# Patient Record
Sex: FEMALE | Race: WHITE | Employment: OTHER | URBAN - METROPOLITAN AREA
[De-identification: names, ages, dates, MRNs, and addresses within clinical notes are randomized per-mention and may not be internally consistent; named-entity substitution may affect disease eponyms.]

---

## 2017-05-23 ENCOUNTER — HOSPITAL ENCOUNTER (OUTPATIENT)
Age: 82
Discharge: HOME | End: 2017-05-23
Payer: MEDICARE

## 2017-05-23 LAB
ALT SERPL-CCNC: 9 U/L (ref 5–33)
AST SERPL-CCNC: 18 U/L
CHOLESTEROL/HDL RATIO: 3.1
CHOLESTEROL: 178 MG/DL
HDLC SERPL-MCNC: 57 MG/DL
LDL CHOLESTEROL: 101 MG/DL (ref 0–130)
TRIGL SERPL-MCNC: 101 MG/DL
VLDLC SERPL CALC-MCNC: NORMAL MG/DL (ref 1–30)

## 2017-05-23 PROCEDURE — 80061 LIPID PANEL: CPT

## 2017-05-23 PROCEDURE — 36415 COLL VENOUS BLD VENIPUNCTURE: CPT

## 2017-05-23 PROCEDURE — 84460 ALANINE AMINO (ALT) (SGPT): CPT

## 2017-05-23 PROCEDURE — 84450 TRANSFERASE (AST) (SGOT): CPT

## 2017-09-21 ENCOUNTER — HOSPITAL ENCOUNTER (OUTPATIENT)
Age: 82
Discharge: HOME | End: 2017-09-21
Payer: MEDICARE

## 2017-09-21 LAB
ABSOLUTE EOS #: 0.2 K/UL (ref 0–0.4)
ABSOLUTE LYMPH #: 1.3 K/UL (ref 1–4.8)
ABSOLUTE MONO #: 0.6 K/UL (ref 0.1–1.3)
ANION GAP SERPL CALCULATED.3IONS-SCNC: 11 MMOL/L (ref 9–17)
BASOPHILS # BLD: 1 %
BASOPHILS ABSOLUTE: 0.1 K/UL (ref 0–0.2)
BUN BLDV-MCNC: 20 MG/DL (ref 8–23)
CHLORIDE BLD-SCNC: 104 MMOL/L (ref 98–107)
CO2: 27 MMOL/L (ref 20–31)
CREAT SERPL-MCNC: 0.92 MG/DL (ref 0.5–0.9)
DIFFERENTIAL TYPE: ABNORMAL
EOSINOPHILS RELATIVE PERCENT: 5 %
GFR AFRICAN AMERICAN: >60 ML/MIN
GFR NON-AFRICAN AMERICAN: 58 ML/MIN
GFR SERPL CREATININE-BSD FRML MDRD: ABNORMAL ML/MIN/{1.73_M2}
GFR SERPL CREATININE-BSD FRML MDRD: ABNORMAL ML/MIN/{1.73_M2}
GLUCOSE BLD-MCNC: 90 MG/DL (ref 70–99)
HCT VFR BLD CALC: 37.4 % (ref 36–46)
HEMOGLOBIN: 12.4 G/DL (ref 12–16)
LYMPHOCYTES # BLD: 25 %
MCH RBC QN AUTO: 31.5 PG (ref 26–34)
MCHC RBC AUTO-ENTMCNC: 33.1 G/DL (ref 31–37)
MCV RBC AUTO: 95.1 FL (ref 80–100)
MONOCYTES # BLD: 11 %
PDW BLD-RTO: 13.5 % (ref 11.5–14.9)
PLATELET # BLD: 223 K/UL (ref 150–450)
PLATELET ESTIMATE: ABNORMAL
PMV BLD AUTO: 6.6 FL (ref 6–12)
POTASSIUM SERPL-SCNC: 4.8 MMOL/L (ref 3.7–5.3)
RBC # BLD: 3.93 M/UL (ref 4–5.2)
RBC # BLD: ABNORMAL 10*6/UL
SEG NEUTROPHILS: 58 %
SEGMENTED NEUTROPHILS ABSOLUTE COUNT: 3 K/UL (ref 1.3–9.1)
SODIUM BLD-SCNC: 142 MMOL/L (ref 135–144)
THYROXINE, FREE: 1.41 NG/DL (ref 0.93–1.7)
TSH SERPL DL<=0.05 MIU/L-ACNC: 1.32 MIU/L (ref 0.3–5)
WBC # BLD: 5.2 K/UL (ref 3.5–11)
WBC # BLD: ABNORMAL 10*3/UL

## 2017-09-21 PROCEDURE — 85025 COMPLETE CBC W/AUTO DIFF WBC: CPT

## 2017-09-21 PROCEDURE — 82947 ASSAY GLUCOSE BLOOD QUANT: CPT

## 2017-09-21 PROCEDURE — 36415 COLL VENOUS BLD VENIPUNCTURE: CPT

## 2017-09-21 PROCEDURE — 84443 ASSAY THYROID STIM HORMONE: CPT

## 2017-09-21 PROCEDURE — 82565 ASSAY OF CREATININE: CPT

## 2017-09-21 PROCEDURE — 84439 ASSAY OF FREE THYROXINE: CPT

## 2017-09-21 PROCEDURE — 80051 ELECTROLYTE PANEL: CPT

## 2017-09-21 PROCEDURE — 84520 ASSAY OF UREA NITROGEN: CPT

## 2019-04-22 ENCOUNTER — HOSPITAL ENCOUNTER (OUTPATIENT)
Dept: WOMENS IMAGING | Age: 84
Discharge: HOME | End: 2019-04-24
Payer: MEDICARE

## 2019-04-22 DIAGNOSIS — N95.1 MENOPAUSAL STATE: ICD-10-CM

## 2019-04-22 PROCEDURE — 77080 DXA BONE DENSITY AXIAL: CPT

## 2019-04-25 ENCOUNTER — HOSPITAL ENCOUNTER (OUTPATIENT)
Age: 84
Discharge: HOME | End: 2019-04-25
Payer: MEDICARE

## 2019-04-25 LAB
ABSOLUTE EOS #: 0.2 K/UL (ref 0–0.4)
ABSOLUTE IMMATURE GRANULOCYTE: ABNORMAL K/UL (ref 0–0.3)
ABSOLUTE LYMPH #: 1.3 K/UL (ref 1–4.8)
ABSOLUTE MONO #: 0.5 K/UL (ref 0.1–1.3)
ALT SERPL-CCNC: 12 U/L (ref 5–33)
ANION GAP SERPL CALCULATED.3IONS-SCNC: 11 MMOL/L (ref 9–17)
BASOPHILS # BLD: 1 % (ref 0–2)
BASOPHILS ABSOLUTE: 0 K/UL (ref 0–0.2)
BUN BLDV-MCNC: 17 MG/DL (ref 8–23)
BUN/CREAT BLD: NORMAL (ref 9–20)
CALCIUM IONIZED: 1.23 MMOL/L (ref 1.13–1.33)
CALCIUM SERPL-MCNC: 8.7 MG/DL (ref 8.6–10.4)
CHLORIDE BLD-SCNC: 103 MMOL/L (ref 98–107)
CHOLESTEROL/HDL RATIO: 3.6
CHOLESTEROL: 164 MG/DL
CO2: 25 MMOL/L (ref 20–31)
CREAT SERPL-MCNC: 0.88 MG/DL (ref 0.5–0.9)
DIFFERENTIAL TYPE: ABNORMAL
EOSINOPHILS RELATIVE PERCENT: 5 % (ref 0–4)
GFR AFRICAN AMERICAN: >60 ML/MIN
GFR NON-AFRICAN AMERICAN: >60 ML/MIN
GFR SERPL CREATININE-BSD FRML MDRD: NORMAL ML/MIN/{1.73_M2}
GFR SERPL CREATININE-BSD FRML MDRD: NORMAL ML/MIN/{1.73_M2}
GLUCOSE BLD-MCNC: 88 MG/DL (ref 70–99)
HCT VFR BLD CALC: 39.4 % (ref 36–46)
HDLC SERPL-MCNC: 46 MG/DL
HEMOGLOBIN: 13 G/DL (ref 12–16)
IMMATURE GRANULOCYTES: ABNORMAL %
IRON: 79 UG/DL (ref 37–145)
LDL CHOLESTEROL: 100 MG/DL (ref 0–130)
LYMPHOCYTES # BLD: 27 % (ref 24–44)
MCH RBC QN AUTO: 31.6 PG (ref 26–34)
MCHC RBC AUTO-ENTMCNC: 33.1 G/DL (ref 31–37)
MCV RBC AUTO: 95.3 FL (ref 80–100)
MONOCYTES # BLD: 10 % (ref 1–7)
NRBC AUTOMATED: ABNORMAL PER 100 WBC
PDW BLD-RTO: 13.7 % (ref 11.5–14.9)
PLATELET # BLD: 241 K/UL (ref 150–450)
PLATELET ESTIMATE: ABNORMAL
PMV BLD AUTO: 6.6 FL (ref 6–12)
POTASSIUM SERPL-SCNC: 4.7 MMOL/L (ref 3.7–5.3)
RBC # BLD: 4.13 M/UL (ref 4–5.2)
RBC # BLD: ABNORMAL 10*6/UL
SEG NEUTROPHILS: 57 % (ref 36–66)
SEGMENTED NEUTROPHILS ABSOLUTE COUNT: 2.6 K/UL (ref 1.3–9.1)
SODIUM BLD-SCNC: 139 MMOL/L (ref 135–144)
TRIGL SERPL-MCNC: 91 MG/DL
TSH SERPL DL<=0.05 MIU/L-ACNC: 2.17 MIU/L (ref 0.3–5)
VITAMIN B-12: 1957 PG/ML (ref 232–1245)
VLDLC SERPL CALC-MCNC: NORMAL MG/DL (ref 1–30)
WBC # BLD: 4.7 K/UL (ref 3.5–11)
WBC # BLD: ABNORMAL 10*3/UL

## 2019-04-25 PROCEDURE — 80048 BASIC METABOLIC PNL TOTAL CA: CPT

## 2019-04-25 PROCEDURE — 36415 COLL VENOUS BLD VENIPUNCTURE: CPT

## 2019-04-25 PROCEDURE — 84460 ALANINE AMINO (ALT) (SGPT): CPT

## 2019-04-25 PROCEDURE — 80061 LIPID PANEL: CPT

## 2019-04-25 PROCEDURE — 83540 ASSAY OF IRON: CPT

## 2019-04-25 PROCEDURE — 82330 ASSAY OF CALCIUM: CPT

## 2019-04-25 PROCEDURE — 82607 VITAMIN B-12: CPT

## 2019-04-25 PROCEDURE — 85025 COMPLETE CBC W/AUTO DIFF WBC: CPT

## 2019-04-25 PROCEDURE — 84443 ASSAY THYROID STIM HORMONE: CPT

## 2020-04-27 ENCOUNTER — HOSPITAL ENCOUNTER (INPATIENT)
Age: 85
LOS: 1 days | Discharge: ANOTHER HOSPITAL | DRG: 690 | End: 2020-04-28
Attending: EMERGENCY MEDICINE | Admitting: FAMILY MEDICINE
Payer: MEDICARE

## 2020-04-27 ENCOUNTER — TELEPHONE (OUTPATIENT)
Dept: UROLOGY | Age: 85
End: 2020-04-27

## 2020-04-27 VITALS
WEIGHT: 176 LBS | OXYGEN SATURATION: 99 % | HEIGHT: 59 IN | SYSTOLIC BLOOD PRESSURE: 112 MMHG | HEART RATE: 88 BPM | DIASTOLIC BLOOD PRESSURE: 63 MMHG | TEMPERATURE: 98.1 F | BODY MASS INDEX: 35.48 KG/M2 | RESPIRATION RATE: 20 BRPM

## 2020-04-27 PROBLEM — R31.9 HEMATURIA: Status: ACTIVE | Noted: 2020-04-27

## 2020-04-27 PROBLEM — E03.8 OTHER SPECIFIED HYPOTHYROIDISM: Status: ACTIVE | Noted: 2020-04-27

## 2020-04-27 PROBLEM — E53.8 VITAMIN B12 DEFICIENCY: Status: ACTIVE | Noted: 2020-04-27

## 2020-04-27 PROBLEM — K21.9 GASTROESOPHAGEAL REFLUX DISEASE WITHOUT ESOPHAGITIS: Status: ACTIVE | Noted: 2020-04-27

## 2020-04-27 LAB
-: ABNORMAL
ABSOLUTE EOS #: 0.2 K/UL (ref 0–0.4)
ABSOLUTE IMMATURE GRANULOCYTE: ABNORMAL K/UL (ref 0–0.3)
ABSOLUTE LYMPH #: 1.1 K/UL (ref 1–4.8)
ABSOLUTE MONO #: 0.7 K/UL (ref 0.1–1.3)
ALBUMIN SERPL-MCNC: 4 G/DL (ref 3.5–5.2)
ALBUMIN/GLOBULIN RATIO: ABNORMAL (ref 1–2.5)
ALP BLD-CCNC: 82 U/L (ref 35–104)
ALT SERPL-CCNC: 14 U/L (ref 5–33)
AMORPHOUS: ABNORMAL
ANION GAP SERPL CALCULATED.3IONS-SCNC: 14 MMOL/L (ref 9–17)
AST SERPL-CCNC: 22 U/L
BACTERIA: ABNORMAL
BASOPHILS # BLD: 1 % (ref 0–2)
BASOPHILS ABSOLUTE: 0.1 K/UL (ref 0–0.2)
BILIRUB SERPL-MCNC: 0.22 MG/DL (ref 0.3–1.2)
BILIRUBIN URINE: NEGATIVE
BUN BLDV-MCNC: 17 MG/DL (ref 8–23)
BUN/CREAT BLD: ABNORMAL (ref 9–20)
CALCIUM SERPL-MCNC: 9.5 MG/DL (ref 8.6–10.4)
CASTS UA: ABNORMAL /LPF
CHLORIDE BLD-SCNC: 102 MMOL/L (ref 98–107)
CO2: 24 MMOL/L (ref 20–31)
COLOR: YELLOW
COMMENT UA: ABNORMAL
CREAT SERPL-MCNC: 1.03 MG/DL (ref 0.5–0.9)
CRYSTALS, UA: ABNORMAL /HPF
DIFFERENTIAL TYPE: ABNORMAL
EOSINOPHILS RELATIVE PERCENT: 2 % (ref 0–4)
EPITHELIAL CELLS UA: ABNORMAL /HPF
GFR AFRICAN AMERICAN: >60 ML/MIN
GFR NON-AFRICAN AMERICAN: 51 ML/MIN
GFR SERPL CREATININE-BSD FRML MDRD: ABNORMAL ML/MIN/{1.73_M2}
GFR SERPL CREATININE-BSD FRML MDRD: ABNORMAL ML/MIN/{1.73_M2}
GLUCOSE BLD-MCNC: 112 MG/DL (ref 70–99)
GLUCOSE URINE: NEGATIVE
HCT VFR BLD CALC: 39.9 % (ref 36–46)
HEMOGLOBIN: 12.9 G/DL (ref 12–16)
IMMATURE GRANULOCYTES: ABNORMAL %
INR BLD: 1
KETONES, URINE: NEGATIVE
LEUKOCYTE ESTERASE, URINE: ABNORMAL
LYMPHOCYTES # BLD: 15 % (ref 24–44)
MCH RBC QN AUTO: 31 PG (ref 26–34)
MCHC RBC AUTO-ENTMCNC: 32.3 G/DL (ref 31–37)
MCV RBC AUTO: 95.8 FL (ref 80–100)
MONOCYTES # BLD: 9 % (ref 1–7)
MUCUS: ABNORMAL
NITRITE, URINE: NEGATIVE
NRBC AUTOMATED: ABNORMAL PER 100 WBC
OTHER OBSERVATIONS UA: ABNORMAL
PDW BLD-RTO: 13.6 % (ref 11.5–14.9)
PH UA: 7.5 (ref 5–8)
PLATELET # BLD: 267 K/UL (ref 150–450)
PLATELET ESTIMATE: ABNORMAL
PMV BLD AUTO: 6.7 FL (ref 6–12)
POTASSIUM SERPL-SCNC: 4.7 MMOL/L (ref 3.7–5.3)
PROTEIN UA: ABNORMAL
PROTHROMBIN TIME: 13 SEC (ref 11.8–14.6)
RBC # BLD: 4.17 M/UL (ref 4–5.2)
RBC # BLD: ABNORMAL 10*6/UL
RBC UA: ABNORMAL /HPF
RENAL EPITHELIAL, UA: ABNORMAL /HPF
SEG NEUTROPHILS: 73 % (ref 36–66)
SEGMENTED NEUTROPHILS ABSOLUTE COUNT: 5.8 K/UL (ref 1.3–9.1)
SODIUM BLD-SCNC: 140 MMOL/L (ref 135–144)
SPECIFIC GRAVITY UA: 1.02 (ref 1–1.03)
TOTAL PROTEIN: 7.3 G/DL (ref 6.4–8.3)
TRICHOMONAS: ABNORMAL
TURBIDITY: CLEAR
URINE HGB: ABNORMAL
UROBILINOGEN, URINE: NORMAL
WBC # BLD: 7.8 K/UL (ref 3.5–11)
WBC # BLD: ABNORMAL 10*3/UL
WBC UA: ABNORMAL /HPF
YEAST: ABNORMAL

## 2020-04-27 PROCEDURE — 2580000003 HC RX 258: Performed by: FAMILY MEDICINE

## 2020-04-27 PROCEDURE — 99284 EMERGENCY DEPT VISIT MOD MDM: CPT

## 2020-04-27 PROCEDURE — 81001 URINALYSIS AUTO W/SCOPE: CPT

## 2020-04-27 PROCEDURE — 3E1K88Z IRRIGATION OF GENITOURINARY TRACT USING IRRIGATING SUBSTANCE, VIA NATURAL OR ARTIFICIAL OPENING ENDOSCOPIC: ICD-10-PCS | Performed by: FAMILY MEDICINE

## 2020-04-27 PROCEDURE — 51700 IRRIGATION OF BLADDER: CPT

## 2020-04-27 PROCEDURE — 1200000000 HC SEMI PRIVATE

## 2020-04-27 PROCEDURE — 85025 COMPLETE CBC W/AUTO DIFF WBC: CPT

## 2020-04-27 PROCEDURE — 2580000003 HC RX 258: Performed by: EMERGENCY MEDICINE

## 2020-04-27 PROCEDURE — 6370000000 HC RX 637 (ALT 250 FOR IP): Performed by: FAMILY MEDICINE

## 2020-04-27 PROCEDURE — 87088 URINE BACTERIA CULTURE: CPT

## 2020-04-27 PROCEDURE — 87086 URINE CULTURE/COLONY COUNT: CPT

## 2020-04-27 PROCEDURE — 51702 INSERT TEMP BLADDER CATH: CPT

## 2020-04-27 PROCEDURE — 85610 PROTHROMBIN TIME: CPT

## 2020-04-27 PROCEDURE — 6360000002 HC RX W HCPCS: Performed by: EMERGENCY MEDICINE

## 2020-04-27 PROCEDURE — 36415 COLL VENOUS BLD VENIPUNCTURE: CPT

## 2020-04-27 PROCEDURE — 80053 COMPREHEN METABOLIC PANEL: CPT

## 2020-04-27 PROCEDURE — 87186 SC STD MICRODIL/AGAR DIL: CPT

## 2020-04-27 RX ORDER — SODIUM CHLORIDE 0.9 % (FLUSH) 0.9 %
10 SYRINGE (ML) INJECTION EVERY 12 HOURS SCHEDULED
Status: DISCONTINUED | OUTPATIENT
Start: 2020-04-27 | End: 2020-04-28 | Stop reason: HOSPADM

## 2020-04-27 RX ORDER — ALBUTEROL SULFATE 90 UG/1
1 AEROSOL, METERED RESPIRATORY (INHALATION) PRN
COMMUNITY
Start: 2019-12-11 | End: 2021-01-19

## 2020-04-27 RX ORDER — LISINOPRIL 10 MG/1
10 TABLET ORAL DAILY
Status: DISCONTINUED | OUTPATIENT
Start: 2020-04-27 | End: 2020-04-28 | Stop reason: HOSPADM

## 2020-04-27 RX ORDER — MAGNESIUM SULFATE 1 G/100ML
1 INJECTION INTRAVENOUS PRN
Status: DISCONTINUED | OUTPATIENT
Start: 2020-04-27 | End: 2020-04-28 | Stop reason: HOSPADM

## 2020-04-27 RX ORDER — ACETAMINOPHEN 325 MG/1
650 TABLET ORAL EVERY 6 HOURS PRN
Status: DISCONTINUED | OUTPATIENT
Start: 2020-04-27 | End: 2020-04-28 | Stop reason: HOSPADM

## 2020-04-27 RX ORDER — PHENAZOPYRIDINE HYDROCHLORIDE 100 MG/1
100 TABLET, FILM COATED ORAL ONCE
Status: COMPLETED | OUTPATIENT
Start: 2020-04-27 | End: 2020-04-27

## 2020-04-27 RX ORDER — ACETAMINOPHEN 325 MG/1
650 TABLET ORAL EVERY 4 HOURS PRN
Status: DISCONTINUED | OUTPATIENT
Start: 2020-04-27 | End: 2020-04-28 | Stop reason: HOSPADM

## 2020-04-27 RX ORDER — LATANOPROST 50 UG/ML
1 SOLUTION/ DROPS OPHTHALMIC NIGHTLY
Status: ON HOLD | COMMUNITY
Start: 2019-05-31 | End: 2020-04-27

## 2020-04-27 RX ORDER — SODIUM CHLORIDE 9 MG/ML
INJECTION, SOLUTION INTRAVENOUS CONTINUOUS
Status: DISCONTINUED | OUTPATIENT
Start: 2020-04-27 | End: 2020-04-28 | Stop reason: HOSPADM

## 2020-04-27 RX ORDER — SODIUM CHLORIDE 0.9 % (FLUSH) 0.9 %
10 SYRINGE (ML) INJECTION PRN
Status: DISCONTINUED | OUTPATIENT
Start: 2020-04-27 | End: 2020-04-28 | Stop reason: HOSPADM

## 2020-04-27 RX ORDER — LEVOTHYROXINE SODIUM 0.07 MG/1
75 TABLET ORAL DAILY
Status: DISCONTINUED | OUTPATIENT
Start: 2020-04-27 | End: 2020-04-28 | Stop reason: HOSPADM

## 2020-04-27 RX ORDER — PRAVASTATIN SODIUM 40 MG
80 TABLET ORAL NIGHTLY
Status: DISCONTINUED | OUTPATIENT
Start: 2020-04-28 | End: 2020-04-27

## 2020-04-27 RX ORDER — POTASSIUM CHLORIDE 20 MEQ/1
40 TABLET, EXTENDED RELEASE ORAL PRN
Status: DISCONTINUED | OUTPATIENT
Start: 2020-04-27 | End: 2020-04-28 | Stop reason: HOSPADM

## 2020-04-27 RX ORDER — PROMETHAZINE HYDROCHLORIDE 25 MG/1
12.5 TABLET ORAL EVERY 6 HOURS PRN
Status: DISCONTINUED | OUTPATIENT
Start: 2020-04-27 | End: 2020-04-28 | Stop reason: HOSPADM

## 2020-04-27 RX ORDER — ONDANSETRON 2 MG/ML
4 INJECTION INTRAMUSCULAR; INTRAVENOUS EVERY 6 HOURS PRN
Status: DISCONTINUED | OUTPATIENT
Start: 2020-04-27 | End: 2020-04-28 | Stop reason: HOSPADM

## 2020-04-27 RX ORDER — PRAVASTATIN SODIUM 40 MG
40 TABLET ORAL NIGHTLY
Status: DISCONTINUED | OUTPATIENT
Start: 2020-04-27 | End: 2020-04-27

## 2020-04-27 RX ORDER — POTASSIUM CHLORIDE 7.45 MG/ML
10 INJECTION INTRAVENOUS PRN
Status: DISCONTINUED | OUTPATIENT
Start: 2020-04-27 | End: 2020-04-28 | Stop reason: HOSPADM

## 2020-04-27 RX ORDER — LATANOPROST 50 UG/ML
1 SOLUTION/ DROPS OPHTHALMIC NIGHTLY
COMMUNITY

## 2020-04-27 RX ORDER — LISINOPRIL 5 MG/1
2.5 TABLET ORAL DAILY
Status: ON HOLD | COMMUNITY
End: 2021-04-02 | Stop reason: HOSPADM

## 2020-04-27 RX ORDER — LATANOPROST 50 UG/ML
1 SOLUTION/ DROPS OPHTHALMIC NIGHTLY
Status: DISCONTINUED | OUTPATIENT
Start: 2020-04-27 | End: 2020-04-28 | Stop reason: HOSPADM

## 2020-04-27 RX ORDER — PRAVASTATIN SODIUM 40 MG
80 TABLET ORAL NIGHTLY
Status: DISCONTINUED | OUTPATIENT
Start: 2020-04-27 | End: 2020-04-28 | Stop reason: HOSPADM

## 2020-04-27 RX ORDER — AMOXICILLIN 250 MG
1 CAPSULE ORAL DAILY PRN
COMMUNITY

## 2020-04-27 RX ORDER — LEVOTHYROXINE SODIUM 0.07 MG/1
75 TABLET ORAL DAILY
COMMUNITY

## 2020-04-27 RX ORDER — AZELASTINE HYDROCHLORIDE AND FLUTICASONE PROPIONATE 137; 50 UG/1; UG/1
1 SPRAY, METERED NASAL
COMMUNITY
End: 2021-02-18

## 2020-04-27 RX ORDER — ACETAMINOPHEN 650 MG/1
650 SUPPOSITORY RECTAL EVERY 6 HOURS PRN
Status: DISCONTINUED | OUTPATIENT
Start: 2020-04-27 | End: 2020-04-28 | Stop reason: HOSPADM

## 2020-04-27 RX ADMIN — LATANOPROST 1 DROP: 50 SOLUTION OPHTHALMIC at 21:32

## 2020-04-27 RX ADMIN — CEFTRIAXONE SODIUM 1 G: 1 INJECTION, POWDER, FOR SOLUTION INTRAMUSCULAR; INTRAVENOUS at 12:29

## 2020-04-27 RX ADMIN — HYDROMORPHONE HYDROCHLORIDE 0.5 MG: 1 INJECTION, SOLUTION INTRAMUSCULAR; INTRAVENOUS; SUBCUTANEOUS at 13:02

## 2020-04-27 RX ADMIN — PHENAZOPYRIDINE HYDROCHLORIDE 100 MG: 100 TABLET ORAL at 15:56

## 2020-04-27 RX ADMIN — PRAVASTATIN SODIUM 80 MG: 40 TABLET ORAL at 22:19

## 2020-04-27 RX ADMIN — MAGNESIUM HYDROXIDE 30 ML: 400 SUSPENSION ORAL at 22:19

## 2020-04-27 RX ADMIN — ACETAMINOPHEN 650 MG: 325 TABLET, FILM COATED ORAL at 15:56

## 2020-04-27 RX ADMIN — SODIUM CHLORIDE: 9 INJECTION, SOLUTION INTRAVENOUS at 14:44

## 2020-04-27 ASSESSMENT — ENCOUNTER SYMPTOMS
VOMITING: 0
BLOOD IN STOOL: 0
TROUBLE SWALLOWING: 0
BACK PAIN: 0
SHORTNESS OF BREATH: 0
CHEST TIGHTNESS: 0
EYE ITCHING: 0
NAUSEA: 0
ABDOMINAL PAIN: 0
COUGH: 0
COLOR CHANGE: 0
EYE REDNESS: 0

## 2020-04-27 ASSESSMENT — PAIN SCALES - GENERAL
PAINLEVEL_OUTOF10: 2
PAINLEVEL_OUTOF10: 8
PAINLEVEL_OUTOF10: 2
PAINLEVEL_OUTOF10: 10

## 2020-04-27 ASSESSMENT — PAIN DESCRIPTION - PAIN TYPE: TYPE: ACUTE PAIN

## 2020-04-27 ASSESSMENT — PAIN DESCRIPTION - DESCRIPTORS
DESCRIPTORS: BURNING
DESCRIPTORS: BURNING

## 2020-04-27 ASSESSMENT — PAIN DESCRIPTION - LOCATION: LOCATION: GROIN;PERINEUM

## 2020-04-27 NOTE — ED NOTES
Report given to Olvin Bailey RN from [employer]. Report method by phone   The following was reviewed with receiving RN:   Current vital signs:  BP (!) 143/73   Pulse 95   Temp 97.5 °F (36.4 °C) (Oral)   Resp 18   Ht 4' 11\" (1.499 m)   Wt 176 lb (79.8 kg)   SpO2 95%   BMI 35.55 kg/m²                MEWS Score: 1     RN told that pt is having blood and clots in urine since yesterday. RN told that pt has a 3 way jung with continuous bladder irrigation going. RN told that pt is having pain and was given meds. RN told that pt was given antibiotics. Any medication or safety alerts were reviewed. Any pending diagnostics and notifications were also reviewed, as well as any safety concerns or issues, abnormal labs, abnormal imaging, and abnormal assessment findings. Questions were answered.             Vin Barrios RN  04/27/20 6660

## 2020-04-27 NOTE — PROGRESS NOTES
Physical Therapy  DATE: 2020    NAME: Krunal Dodson  MRN: [id number]   : 1935    Patient not seen this date for Physical Therapy due to:  [] Blood transfusion in progress  [] Hemodialysis  []  Patient Declined  [] Spine Precautions   [] Strict Bedrest  [] Surgery/ Procedure  [] Testing      [x] Other 2020 at 5522- HOLD PT evaluation today per nurse Britton Felton.  as pt has 3 way catheter for continuous bladder irrigation. Will check status tomorrow. [] PT being discontinued at this time. Patient independent. No further needs. [] PT being discontinued at this time as the patient has been transferred to palliative care. No further needs.     Debra Noble, PT

## 2020-04-27 NOTE — PROGRESS NOTES
Medication History completed:    New medications: latanoprost ophthalmic solution    Medications discontinued: famotidine, ferrous sulfate, metronidazole, vesicare, vitamin B12    Changes to dosing:   Lisinopril changed to 10 mg daily    Stated allergies: As listed    Other pertinent information: Medications confirmed with [facility] DR AMBERLY LAIRD.      Thank you,  Julia Rodriguez, PharmD, BCPS  [phone]

## 2020-04-27 NOTE — ED NOTES
Hope Catheter manually irrigated with nancy syringe and Saline. Pt tolerated well. Large amount of clots removed. Catheter with cherry colored urine.      Joellen Akhtar RN  04/27/20 6590

## 2020-04-27 NOTE — H&P
DO   levothyroxine (SYNTHROID) 75 MCG tablet Take 1 tablet by mouth Daily 7/31/15  Yes Jere DORON Soto, DO        Allergies:       Bactrim [sulfamethoxazole-trimethoprim]    Social History:     Tobacco:    reports that she has never smoked. She does not have any smokeless tobacco history on file. Alcohol:      reports no history of alcohol use. Drug Use:  reports no history of drug use. Family History:     Family History   Problem Relation Age of Onset    Diabetes Brother     Stroke Father     Coronary Art Dis Unknown         siblings X3       Review of Systems:     Positive and Negative as described in HPI   all 10 systems are reviewed and negative except as Noted      Review of Systems   Constitutional: Negative for chills and fatigue. HENT: Negative for drooling, mouth sores, sneezing and trouble swallowing. Eyes: Negative for redness and itching. Respiratory: Negative for cough, chest tightness and shortness of breath. Cardiovascular: Negative for chest pain, palpitations and leg swelling. Gastrointestinal: Negative for abdominal pain, blood in stool, nausea and vomiting. Endocrine: Negative for heat intolerance and polyphagia. Genitourinary: Positive for dysuria and hematuria. Negative for difficulty urinating, flank pain, pelvic pain and urgency. Musculoskeletal: Negative for arthralgias, joint swelling and neck stiffness. Skin: Negative for color change and pallor. Allergic/Immunologic: Negative for food allergies. Neurological: Negative for dizziness, seizures and headaches. Hematological: Does not bruise/bleed easily. Psychiatric/Behavioral: Negative for agitation, behavioral problems and suicidal ideas. The patient is not hyperactive.         Code Status:  Full Code    Physical Exam:     Vitals:  BP (!) 143/73   Pulse 95   Temp 97.5 °F (36.4 °C) (Oral)   Resp 18   Ht 4' 11\" (1.499 m)   Wt 176 lb (79.8 kg)   SpO2 95%   BMI 35.55 kg/m²   Temp (24hrs), Av.5 °F (36.4 Immature Granulocytes NOT REPORTED 0 %    Segs Absolute 5.80 1.3 - 9.1 k/uL    Absolute Lymph # 1.10 1.0 - 4.8 k/uL    Absolute Mono # 0.70 0.1 - 1.3 k/uL    Absolute Eos # 0.20 0.0 - 0.4 k/uL    Basophils Absolute 0.10 0.0 - 0.2 k/uL    Absolute Immature Granulocyte NOT REPORTED 0.00 - 0.30 k/uL    WBC Morphology NOT REPORTED     RBC Morphology NOT REPORTED     Platelet Estimate NOT REPORTED    Protime-INR    Collection Time: 04/27/20  9:46 AM   Result Value Ref Range    Protime 13.0 11.8 - 14.6 sec    INR 1.0    Comprehensive Metabolic Panel    Collection Time: 04/27/20  9:46 AM   Result Value Ref Range    Glucose 112 (H) 70 - 99 mg/dL    BUN 17 8 - 23 mg/dL    CREATININE 1.03 (H) 0.50 - 0.90 mg/dL    Bun/Cre Ratio NOT REPORTED 9 - 20    Calcium 9.5 8.6 - 10.4 mg/dL    Sodium 140 135 - 144 mmol/L    Potassium 4.7 3.7 - 5.3 mmol/L    Chloride 102 98 - 107 mmol/L    CO2 24 20 - 31 mmol/L    Anion Gap 14 9 - 17 mmol/L    Alkaline Phosphatase 82 35 - 104 U/L    ALT 14 5 - 33 U/L    AST 22 <32 U/L    Total Bilirubin 0.22 (L) 0.3 - 1.2 mg/dL    Total Protein 7.3 6.4 - 8.3 g/dL    Alb 4.0 3.5 - 5.2 g/dL    Albumin/Globulin Ratio NOT REPORTED 1.0 - 2.5    GFR Non- 51 (L) >60 mL/min    GFR African American >60 >60 mL/min    GFR Comment          GFR Staging NOT REPORTED    Urinalysis Reflex to Culture    Collection Time: 04/27/20  9:50 AM   Result Value Ref Range    Color, UA YELLOW YELLOW    Turbidity UA CLEAR CLEAR    Glucose, Ur NEGATIVE NEGATIVE    Bilirubin Urine NEGATIVE NEGATIVE    Ketones, Urine NEGATIVE NEGATIVE    Specific Gravity, UA 1.019 1.000 - 1.030    Urine Hgb LARGE (A) NEGATIVE    pH, UA 7.5 5.0 - 8.0    Protein, UA 4+ (A) NEGATIVE    Urobilinogen, Urine Normal Normal    Nitrite, Urine NEGATIVE NEGATIVE    Leukocyte Esterase, Urine SMALL (A) NEGATIVE    Urinalysis Comments NOT REPORTED    Microscopic Urinalysis    Collection Time: 04/27/20  9:50 AM   Result Value Ref Range    -

## 2020-04-27 NOTE — ED PROVIDER NOTES
tablet 650 mg    HYDROmorphone (DILAUDID) injection 0.5 mg    lisinopril (PRINIVIL;ZESTRIL) tablet 10 mg    levothyroxine (SYNTHROID) tablet 75 mcg    latanoprost (XALATAN) 0.005 % ophthalmic solution 1 drop    pravastatin (PRAVACHOL) tablet 40 mg    sodium chloride flush 0.9 % injection 10 mL    sodium chloride flush 0.9 % injection 10 mL    OR Linked Order Group     potassium chloride (KLOR-CON M) extended release tablet 40 mEq     potassium bicarb-citric acid (EFFER-K) effervescent tablet 40 mEq     potassium chloride 10 mEq/100 mL IVPB (Peripheral Line)    magnesium sulfate 1 g in dextrose 5% 100 mL IVPB    OR Linked Order Group     acetaminophen (TYLENOL) tablet 650 mg     acetaminophen (TYLENOL) suppository 650 mg    magnesium hydroxide (MILK OF MAGNESIA) 400 MG/5ML suspension 30 mL    OR Linked Order Group     promethazine (PHENERGAN) tablet 12.5 mg     ondansetron (ZOFRAN) injection 4 mg    0.9 % sodium chloride infusion    cefTRIAXone (ROCEPHIN) 1 g IVPB in 50 mL D5W minibag    phenazopyridine (PYRIDIUM) tablet 100 mg     -------------------------  CRITICAL CARE:   CONSULTS: IP CONSULT TO UROLOGY  IP CONSULT TO PRIMARY CARE PROVIDER  PROCEDURES: Procedures     FINAL IMPRESSION      1. Gross hematuria    2.  Acute cystitis with hematuria          DISPOSITION/PLAN   DISPOSITION Admitted 04/27/2020 12:15:31 PM      PATIENT REFERRED TO:  [address] [address]            DISCHARGE MEDICATIONS:  Current Discharge Medication List            Katharina Urbina MD  Attending Emergency Physician                      Katharina Urbina MD  04/27/20 7463

## 2020-04-27 NOTE — ED NOTES
Mode of arrival (squad #, walk in, police, etc) : walk in        Chief complaint(s): dysuria/hematuria        Arrival Note (brief scenario, treatment PTA, etc). : Pt reports Hx of UTI. Pt states onset of symptoms yesterday. Pt reports burning sensation with urination. Pt states she saw \"blood in bowl\" yesterday. Pt AOx4. Rreasy, unlabored. Pt denies fever,any further symptoms        C= \"Have you ever felt that you should Cut down on your drinking? \"  No  A= \"Have people Annoyed you by criticizing your drinking? \"  No  G= \"Have you ever felt bad or Guilty about your drinking? \"  No  E= \"Have you ever had a drink as an Eye-opener first thing in the morning to steady your nerves or to help a hangover? \"  No      Deferred []      Reason for deferring: N/A    *If yes to two or more: probable alcohol abuse. Rodrigo Rodriguez RN  04/27/20 8442

## 2020-04-28 ENCOUNTER — HOSPITAL ENCOUNTER (INPATIENT)
Age: 85
LOS: 8 days | Discharge: NURSING, REHAB OR LONG-TERM CARE | DRG: 871 | End: 2020-05-06
Attending: UROLOGY | Admitting: INTERNAL MEDICINE
Payer: MEDICARE

## 2020-04-28 PROBLEM — N30.01 ACUTE CYSTITIS WITH HEMATURIA: Status: ACTIVE | Noted: 2020-04-28

## 2020-04-28 LAB
ABSOLUTE EOS #: 0 K/UL (ref 0–0.4)
ABSOLUTE IMMATURE GRANULOCYTE: 0 K/UL (ref 0–0.3)
ABSOLUTE LYMPH #: 0.63 K/UL (ref 1–4.8)
ABSOLUTE MONO #: 0.88 K/UL (ref 0.1–0.8)
ANION GAP SERPL CALCULATED.3IONS-SCNC: 15 MMOL/L (ref 9–17)
BASOPHILS # BLD: 0 % (ref 0–2)
BASOPHILS ABSOLUTE: 0 K/UL (ref 0–0.2)
BUN BLDV-MCNC: 21 MG/DL (ref 8–23)
BUN/CREAT BLD: ABNORMAL (ref 9–20)
CALCIUM SERPL-MCNC: 7.8 MG/DL (ref 8.6–10.4)
CHLORIDE BLD-SCNC: 104 MMOL/L (ref 98–107)
CO2: 17 MMOL/L (ref 20–31)
CREAT SERPL-MCNC: 1.6 MG/DL (ref 0.5–0.9)
DIFFERENTIAL TYPE: ABNORMAL
EOSINOPHILS RELATIVE PERCENT: 0 % (ref 1–4)
GFR AFRICAN AMERICAN: 37 ML/MIN
GFR NON-AFRICAN AMERICAN: 31 ML/MIN
GFR SERPL CREATININE-BSD FRML MDRD: ABNORMAL ML/MIN/{1.73_M2}
GFR SERPL CREATININE-BSD FRML MDRD: ABNORMAL ML/MIN/{1.73_M2}
GLUCOSE BLD-MCNC: 154 MG/DL (ref 70–99)
HCT VFR BLD CALC: 32.1 % (ref 36.3–47.1)
HCT VFR BLD CALC: 34.2 % (ref 36.3–47.1)
HEMOGLOBIN: 10 G/DL (ref 11.9–15.1)
HEMOGLOBIN: 10.1 G/DL (ref 11.9–15.1)
IMMATURE GRANULOCYTES: 0 %
LYMPHOCYTES # BLD: 5 % (ref 24–44)
MCH RBC QN AUTO: 31.1 PG (ref 25.2–33.5)
MCHC RBC AUTO-ENTMCNC: 31.2 G/DL (ref 28.4–34.8)
MCV RBC AUTO: 99.7 FL (ref 82.6–102.9)
MONOCYTES # BLD: 7 % (ref 1–7)
MORPHOLOGY: NORMAL
NRBC AUTOMATED: 0 PER 100 WBC
PDW BLD-RTO: 13.5 % (ref 11.8–14.4)
PLATELET # BLD: 227 K/UL (ref 138–453)
PLATELET ESTIMATE: ABNORMAL
PMV BLD AUTO: 8.8 FL (ref 8.1–13.5)
POTASSIUM SERPL-SCNC: 4.5 MMOL/L (ref 3.7–5.3)
RBC # BLD: 3.22 M/UL (ref 3.95–5.11)
RBC # BLD: ABNORMAL 10*6/UL
SEG NEUTROPHILS: 88 % (ref 36–66)
SEGMENTED NEUTROPHILS ABSOLUTE COUNT: 11.09 K/UL (ref 1.8–7.7)
SODIUM BLD-SCNC: 136 MMOL/L (ref 135–144)
WBC # BLD: 12.6 K/UL (ref 3.5–11.3)
WBC # BLD: ABNORMAL 10*3/UL

## 2020-04-28 PROCEDURE — 85014 HEMATOCRIT: CPT

## 2020-04-28 PROCEDURE — 6370000000 HC RX 637 (ALT 250 FOR IP): Performed by: STUDENT IN AN ORGANIZED HEALTH CARE EDUCATION/TRAINING PROGRAM

## 2020-04-28 PROCEDURE — 6370000000 HC RX 637 (ALT 250 FOR IP): Performed by: FAMILY MEDICINE

## 2020-04-28 PROCEDURE — 85025 COMPLETE CBC W/AUTO DIFF WBC: CPT

## 2020-04-28 PROCEDURE — 51700 IRRIGATION OF BLADDER: CPT

## 2020-04-28 PROCEDURE — 36415 COLL VENOUS BLD VENIPUNCTURE: CPT

## 2020-04-28 PROCEDURE — 80048 BASIC METABOLIC PNL TOTAL CA: CPT

## 2020-04-28 PROCEDURE — 6360000002 HC RX W HCPCS: Performed by: STUDENT IN AN ORGANIZED HEALTH CARE EDUCATION/TRAINING PROGRAM

## 2020-04-28 PROCEDURE — 51702 INSERT TEMP BLADDER CATH: CPT

## 2020-04-28 PROCEDURE — 3E1K78Z IRRIGATION OF GENITOURINARY TRACT USING IRRIGATING SUBSTANCE, VIA NATURAL OR ARTIFICIAL OPENING: ICD-10-PCS | Performed by: UROLOGY

## 2020-04-28 PROCEDURE — 2580000003 HC RX 258: Performed by: PHYSICIAN ASSISTANT

## 2020-04-28 PROCEDURE — 1200000000 HC SEMI PRIVATE

## 2020-04-28 PROCEDURE — 85018 HEMOGLOBIN: CPT

## 2020-04-28 PROCEDURE — 2580000003 HC RX 258: Performed by: STUDENT IN AN ORGANIZED HEALTH CARE EDUCATION/TRAINING PROGRAM

## 2020-04-28 RX ORDER — SODIUM CHLORIDE 9 MG/ML
INJECTION, SOLUTION INTRAVENOUS CONTINUOUS
Status: DISCONTINUED | OUTPATIENT
Start: 2020-04-28 | End: 2020-04-28

## 2020-04-28 RX ORDER — ATROPA BELLADONNA AND OPIUM 16.2; 6 MG/1; MG/1
60 SUPPOSITORY RECTAL ONCE
Status: COMPLETED | OUTPATIENT
Start: 2020-04-28 | End: 2020-04-28

## 2020-04-28 RX ORDER — LEVOTHYROXINE SODIUM 0.07 MG/1
75 TABLET ORAL DAILY
Status: DISCONTINUED | OUTPATIENT
Start: 2020-04-28 | End: 2020-05-06 | Stop reason: HOSPADM

## 2020-04-28 RX ORDER — SODIUM CHLORIDE, SODIUM LACTATE, POTASSIUM CHLORIDE, CALCIUM CHLORIDE 600; 310; 30; 20 MG/100ML; MG/100ML; MG/100ML; MG/100ML
INJECTION, SOLUTION INTRAVENOUS CONTINUOUS
Status: DISCONTINUED | OUTPATIENT
Start: 2020-04-28 | End: 2020-04-29

## 2020-04-28 RX ORDER — ACETAMINOPHEN 325 MG/1
650 TABLET ORAL EVERY 6 HOURS PRN
Status: DISCONTINUED | OUTPATIENT
Start: 2020-04-28 | End: 2020-05-06 | Stop reason: HOSPADM

## 2020-04-28 RX ORDER — MAGNESIUM HYDROXIDE 1200 MG/15ML
3000 LIQUID ORAL CONTINUOUS
Status: DISCONTINUED | OUTPATIENT
Start: 2020-04-28 | End: 2020-05-06 | Stop reason: HOSPADM

## 2020-04-28 RX ORDER — MORPHINE SULFATE 2 MG/ML
2 INJECTION, SOLUTION INTRAMUSCULAR; INTRAVENOUS
Status: DISCONTINUED | OUTPATIENT
Start: 2020-04-28 | End: 2020-05-02

## 2020-04-28 RX ORDER — ONDANSETRON 2 MG/ML
4 INJECTION INTRAMUSCULAR; INTRAVENOUS EVERY 6 HOURS PRN
Status: DISCONTINUED | OUTPATIENT
Start: 2020-04-28 | End: 2020-05-06 | Stop reason: HOSPADM

## 2020-04-28 RX ORDER — POLYETHYLENE GLYCOL 3350 17 G/17G
17 POWDER, FOR SOLUTION ORAL DAILY
Status: DISCONTINUED | OUTPATIENT
Start: 2020-04-28 | End: 2020-05-06 | Stop reason: HOSPADM

## 2020-04-28 RX ORDER — MORPHINE SULFATE 2 MG/ML
2 INJECTION, SOLUTION INTRAMUSCULAR; INTRAVENOUS ONCE
Status: DISCONTINUED | OUTPATIENT
Start: 2020-04-28 | End: 2020-05-02

## 2020-04-28 RX ORDER — HYDROCODONE BITARTRATE AND ACETAMINOPHEN 5; 325 MG/1; MG/1
1 TABLET ORAL EVERY 6 HOURS PRN
Status: DISCONTINUED | OUTPATIENT
Start: 2020-04-28 | End: 2020-05-02

## 2020-04-28 RX ORDER — PRAVASTATIN SODIUM 20 MG
80 TABLET ORAL NIGHTLY
Status: DISCONTINUED | OUTPATIENT
Start: 2020-04-28 | End: 2020-05-06 | Stop reason: HOSPADM

## 2020-04-28 RX ORDER — ACETAMINOPHEN 650 MG/1
650 SUPPOSITORY RECTAL EVERY 6 HOURS PRN
Status: DISCONTINUED | OUTPATIENT
Start: 2020-04-28 | End: 2020-05-06 | Stop reason: HOSPADM

## 2020-04-28 RX ORDER — LATANOPROST 50 UG/ML
1 SOLUTION/ DROPS OPHTHALMIC NIGHTLY
Status: DISCONTINUED | OUTPATIENT
Start: 2020-04-28 | End: 2020-05-06 | Stop reason: HOSPADM

## 2020-04-28 RX ORDER — TRAMADOL HYDROCHLORIDE 50 MG/1
50 TABLET ORAL EVERY 4 HOURS PRN
Status: DISCONTINUED | OUTPATIENT
Start: 2020-04-28 | End: 2020-04-28 | Stop reason: HOSPADM

## 2020-04-28 RX ORDER — HYDROCODONE BITARTRATE AND ACETAMINOPHEN 5; 325 MG/1; MG/1
2 TABLET ORAL EVERY 6 HOURS PRN
Status: DISCONTINUED | OUTPATIENT
Start: 2020-04-28 | End: 2020-05-02

## 2020-04-28 RX ORDER — ATROPA BELLADONNA AND OPIUM 16.2; 6 MG/1; MG/1
60 SUPPOSITORY RECTAL EVERY 8 HOURS PRN
Status: DISCONTINUED | OUTPATIENT
Start: 2020-04-28 | End: 2020-04-28 | Stop reason: HOSPADM

## 2020-04-28 RX ORDER — ATROPA BELLADONNA AND OPIUM 16.2; 3 MG/1; MG/1
30 SUPPOSITORY RECTAL EVERY 8 HOURS PRN
Status: DISCONTINUED | OUTPATIENT
Start: 2020-04-28 | End: 2020-05-06 | Stop reason: HOSPADM

## 2020-04-28 RX ORDER — SODIUM CHLORIDE 0.9 % (FLUSH) 0.9 %
10 SYRINGE (ML) INJECTION EVERY 12 HOURS SCHEDULED
Status: DISCONTINUED | OUTPATIENT
Start: 2020-04-28 | End: 2020-05-06 | Stop reason: HOSPADM

## 2020-04-28 RX ORDER — SODIUM CHLORIDE 0.9 % (FLUSH) 0.9 %
10 SYRINGE (ML) INJECTION PRN
Status: DISCONTINUED | OUTPATIENT
Start: 2020-04-28 | End: 2020-05-06 | Stop reason: HOSPADM

## 2020-04-28 RX ADMIN — ATROPA BELLADONNA AND OPIUM 60 MG: 16.2; 6 SUPPOSITORY RECTAL at 01:03

## 2020-04-28 RX ADMIN — SODIUM CHLORIDE, POTASSIUM CHLORIDE, SODIUM LACTATE AND CALCIUM CHLORIDE: 600; 310; 30; 20 INJECTION, SOLUTION INTRAVENOUS at 12:20

## 2020-04-28 RX ADMIN — LEVOTHYROXINE SODIUM 75 MCG: 75 TABLET ORAL at 11:07

## 2020-04-28 RX ADMIN — SODIUM CHLORIDE: 9 INJECTION, SOLUTION INTRAVENOUS at 08:11

## 2020-04-28 RX ADMIN — ACETAMINOPHEN 650 MG: 325 TABLET, FILM COATED ORAL at 00:31

## 2020-04-28 RX ADMIN — SODIUM CHLORIDE 3000 ML: 900 IRRIGANT IRRIGATION at 11:06

## 2020-04-28 RX ADMIN — ATROPA BELLADONNA AND OPIUM 60 MG: 16.2; 6 SUPPOSITORY RECTAL at 07:23

## 2020-04-28 RX ADMIN — TRAMADOL HYDROCHLORIDE 50 MG: 50 TABLET ORAL at 01:02

## 2020-04-28 RX ADMIN — TRAMADOL HYDROCHLORIDE 50 MG: 50 TABLET ORAL at 05:04

## 2020-04-28 RX ADMIN — MORPHINE SULFATE 2 MG: 2 INJECTION, SOLUTION INTRAMUSCULAR; INTRAVENOUS at 08:11

## 2020-04-28 RX ADMIN — CEFTRIAXONE SODIUM 1 G: 1 INJECTION, POWDER, FOR SOLUTION INTRAMUSCULAR; INTRAVENOUS at 11:07

## 2020-04-28 ASSESSMENT — PAIN SCALES - GENERAL
PAINLEVEL_OUTOF10: 9
PAINLEVEL_OUTOF10: 10
PAINLEVEL_OUTOF10: 3
PAINLEVEL_OUTOF10: 10
PAINLEVEL_OUTOF10: 8
PAINLEVEL_OUTOF10: 8
PAINLEVEL_OUTOF10: 3

## 2020-04-28 NOTE — H&P
Cricket Vann, [address] [facility], [place], [facility] [place]   Urology Consultation      Patient:  Reta Lewis  MRN: [id number]  YOB: 1935    CHIEF COMPLAINT:  Hematuria, UTI    HISTORY OF PRESENT ILLNESS:   The patient is a 80 y.o. female who presents with hematuria and UTI, transfer from [facility] overnight and admitted to Dr Mattie Galvan. She has recurrent UTIs, last saw Dr Camila Avila with [facility] June of 2019. Follows for recurrent UTIs and incontinence, some being ESBL ecoli. Had bladder suspension in the 1970s after having 3 children. She had a bladder scope in 2016 with Dr Sarah Templeton that showed only signs of chronic cystitis, bladder biopsy negative for malignancy and urine cytology also negative. She is on aspirin and Plavix for CAD and one stent placed in 2015. She began having some tingling and burning with urinating over the past week, and says Sunday night into Monday morning she had blood in her urine 2-3 times overnight and by morning was straight blood. She has never seen blood in her urine before. She presented to [facility] where they were unable to get her urine clear and she was transferred to [facility]. She c/o 8-10/10 pain in her belly and like she needs to urinate. She denies fever, chills, cough, nausea or vomiting. She last took her aspirin and plavix yesterday morning. She feels tired and weak. She came from home where she lives with her . She was never a smoker and no chemical exposures she can think of. Her creatinine yesterday morning was 1.03, hemoglobin 12.9 and WBC 7.8, morning labs here pending. Her UA consistent with UTI and urine culture growing ecoli with no sensitivities back yet. She is on Rocephin. No upper tract imaging yet. She had prior CTAP without contrast in 10/2016 that shows bladder wall thickening and irregular but kidneys no masses or stones.      Voiding at baseline: Incontinence, UTIs ESBL ecoli in past, had bladder sling  History of UTIs:

## 2020-04-28 NOTE — PROGRESS NOTES
[address] transported [address] to   [facility] all belongings with patient at discharge.

## 2020-04-28 NOTE — PROGRESS NOTES
Called and spoke with Raul Nelson RN at [facility] gave report. He said he was going to see if there is an open place in the urology unit for the patient, then call back.

## 2020-04-28 NOTE — PLAN OF CARE
Problem: Falls - Risk of:  Goal: Will remain free from falls  Description: Will remain free from falls  4/28/2020 0341 by Ede Marte RN  Outcome: Ongoing     Problem: Falls - Risk of:  Goal: Absence of physical injury  Description: Absence of physical injury  4/28/2020 0341 by Ede Marte RN  Outcome: Ongoing     Problem: SAFETY  Goal: Free from accidental physical injury  4/28/2020 0341 by Ede Marte RN  Outcome: Ongoing     Problem: SAFETY  Goal: Free from intentional harm  4/28/2020 0341 by Ede Marte RN  Outcome: Ongoing     Problem: PAIN  Goal: Patient's pain/discomfort is manageable  4/28/2020 0341 by Ede Marte RN  Outcome: Ongoing     Problem: SKIN INTEGRITY  Goal: Skin integrity is maintained or improved  4/28/2020 0341 by Ede Marte RN  Outcome: Ongoing     Problem: Pain:  Goal: Pain level will decrease  Description: Pain level will decrease  4/28/2020 0341 by Ede Marte RN  Outcome: Ongoing     Problem: Pain:  Goal: Control of acute pain  Description: Control of acute pain  4/28/2020 0341 by Ede Marte RN  Outcome: Ongoing     Problem: Pain:  Goal: Control of chronic pain  Description: Control of chronic pain  4/28/2020 0341 by Ede Marte RN  Outcome: Ongoing

## 2020-04-28 NOTE — PROGRESS NOTES
Spoke with Paul Macdonald , explained to him patient has been having pain 10/10, how we have been having repeated clotted off fullys, how we attempted to fix problem by replacing cathter as well as increasing the [place], we also discussed the amount of hand irrigation both by this RN as well as clin lead and house supervisor that has been attempted, also discussed concerns about leaking at the fully site. , also informed that we had given a B&O suppository earlier in shift and leakage continues, also informed of firmness of abdomen as well as the color and clots in the urine that we have been able to get out, after discussing all of this information Paul Macdonald wants patient transferred to [facility] in order for the Urology residents to handle. Placing order now.

## 2020-04-28 NOTE — DISCHARGE SUMMARY
Discharge Summary      Patient ID: Deepti Porras    MRN: [id number]     Acct:  [de-identified]       Patient's PCP: Ronda Groves Date: 4/27/2020     Discharge Date: 4/28/2020      Admitting Physician: Gilles Soto MD    Discharge Physician: Jennifer Lowry MD     Discharge Diagnoses:    Primary Problem  Hematuria    Principal Problem:    Hematuria  Active Problems:    Hyperlipidemia    HTN (hypertension)    ASCVD (arteriosclerotic cardiovascular disease)    Other specified hypothyroidism  Acute cystitis with hematuria  Resolved Problems:    * No resolved hospital problems. *    Past Medical History:   Diagnosis Date    Coronary artery disease     ESBL (extended spectrum beta-lactamase) producing bacteria infection 6/30/2015    E. Coli urine    HH (hiatus hernia)     Hyperlipidemia     Hypertension     Thyroid disease          Code Status:  Full Code    Hospital Course:   H&P Reviewed. patient  With a past medical history of hypertension ASCVDpresented with gross hematuria and UTI. IV Rocephin was started. Urology services were consulted. Bladder irrigation with 3 way Hope catheter was initiated. Patient's symptoms improved with treatment. Urology Services transfer the patient to [facility] for further medical  Management.  Patient being transferred in stable condition      Consults:  Catarino Martinez TO PRIMARY CARE PROVIDER    Significant Diagnostic Studies: as above, and as follows:    Treatments: as above    Disposition:  [facility]    Discharged Condition: Stable        Discharge Medications:    Sheryle Naas   Home Medication Instructions VKI:[id number]    Printed on:04/28/20 1050   Medication Information                      albuterol sulfate HFA (VENTOLIN HFA) 108 (90 Base) MCG/ACT inhaler  1 spray by Nasal route as needed             Azelastine-Fluticasone (DYMISTA) 137-50 MCG/ACT SUSP  1 Squirt             latanoprost (XALATAN) 0.005 % ophthalmic solution  Place 1 drop into both eyes nightly             levothyroxine (SYNTHROID) 75 MCG tablet  Take 75 mcg by mouth daily             lisinopril (PRINIVIL;ZESTRIL) 10 MG tablet  Take 10 mg by mouth daily             pravastatin (PRAVACHOL) 40 MG tablet  Take 1 tablet by mouth nightly             senna-docusate (PERICOLACE) 8.6-50 MG per tablet  Take 1 tablet by mouth daily as needed constipation                          Time Spent on discharge is more than  35 min in the examination, evaluation, counseling and review of medications and discharge plan.       Electronically signed by Cristopher Cespedes MD     Copy sent to Dr. Durham Render

## 2020-04-29 ENCOUNTER — APPOINTMENT (OUTPATIENT)
Dept: NUCLEAR MEDICINE | Age: 85
DRG: 871 | End: 2020-04-29
Attending: UROLOGY
Payer: MEDICARE

## 2020-04-29 ENCOUNTER — APPOINTMENT (OUTPATIENT)
Dept: CT IMAGING | Age: 85
DRG: 871 | End: 2020-04-29
Attending: UROLOGY
Payer: MEDICARE

## 2020-04-29 ENCOUNTER — APPOINTMENT (OUTPATIENT)
Dept: GENERAL RADIOLOGY | Age: 85
DRG: 871 | End: 2020-04-29
Attending: UROLOGY
Payer: MEDICARE

## 2020-04-29 PROBLEM — E87.5 HYPERKALEMIA: Status: ACTIVE | Noted: 2020-04-29

## 2020-04-29 PROBLEM — E53.8 COBALAMIN DEFICIENCY: Status: ACTIVE | Noted: 2020-04-27

## 2020-04-29 PROBLEM — D72.829 LEUCOCYTOSIS: Status: ACTIVE | Noted: 2020-04-29

## 2020-04-29 PROBLEM — N17.9 AKI (ACUTE KIDNEY INJURY) (HCC): Status: ACTIVE | Noted: 2020-04-29

## 2020-04-29 PROBLEM — R65.20 SEVERE SEPSIS (HCC): Status: ACTIVE | Noted: 2020-04-29

## 2020-04-29 PROBLEM — D64.9 ANEMIA: Status: ACTIVE | Noted: 2020-04-29

## 2020-04-29 PROBLEM — Z16.12 UTI DUE TO EXTENDED-SPECTRUM BETA LACTAMASE (ESBL) PRODUCING ESCHERICHIA COLI: Status: ACTIVE | Noted: 2020-04-29

## 2020-04-29 PROBLEM — N39.0 UTI DUE TO EXTENDED-SPECTRUM BETA LACTAMASE (ESBL) PRODUCING ESCHERICHIA COLI: Status: ACTIVE | Noted: 2020-04-29

## 2020-04-29 PROBLEM — B96.29 UTI DUE TO EXTENDED-SPECTRUM BETA LACTAMASE (ESBL) PRODUCING ESCHERICHIA COLI: Status: ACTIVE | Noted: 2020-04-29

## 2020-04-29 PROBLEM — E03.8 OTHER SPECIFIED HYPOTHYROIDISM: Status: RESOLVED | Noted: 2020-04-27 | Resolved: 2020-04-29

## 2020-04-29 PROBLEM — I95.9 HYPOTENSION: Status: ACTIVE | Noted: 2020-04-29

## 2020-04-29 PROBLEM — A41.9 SEVERE SEPSIS (HCC): Status: ACTIVE | Noted: 2020-04-29

## 2020-04-29 LAB
-: ABNORMAL
AMORPHOUS: ABNORMAL
ANION GAP SERPL CALCULATED.3IONS-SCNC: 14 MMOL/L (ref 9–17)
BACTERIA: ABNORMAL
BILIRUBIN URINE: ABNORMAL
BNP INTERPRETATION: ABNORMAL
BNP INTERPRETATION: ABNORMAL
BUN BLDV-MCNC: 33 MG/DL (ref 8–23)
BUN/CREAT BLD: ABNORMAL (ref 9–20)
C-REACTIVE PROTEIN: 331.5 MG/L (ref 0–5)
C-REACTIVE PROTEIN: 361 MG/L (ref 0–5)
CALCIUM SERPL-MCNC: 8.4 MG/DL (ref 8.6–10.4)
CASTS UA: ABNORMAL /LPF (ref 0–8)
CHLORIDE BLD-SCNC: 103 MMOL/L (ref 98–107)
CHLORIDE, UR: 88 MMOL/L
CO2: 20 MMOL/L (ref 20–31)
COLOR: ABNORMAL
COMPLEMENT C3: 106 MG/DL (ref 90–180)
COMPLEMENT C3: 109 MG/DL (ref 90–180)
COMPLEMENT C4: 39 MG/DL (ref 10–40)
COMPLEMENT C4: 40 MG/DL (ref 10–40)
CORTISOL COLLECTION INFO: ABNORMAL
CORTISOL: 22.3 UG/DL (ref 2.7–18.4)
CREAT SERPL-MCNC: 1.66 MG/DL (ref 0.5–0.9)
CREATININE URINE: 126 MG/DL (ref 28–217)
CREATININE URINE: 61.2 MG/DL (ref 28–217)
CRYSTALS, UA: ABNORMAL /HPF
CULTURE: ABNORMAL
EKG ATRIAL RATE: 94 BPM
EKG P AXIS: 28 DEGREES
EKG P-R INTERVAL: 144 MS
EKG Q-T INTERVAL: 334 MS
EKG QRS DURATION: 66 MS
EKG QTC CALCULATION (BAZETT): 417 MS
EKG R AXIS: 62 DEGREES
EKG T AXIS: 27 DEGREES
EKG VENTRICULAR RATE: 94 BPM
EOSINOPHIL,URINE: NORMAL
EPITHELIAL CELLS UA: ABNORMAL /HPF (ref 0–5)
FERRITIN: 141 UG/L (ref 13–150)
FREE KAPPA/LAMBDA RATIO: 2.1 (ref 0.26–1.65)
GFR AFRICAN AMERICAN: 36 ML/MIN
GFR NON-AFRICAN AMERICAN: 29 ML/MIN
GFR SERPL CREATININE-BSD FRML MDRD: ABNORMAL ML/MIN/{1.73_M2}
GFR SERPL CREATININE-BSD FRML MDRD: ABNORMAL ML/MIN/{1.73_M2}
GLUCOSE BLD-MCNC: 112 MG/DL (ref 70–99)
GLUCOSE BLD-MCNC: 87 MG/DL (ref 65–105)
GLUCOSE URINE: ABNORMAL
HAV IGM SER IA-ACNC: NONREACTIVE
HCT VFR BLD CALC: 27.8 % (ref 36.3–47.1)
HEMOGLOBIN: 8.8 G/DL (ref 11.9–15.1)
HEPATITIS B CORE IGM ANTIBODY: NONREACTIVE
HEPATITIS B SURFACE ANTIGEN: NONREACTIVE
HEPATITIS C ANTIBODY: NONREACTIVE
KAPPA FREE LIGHT CHAINS QNT: 2.44 MG/DL (ref 0.37–1.94)
KETONES, URINE: NEGATIVE
LACTIC ACID, SEPSIS WHOLE BLOOD: 1.4 MMOL/L (ref 0.5–1.9)
LACTIC ACID, SEPSIS WHOLE BLOOD: 5 MMOL/L (ref 0.5–1.9)
LACTIC ACID, SEPSIS: ABNORMAL MMOL/L (ref 0.5–1.9)
LACTIC ACID, SEPSIS: NORMAL MMOL/L (ref 0.5–1.9)
LAMBDA FREE LIGHT CHAINS QNT: 1.16 MG/DL (ref 0.57–2.63)
LEUKOCYTE ESTERASE, URINE: ABNORMAL
Lab: ABNORMAL
MCH RBC QN AUTO: 32.1 PG (ref 25.2–33.5)
MCHC RBC AUTO-ENTMCNC: 31.7 G/DL (ref 28.4–34.8)
MCV RBC AUTO: 101.5 FL (ref 82.6–102.9)
MUCUS: ABNORMAL
NITRITE, URINE: POSITIVE
NRBC AUTOMATED: 0 PER 100 WBC
OTHER OBSERVATIONS UA: ABNORMAL
PDW BLD-RTO: 14.1 % (ref 11.8–14.4)
PH UA: 5 (ref 5–8)
PLATELET # BLD: 245 K/UL (ref 138–453)
PMV BLD AUTO: 9.6 FL (ref 8.1–13.5)
POTASSIUM SERPL-SCNC: 5.3 MMOL/L (ref 3.7–5.3)
POTASSIUM SERPL-SCNC: 5.7 MMOL/L (ref 3.7–5.3)
POTASSIUM, UR: 34.9 MMOL/L
PRO-BNP: 1678 PG/ML
PRO-BNP: 2057 PG/ML
PROTEIN UA: ABNORMAL
RBC # BLD: 2.74 M/UL (ref 3.95–5.11)
RBC UA: ABNORMAL /HPF (ref 0–4)
RENAL EPITHELIAL, UA: ABNORMAL /HPF
SARS-COV-2, PCR: NORMAL
SARS-COV-2, RAPID: NORMAL
SARS-COV-2: NOT DETECTED
SEDIMENTATION RATE, ERYTHROCYTE: 91 MM (ref 0–20)
SODIUM BLD-SCNC: 137 MMOL/L (ref 135–144)
SODIUM,UR: 100 MMOL/L
SOURCE: NORMAL
SPECIFIC GRAVITY UA: 1.01 (ref 1–1.03)
SPECIMEN DESCRIPTION: ABNORMAL
TOTAL PROTEIN, URINE: 72 MG/DL
TRICHOMONAS: ABNORMAL
TSH SERPL DL<=0.05 MIU/L-ACNC: 1.93 MIU/L (ref 0.3–5)
TURBIDITY: ABNORMAL
URINE HGB: ABNORMAL
UROBILINOGEN, URINE: NORMAL
WBC # BLD: 17.8 K/UL (ref 3.5–11.3)
WBC UA: ABNORMAL /HPF (ref 0–5)
YEAST: ABNORMAL

## 2020-04-29 PROCEDURE — 78580 LUNG PERFUSION IMAGING: CPT

## 2020-04-29 PROCEDURE — 84155 ASSAY OF PROTEIN SERUM: CPT

## 2020-04-29 PROCEDURE — 6360000002 HC RX W HCPCS: Performed by: STUDENT IN AN ORGANIZED HEALTH CARE EDUCATION/TRAINING PROGRAM

## 2020-04-29 PROCEDURE — 93010 ELECTROCARDIOGRAM REPORT: CPT | Performed by: INTERNAL MEDICINE

## 2020-04-29 PROCEDURE — 2700000000 HC OXYGEN THERAPY PER DAY

## 2020-04-29 PROCEDURE — 82570 ASSAY OF URINE CREATININE: CPT

## 2020-04-29 PROCEDURE — 2060000000 HC ICU INTERMEDIATE R&B

## 2020-04-29 PROCEDURE — 83605 ASSAY OF LACTIC ACID: CPT

## 2020-04-29 PROCEDURE — 2500000003 HC RX 250 WO HCPCS: Performed by: INTERNAL MEDICINE

## 2020-04-29 PROCEDURE — 83880 ASSAY OF NATRIURETIC PEPTIDE: CPT

## 2020-04-29 PROCEDURE — 81001 URINALYSIS AUTO W/SCOPE: CPT

## 2020-04-29 PROCEDURE — 86038 ANTINUCLEAR ANTIBODIES: CPT

## 2020-04-29 PROCEDURE — 83883 ASSAY NEPHELOMETRY NOT SPEC: CPT

## 2020-04-29 PROCEDURE — 86160 COMPLEMENT ANTIGEN: CPT

## 2020-04-29 PROCEDURE — 87040 BLOOD CULTURE FOR BACTERIA: CPT

## 2020-04-29 PROCEDURE — 86334 IMMUNOFIX E-PHORESIS SERUM: CPT

## 2020-04-29 PROCEDURE — 85027 COMPLETE CBC AUTOMATED: CPT

## 2020-04-29 PROCEDURE — 2580000003 HC RX 258: Performed by: STUDENT IN AN ORGANIZED HEALTH CARE EDUCATION/TRAINING PROGRAM

## 2020-04-29 PROCEDURE — 99222 1ST HOSP IP/OBS MODERATE 55: CPT | Performed by: INTERNAL MEDICINE

## 2020-04-29 PROCEDURE — 84300 ASSAY OF URINE SODIUM: CPT

## 2020-04-29 PROCEDURE — 84165 PROTEIN E-PHORESIS SERUM: CPT

## 2020-04-29 PROCEDURE — 36415 COLL VENOUS BLD VENIPUNCTURE: CPT

## 2020-04-29 PROCEDURE — 86140 C-REACTIVE PROTEIN: CPT

## 2020-04-29 PROCEDURE — 84443 ASSAY THYROID STIM HORMONE: CPT

## 2020-04-29 PROCEDURE — U0002 COVID-19 LAB TEST NON-CDC: HCPCS

## 2020-04-29 PROCEDURE — 80048 BASIC METABOLIC PNL TOTAL CA: CPT

## 2020-04-29 PROCEDURE — 82947 ASSAY GLUCOSE BLOOD QUANT: CPT

## 2020-04-29 PROCEDURE — 85651 RBC SED RATE NONAUTOMATED: CPT

## 2020-04-29 PROCEDURE — 94640 AIRWAY INHALATION TREATMENT: CPT

## 2020-04-29 PROCEDURE — 83036 HEMOGLOBIN GLYCOSYLATED A1C: CPT

## 2020-04-29 PROCEDURE — 74176 CT ABD & PELVIS W/O CONTRAST: CPT

## 2020-04-29 PROCEDURE — 87205 SMEAR GRAM STAIN: CPT

## 2020-04-29 PROCEDURE — 82436 ASSAY OF URINE CHLORIDE: CPT

## 2020-04-29 PROCEDURE — 3430000000 HC RX DIAGNOSTIC RADIOPHARMACEUTICAL: Performed by: STUDENT IN AN ORGANIZED HEALTH CARE EDUCATION/TRAINING PROGRAM

## 2020-04-29 PROCEDURE — 99223 1ST HOSP IP/OBS HIGH 75: CPT | Performed by: INTERNAL MEDICINE

## 2020-04-29 PROCEDURE — 84156 ASSAY OF PROTEIN URINE: CPT

## 2020-04-29 PROCEDURE — 80074 ACUTE HEPATITIS PANEL: CPT

## 2020-04-29 PROCEDURE — 71045 X-RAY EXAM CHEST 1 VIEW: CPT

## 2020-04-29 PROCEDURE — 93970 EXTREMITY STUDY: CPT

## 2020-04-29 PROCEDURE — 93005 ELECTROCARDIOGRAM TRACING: CPT | Performed by: STUDENT IN AN ORGANIZED HEALTH CARE EDUCATION/TRAINING PROGRAM

## 2020-04-29 PROCEDURE — 2580000003 HC RX 258: Performed by: INTERNAL MEDICINE

## 2020-04-29 PROCEDURE — 82728 ASSAY OF FERRITIN: CPT

## 2020-04-29 PROCEDURE — 82533 TOTAL CORTISOL: CPT

## 2020-04-29 PROCEDURE — 84132 ASSAY OF SERUM POTASSIUM: CPT

## 2020-04-29 PROCEDURE — 84133 ASSAY OF URINE POTASSIUM: CPT

## 2020-04-29 PROCEDURE — 6370000000 HC RX 637 (ALT 250 FOR IP): Performed by: STUDENT IN AN ORGANIZED HEALTH CARE EDUCATION/TRAINING PROGRAM

## 2020-04-29 PROCEDURE — A9540 TC99M MAA: HCPCS | Performed by: STUDENT IN AN ORGANIZED HEALTH CARE EDUCATION/TRAINING PROGRAM

## 2020-04-29 RX ORDER — NICOTINE POLACRILEX 4 MG
15 LOZENGE BUCCAL PRN
Status: DISCONTINUED | OUTPATIENT
Start: 2020-04-29 | End: 2020-05-06 | Stop reason: HOSPADM

## 2020-04-29 RX ORDER — DEXTROSE MONOHYDRATE 25 G/50ML
12.5 INJECTION, SOLUTION INTRAVENOUS PRN
Status: DISCONTINUED | OUTPATIENT
Start: 2020-04-29 | End: 2020-05-06 | Stop reason: HOSPADM

## 2020-04-29 RX ORDER — SODIUM CHLORIDE 9 MG/ML
INJECTION, SOLUTION INTRAVENOUS CONTINUOUS
Status: DISCONTINUED | OUTPATIENT
Start: 2020-04-29 | End: 2020-04-29

## 2020-04-29 RX ORDER — DEXTROSE MONOHYDRATE 25 G/50ML
25 INJECTION, SOLUTION INTRAVENOUS ONCE
Status: COMPLETED | OUTPATIENT
Start: 2020-04-29 | End: 2020-04-29

## 2020-04-29 RX ORDER — 0.9 % SODIUM CHLORIDE 0.9 %
500 INTRAVENOUS SOLUTION INTRAVENOUS ONCE
Status: COMPLETED | OUTPATIENT
Start: 2020-04-29 | End: 2020-04-29

## 2020-04-29 RX ORDER — LEVALBUTEROL INHALATION SOLUTION 0.63 MG/3ML
0.63 SOLUTION RESPIRATORY (INHALATION) EVERY 8 HOURS PRN
Status: DISCONTINUED | OUTPATIENT
Start: 2020-04-29 | End: 2020-04-29

## 2020-04-29 RX ORDER — ALBUTEROL SULFATE 90 UG/1
1 AEROSOL, METERED RESPIRATORY (INHALATION) EVERY 4 HOURS PRN
Status: DISCONTINUED | OUTPATIENT
Start: 2020-04-29 | End: 2020-05-06

## 2020-04-29 RX ORDER — BUDESONIDE AND FORMOTEROL FUMARATE DIHYDRATE 80; 4.5 UG/1; UG/1
2 AEROSOL RESPIRATORY (INHALATION) 2 TIMES DAILY
Status: DISCONTINUED | OUTPATIENT
Start: 2020-04-29 | End: 2020-05-06 | Stop reason: HOSPADM

## 2020-04-29 RX ORDER — ALBUTEROL SULFATE 2.5 MG/3ML
2.5 SOLUTION RESPIRATORY (INHALATION) EVERY 6 HOURS PRN
Status: DISCONTINUED | OUTPATIENT
Start: 2020-04-29 | End: 2020-04-29

## 2020-04-29 RX ORDER — DEXTROSE MONOHYDRATE 50 MG/ML
100 INJECTION, SOLUTION INTRAVENOUS PRN
Status: DISCONTINUED | OUTPATIENT
Start: 2020-04-29 | End: 2020-05-06 | Stop reason: HOSPADM

## 2020-04-29 RX ADMIN — ACETAMINOPHEN 650 MG: 325 TABLET ORAL at 21:10

## 2020-04-29 RX ADMIN — PRAVASTATIN SODIUM 80 MG: 20 TABLET ORAL at 21:09

## 2020-04-29 RX ADMIN — SODIUM CHLORIDE 500 ML: 9 INJECTION, SOLUTION INTRAVENOUS at 09:50

## 2020-04-29 RX ADMIN — ALBUTEROL SULFATE 2.5 MG: 2.5 SOLUTION RESPIRATORY (INHALATION) at 09:09

## 2020-04-29 RX ADMIN — ALBUTEROL SULFATE 2.5 MG: 2.5 SOLUTION RESPIRATORY (INHALATION) at 06:12

## 2020-04-29 RX ADMIN — SODIUM CHLORIDE, PRESERVATIVE FREE 10 ML: 5 INJECTION INTRAVENOUS at 21:08

## 2020-04-29 RX ADMIN — INSULIN HUMAN 10 UNITS: 100 INJECTION, SOLUTION PARENTERAL at 09:09

## 2020-04-29 RX ADMIN — SODIUM CHLORIDE: 9 INJECTION, SOLUTION INTRAVENOUS at 11:57

## 2020-04-29 RX ADMIN — SODIUM CHLORIDE 500 ML: 9 INJECTION, SOLUTION INTRAVENOUS at 11:53

## 2020-04-29 RX ADMIN — MEROPENEM 1 G: 1 INJECTION, POWDER, FOR SOLUTION INTRAVENOUS at 11:52

## 2020-04-29 RX ADMIN — ONDANSETRON 4 MG: 2 INJECTION INTRAMUSCULAR; INTRAVENOUS at 05:36

## 2020-04-29 RX ADMIN — SODIUM BICARBONATE: 84 INJECTION, SOLUTION INTRAVENOUS at 20:04

## 2020-04-29 RX ADMIN — LEVOTHYROXINE SODIUM 75 MCG: 75 TABLET ORAL at 11:52

## 2020-04-29 RX ADMIN — Medication 2.9 MILLICURIE: at 14:40

## 2020-04-29 RX ADMIN — DEXTROSE MONOHYDRATE 25 G: 25 INJECTION, SOLUTION INTRAVENOUS at 09:09

## 2020-04-29 RX ADMIN — ONDANSETRON 4 MG: 2 INJECTION INTRAMUSCULAR; INTRAVENOUS at 20:11

## 2020-04-29 ASSESSMENT — PAIN SCALES - GENERAL
PAINLEVEL_OUTOF10: 8
PAINLEVEL_OUTOF10: 0

## 2020-04-29 NOTE — FLOWSHEET NOTE
Writer claudia [facility] Internal Med Resident regarding patients potassium of 5.3.   Mejia Courser, RN

## 2020-04-29 NOTE — PLAN OF CARE
BRONCHOSPASM/BRONCHOCONSTRICTION     [x]         IMPROVE AERATION/BREATH SOUNDS  [x]   ADMINISTER BRONCHODILATOR THERAPY AS APPROPRIATE  [x]   ASSESS BREATH SOUNDS  []   IMPLEMENT AEROSOL/MDI PROTOCOL  [x]   PATIENT EDUCATION AS NEEDED     PROVIDE ADEQUATE OXYGENATION WITH ACCEPTABLE SP02/ABG'S    [x]  IDENTIFY APPROPRIATE OXYGEN THERAPY  [x]   MONITOR SP02/ABG'S AS NEEDED   [x]   PATIENT EDUCATION AS NEEDED

## 2020-04-29 NOTE — PROGRESS NOTES
Dwayne Pearl PRISCILLAatient Assessment complete. Hematuria [R31.9] . Vitals:    04/29/20 0515   BP: 100/62   Pulse: 98   Resp:    Temp:    SpO2: 100%   . Patients home meds are   Prior to Admission medications    Medication Sig Start Date End Date Taking?  Authorizing Provider   lisinopril (PRINIVIL;ZESTRIL) 10 MG tablet Take 10 mg by mouth daily    Historical Provider, MD   latanoprost (XALATAN) 0.005 % ophthalmic solution Place 1 drop into both eyes nightly    Historical Provider, MD   senna-docusate (Andrés Door) 8.6-50 MG per tablet Take 1 tablet by mouth daily as needed constipation    Historical Provider, MD   albuterol sulfate HFA (VENTOLIN HFA) 108 (90 Base) MCG/ACT inhaler 1 spray by Nasal route as needed 12/11/19 12/10/20  Historical Provider, MD   Azelastine-Fluticasone (DYMISTA) 137-50 MCG/ACT SUSP 1 Squirt    Historical Provider, MD   levothyroxine (SYNTHROID) 75 MCG tablet Take 75 mcg by mouth daily    Historical Provider, MD   pravastatin (PRAVACHOL) 40 MG tablet Take 1 tablet by mouth nightly  Patient taking differently: Take 80 mg by mouth nightly Changed in Nov 2019 7/31/15   Cherrie Crabtree DO   .      RR 18  Breath Sounds: exp wheeze       · Bronchodilator assessment at level  3  ·   ·   ·   · []    Bronchodilator Assessment  BRONCHODILATOR ASSESSMENT SCORE  Score 0 1 2 3 4 5   Breath Sounds   []  Patient Baseline []  No Wheeze good aeration []  Faint, scattered wheezing, good aeration [x]  Expiratory Wheezing and or moderately diminished []  Insp/Exp wheeze and/or very diminished []  Insp/Exp and/ or marked distress   Respiratory Rate   []  Patient Baseline []  Less than 20 [x]  Less than 20 []  20-25 []  Greater than 25 []  Greater than 25   Peak flow % of Pred or PB []  NA   []  Greater than 90%  []  81-90% []  71-80% []  Less than or equal to 70%  or unable to perform []  Unable due to Respiratory Distress   Dyspnea re []  Patient Baseline []  No SOB []  No SOB [x]  SOB on exertion []  SOB

## 2020-04-29 NOTE — CONSULTS
swelling or deformities  Hematologic: No bleeding or bruising. Neurologic: No headache, weakness, numbness, or tingling. Integument: No rash, no ulcers. Psychiatric: No depression. Endocrine: No polyuria, no polydipsia, no polyphagia. Physical Examination :     Patient Vitals for the past 8 hrs:   BP Temp Temp src Pulse Resp SpO2   04/29/20 1200 (!) 89/44 -- -- 96 -- --   04/29/20 1045 (!) 92/48 -- -- 97 -- --   04/29/20 0930 (!) 81/46 -- -- 105 -- --   04/29/20 0745 (!) 89/50 98.7 °F (37.1 °C) Temporal 95 18 100 %   04/29/20 0515 100/62 -- -- 98 -- 100 %   04/29/20 0415 (!) 97/56 -- -- 100 20 --     General Appearance: Awake, alert, and in no apparent distress. Heavy set  Head:  Normocephalic, no trauma. On nasal 02  Eyes: Pupils equal, round, reactive to light and accommodation; extraocular movements intact; sclera anicteric; conjunctivae pink. No embolic phenomena. ENT: Oropharynx clear, without erythema, exudate, or thrush. No tenderness of sinuses. Mouth/throat: mucosa pink and moist. No lesions. Neck:Supple, without lymphadenopathy. Thyroid normal, No bruits. Pulmonary/Chest: Distant sounds. Clear to auscultation, without wheezes, rales, or rhonchi. No dullness to percussion. Cardiovascular: Regular rate and rhythm without murmurs, rubs, or gallops. Abdomen: Soft, non tender. Bowel sounds normal. No organomegaly  All four Extremities: No cyanosis, clubbing, edema, or effusions. Neurologic: No gross sensory or motor deficits. Skin: Warm and dry with good turgor. Signs of peripheral arterial insufficiency. No ulcerations. No open wounds.     Medical Decision Making -Laboratory:   I have independently reviewed/ordered the following labs:    CBC with Differential:   Recent Labs     04/27/20  0946 04/28/20  0937 04/28/20  1810 04/29/20  0624   WBC 7.8 12.6*  --  17.8*   HGB 12.9 10.0* 10.1* 8.8*   HCT 39.9 32.1* 34.2* 27.8*    227  --  245   LYMPHOPCT 15* 5*  --   --    MONOPCT 9* 7  --   -- to   superimposed acute bronchitis. Medical Decision [place]:     4/29/2020  1:11 AM - Yaima Johnson Incoming Lab Results From [facility]     Specimen Information: Urine, clean catch        Component Collected Lab   Specimen Description 04/27/2020  9:50 AM - [address] [facility]   . CLEAN CATCH URINE    Special Requests 04/27/2020  9:50 AM [facility]- [address]  Lab   NOT REPORTED    Culture Abnormal  04/27/2020  9:50 AM [address] >[id number] CFU/ML THIS ORGANISM IS AN EXTENDED-SPECTRUM BETA-LACTAMASE  AND RESISTANCE TO THERAPY WITH PENICILLINS, CEPHALOSPORINS AND AZTREONAM IS EXPECTED.  THESE ORGANISMS GENERALLY REMAIN SUSCEPTIBLE TO CARBAPENEMS.  CONSIDER ID CONSULTATION. Testing Performed By     [facility] Louie Martinez Name Director Address Valid Date Range   [address]  [address]  [address] 08/30/17 0801-Present   [facility] [address] [address] Connor Ayala MD [address] [facility]. [facility] [address] 11/17/17 1521-Present   Susceptibility     Escherichia coli (1)     Antibiotic Interpretation CORRINA Status    amikacin   Final     NOT REPORTED   ampicillin Resistant  Final     >=32  RESISTANT   ampicillin-sulbactam   Final     NOT REPORTED   aztreonam Resistant  Final     <=1  RESISTANT   ceFAZolin Resistant  Final     >=64  RESISTANT   ceFAZolin Resistant Cefazolin sensitivity results can be used to predict the effectiveness of oral cephalosporins (eg.  Cephalexin) in uncomplicated Urinary Tract Infections due to E. coli, K. pneumoniae, and P. mirabilis Final    cefepime Resistant  Final     <=1  RESISTANT   cefTRIAXone Resistant  Final     >=64  RESISTANT   ciprofloxacin Resistant  Final     >=4  RESISTANT   ertapenem   Final     NOT REPORTED   Confirmatory Extended Spectrum Beta-Lactamase Positive POSITIVE Final    gentamicin Sensitive  Final

## 2020-04-29 NOTE — H&P
[address]     Department of Internal Medicine - Staff Internal Medicine Teaching Service          CONSULT  NOTE / HISTORY AND PHYSICAL EXAMINATION   Date: 4/29/2020  Patient Name: Ruperto Ibarra  Date of admission: 4/28/2020  5:27 AM  YOB: 1935  PCP: Jac Mcarthur  History Obtained From:  patient, electronic medical record    Consult Reason:     Consult Reason: JOSIAH and hyperkalemia    HISTORY OF PRESENTING ILLNESS     The patient is a pleasant 80 y.o. female presents with a chief complaint of hematuria and dysuria and was found to have hematuria and UTI. Patient was admitted under Urology service and underwent bladder irrigation. Due to JOSIAH and hyperkalemia, patient's care was transferred to medicine service. Last Echo  2015 Left ventricle is normal in size. Global left ventricular systolic function  is normal. Estimated ejection fraction is 55-60% . Mild left ventricular hypertrophy. Mild aortic insufficiency. Mild mitral regurgitation. Mild-moderate tricuspid regurgitation. Mild pulmonary hypertension. Last Cath  In 2013    Last lipid profile  4/25/19- lipid panel within normal limits    Last TSH 4/25/19- 2.7    Last HbA1C pending    On my evaluation,  Afebrile  Hypotensive  Heart rate in 90s  On 3 lpm of oxygen via nasal cannula and saturating well. Potassium 5. BUN 33  Creat 1.66  Lactic acid 5.0    ESR 91  Pro BNP 1678  Cortisol 22.3  WBC 17.8  Hemoglobin 8.8  Urine culture- ESBL E.Coli  C3 109  C4 39  UA- red color, turbid, positive for nitrites and leucocyte esterase  Urine protein 72  Urine creatinine 61.2  Urine sodium 100    CT Abdomen   1. No ureteral calculi. 2. High attenuation material in the bladder which may be related to blood   products.  Cystoscopy may be of benefit for further evaluation. 3. 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    4. Colonic diverticulosis. 5. Atherosclerotic disease. 6. Small amount of free pelvic fluid of uncertain etiology. 7. Bilateral inguinal hernias containing fat. CXR-   The patient's stomach overlies the left lower 2/3 of the chest, which has   increased from the previous known hiatal hernia seen in 2013.  This could be   related to worsening of the patient's hiatal hernia, though a component of an   elevated left hemidiaphragm is in the differential.  This would be best   assessed with a CT scan of the chest if clinically concerned.       Cardiomegaly.       COPD, with some bronchial thickening noted which may be related to   superimposed acute bronchitis. NM lung- pending    Review of Systems:  General ROS: Completed and except as mentioned above were negative   HEENT ROS: Completed and except as mentioned above were negative   Allergy and Immunology ROS:  Completed and except as mentioned above were negative  Hematological and Lymphatic ROS:  Completed and except as mentioned above were negative  Respiratory ROS:  Completed and except as mentioned above were negative  Cardiovascular ROS:  Completed and except as mentioned above were negative  Gastrointestinal ROS: Completed and except as mentioned above were negative  Genito-Urinary ROS:  Completed and except as mentioned above were negative  Musculoskeletal ROS:  Completed and except as mentioned above were negative  Neurological ROS:  Completed and except as mentioned above were negative  Skin & Dermatological ROS:  Completed and except as mentioned above were negative  Psychological ROS:  Completed and except as mentioned above were negative    PAST MEDICAL HISTORY     Past Medical History:   Diagnosis Date    Coronary artery disease     ESBL (extended spectrum beta-lactamase) producing bacteria infection 6/30/2015    E.  Coli urine    HH (hiatus hernia)     Hyperlipidemia     Hypertension     Thyroid disease        PAST SURGICAL HISTORY     Past

## 2020-04-29 NOTE — PROGRESS NOTES
[facility] [place]  Urology Progress Note    Subjective: No acute events overnight. Denies f/c/n/v/cp/sob. Mild pain from catheter. CBI very fast drip running clear, no clots. Patient Vitals for the past 24 hrs:   BP Temp Temp src Pulse Resp SpO2   04/29/20 0515 100/62 -- -- 98 -- 100 %   04/29/20 0415 (!) 97/56 -- -- 100 20 --   04/29/20 0400 (!) 85/48 98.3 °F (36.8 °C) Oral 93 18 98 %   04/29/20 0030 (!) 92/56 97.5 °F (36.4 °C) Oral 97 20 94 %   04/28/20 1928 (!) 91/44 97.6 °F (36.4 °C) Oral 98 18 96 %   04/28/20 1815 (!) 99/43 -- -- -- -- --   04/28/20 1536 93/60 98.3 °F (36.8 °C) Oral 90 20 99 %   04/28/20 1122 101/65 98.9 °F (37.2 °C) Oral 88 20 96 %       Intake/Output Summary (Last 24 hours) at 4/29/2020 0718  Last data filed at 4/29/2020 0650  Gross per 24 hour   Intake 1044 ml   Output 2400 ml   Net -1356 ml       Recent Labs     04/27/20  0946 04/28/20  0937 04/28/20  1810 04/29/20  0624   WBC 7.8 12.6*  --  17.8*   HGB 12.9 10.0* 10.1* 8.8*   HCT 39.9 32.1* 34.2* 27.8*   MCV 95.8 99.7  --  101.5    227  --  245     Recent Labs     04/27/20  0946 04/28/20  0937 04/29/20  0624    136 137   K 4.7 4.5 5.7*    104 103   CO2 24 17* 20   BUN 17 21 33*   CREATININE 1.03* 1.60* 1.66*       Recent Labs     04/27/20  0950   COLORU YELLOW   PHUR 7.5   WBCUA 10 TO 20   RBCUA TOO NUMEROUS TO COUNT   MUCUS NOT REPORTED   TRICHOMONAS NOT REPORTED   YEAST NOT REPORTED   BACTERIA MANY*   SPECGRAV 1.019   LEUKOCYTESUR SMALL*   UROBILINOGEN Normal   BILIRUBINUR NEGATIVE       Additional Lab/culture results:    Physical Exam:   NAD, A/Ox3  RRR, palpable radial pulses  Equal chest rise, normal inspiratory effort  Soft, NT/ND. No peritoneal signs. No flank pain.   No bladder distension/tenderness noted  3 way jung catheter in, CBI on wide open, crystal clear output no clots  Warm extremities, no calf tenderness      Interval Imaging Findings:    Impression:

## 2020-04-29 NOTE — CONSULTS
cannula to maintain saturation 97 to 98%. Because of hypoxia and concern for pulmonary embolism because of recent travel pulmonary service was consulted. Patient was also placed on airborne precaution and COVID testing was ordered by medicine service because of recent travel. She is not a smoker no history of COPD asthma no history of oxygen use, she denies shortness of breath cough wheezing chest pain chest tightness. Past Medical History:        Diagnosis Date    Coronary artery disease     ESBL (extended spectrum beta-lactamase) producing bacteria infection 6/30/2015    E. Coli urine    HH (hiatus hernia)     Hyperlipidemia     Hypertension     Thyroid disease        Past Surgical History:        Procedure Laterality Date    ANGIOPLASTY      BLADDER SUSPENSION      CATARACT REMOVAL WITH IMPLANT Bilateral     TOTAL KNEE ARTHROPLASTY Right        Allergies: Allergies   Allergen Reactions    Bactrim [Sulfamethoxazole-Trimethoprim] Hives    Sulfamethoxazole-Trimethoprim          Home Meds:   Prior to Admission medications    Medication Sig Start Date End Date Taking?  Authorizing Provider   lisinopril (PRINIVIL;ZESTRIL) 10 MG tablet Take 10 mg by mouth daily    Historical Provider, MD   latanoprost (XALATAN) 0.005 % ophthalmic solution Place 1 drop into both eyes nightly    Historical Provider, MD   senna-docusate (Kalpesh Coral) 8.6-50 MG per tablet Take 1 tablet by mouth daily as needed constipation    Historical Provider, MD   albuterol sulfate HFA (VENTOLIN HFA) 108 (90 Base) MCG/ACT inhaler 1 spray by Nasal route as needed 12/11/19 12/10/20  Historical Provider, MD   Azelastine-Fluticasone (DYMISTA) 137-50 MCG/ACT SUSP 1 Squirt    Historical Provider, MD   levothyroxine (SYNTHROID) 75 MCG tablet Take 75 mcg by mouth daily    Historical Provider, MD   pravastatin (PRAVACHOL) 40 MG tablet Take 1 tablet by mouth nightly  Patient taking differently: Take 80 mg by mouth nightly Changed in Nov 2019 7/31/15   Jossie Austin DO       Social History:   TOBACCO:   reports that she has never smoked. She has never used smokeless tobacco.  ETOH:   reports no history of alcohol use. OCCUPATION:      Family History:       Problem Relation Age of Onset    Diabetes Brother     Stroke Father     Coronary Art Dis Other         siblings X3       Immunizations: There is no immunization history on file for this patient.     REVIEW OF SYSTEMS:  CONSTITUTIONAL:  positive for  fatigue and decreased appetite and negative for fever and shaking chills  EYES:  negative for  double vision, blurred vision, eye discharge, visual disturbance, irritation and redness  HEENT:  negative for  nasal congestion, sore mouth, sore throat, hoarseness and voice change  RESPIRATORY:  negative for  dry cough, cough with sputum, dyspnea, wheezing, hemoptysis, chest pain and pleuritic pain  CARDIOVASCULAR:  negative for  chest pain, dyspnea, palpitations, orthopnea, PND, early saiety, edema, syncope  GASTROINTESTINAL:  positive for nausea and abdominal pain  negative for vomiting, diarrhea, constipation, pruritus, abdominal distention, jaundice, dysphagia, hematemesis and hemtochezia  GENITOURINARY:  positive for frequency, dysuria and hematuria  HEMATOLOGIC/LYMPHATIC:  negative for easy bruising, bleeding, lymphadenopathy and petechiae  ALLERGIC/IMMUNOLOGIC:  negative for recurrent infections, urticaria, hay fever and angioedema  ENDOCRINE:  negative for heat intolerance, cold intolerance, tremor and weight changes  MUSCULOSKELETAL:  negative for  myalgias, arthralgias and joint swelling  NEUROLOGICAL:  negative for headaches, dizziness, seizures, memory problems, speech problems, visual disturbance, dysphagia, weakness, numbness and tingling  BEHAVIOR/PSYCH:  negative        Physical Exam:    Vitals: BP (!) 92/48   Pulse 97   Temp 98.7 °F (37.1 °C) (Temporal)   Resp 18   SpO2 100%     Physical Examination:   General appearance - oriented 04/28/20  0937 04/28/20  1810 04/29/20  0624   WBC 7.8 12.6*  --  17.8*   HGB 12.9 10.0* 10.1* 8.8*    227  --  245     BMP:    Recent Labs     04/27/20  0946 04/28/20  0937 04/29/20  0624    136 137   K 4.7 4.5 5.7*    104 103   CO2 24 17* 20   BUN 17 21 33*   CREATININE 1.03* 1.60* 1.66*   GLUCOSE 112* 154* 112*     Hepatic:   Recent Labs     04/27/20  0946   AST 22   ALT 14   BILITOT 0.22*   ALKPHOS 82     Amylase: No results found for: AMYLASE  Lipase: No results found for: LIPASE  CARDIAC ENZYMES:No results for input(s): CKTOTAL, CKMB, CKMBINDEX, TROPONINI in the last 72 hours. BNP: No results for input(s): BNP in the last 72 hours. Lipids: No results for input(s): CHOL, HDL in the last 72 hours. Invalid input(s): LDLCALCU  ABGs: No results found for: PHART, PO2ART, OAO0AWP  INR:   Recent Labs     04/27/20  0946   INR 1.0     Thyroid:   Lab Results   Component Value Date    TSH 2.17 04/25/2019     Urinalysis:   Recent Labs     04/27/20  0950   BACTERIA MANY*   COLORU YELLOW   PHUR 7.5   PROTEINU 4+*   RBCUA TOO NUMEROUS TO COUNT   SPECGRAV 1.019   BILIRUBINUR NEGATIVE   NITRU NEGATIVE   WBCUA 10 TO 20   LEUKOCYTESUR SMALL*   GLUCOSEU NEGATIVE     Cultures:-  -----------------------------------------------------------------  CXR  Large left diaphragmatic hernia occupying two thirds of the left hemithorax/left elevated hemidiaphragm. CT Scans  CT scan abdomen  1. No ureteral calculi. 2. High attenuation material in the bladder which may be related to blood   products.  Cystoscopy may be of benefit for further evaluation. 3. 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 4. Colonic diverticulosis. 5. Atherosclerotic disease. 6. Small amount of free pelvic fluid of uncertain etiology. 7. Bilateral inguinal hernias containing fat.                Assessment and Plan     Although she has recent travel but her symptoms are related to sepsis/UTI and pyelonephritis with hematuria, she was reported to be hypoxic currently she is comfortable not in any distress saturating 97 to 98% on 3 L nasal cannula, suspicion for pulmonary volume is low no chest pain and no leg pain or swelling, as she has JOSIAH would avoid contrast and if she is stable hemodynamically after fluid resuscitation we will get a perfusion scan and bedside venous Dopplers of leg  Sepsis/SIRS secondary to UTI/acute pyelonephritis  Hypotension secondary to hypovolemia and sepsis. Lactic acidosis secondary to sepsis and hypovolemia. JOSIAH. Hyperkalemia. Leukocytosis. Hematuria secondary to cystitis  History of hypertension and hyperlipidemia. Coronary artery disease with history of a stent. Plan and recommendation:    As mentioned above VQ scan was she is more stable. Recommend fluid bolus now and start maintenance IV fluid as currently when I saw the patient patient was not in any IV fluids. His lactic acid is elevated recommend to follow-up lactic acid after fluid bolus  Venous Dopplers of legs. Blood culture, urinalysis and urine culture. Follow-up urine output, creatinine, renal function. Follow-up potassium. Nephrology has been consulted. Start DVT prophylaxis pending work-up if okay with urology. Continue with supplemental O2. Start meropenem and dose given as soon as possible. Follow-up with urology for hematuria and bladder irrigation. Recommend EKG  Discussed all above with medicine resident/team        It was my pleasure to evaluate Reta Lewis today. Please call with questions. Tenisha Gustafson MD             4/29/2020, 11:50 AM    Please note that this chart was generated using voice recognition Dragon dictation software. Although every effort was made to ensure the accuracy of this automated transcription, some errors in transcription may have occurred.

## 2020-04-29 NOTE — PLAN OF CARE
Problem: DAILY CARE  Goal: Daily care needs are met  Outcome: Ongoing     Problem: PAIN  Goal: Patient's pain/discomfort is manageable  Outcome: Ongoing     Problem: Pain:  Goal: Pain level will decrease  Description: Pain level will decrease  Outcome: Ongoing     Problem: Pain:  Goal: Control of acute pain  Description: Control of acute pain  Outcome: Ongoing     Problem: Falls - Risk of:  Goal: Will remain free from falls  Description: Will remain free from falls  Outcome: Ongoing     Problem: Falls - Risk of:  Goal: Absence of physical injury  Description: Absence of physical injury  Outcome: Ongoing

## 2020-04-29 NOTE — CONSULTS
Renal Consult Note    Patient :  Sandor Davison; 80 y.o. MRN# [id number]  Location:  2020/2020-01  Attending:  Elida Diaz MD  Admit Date:  4/28/2020   Hospital Day: 1    Reason for Consult:     Asked by Dr Elida Diaz MD to see for JOSIAH/Elevated Creatinine. History Obtained From:     patient, electronic medical record, patient's nurse    History of Present Illness:     Sandor Davison; 80 y.o. female with past medical history of hypertension, hyperlipidemia, hypothyroidism, coronary artery disease, UTI with E. coli ESBL in the past, history of urinary incontinence with prior bladder suspension surgery in 1970s presented due to hematuria. He initially went to [facility] due to her acute symptom of gross painless hematuria which initiated on 4/26/2020 night initiating with few drops and progressed to hailey hematuria by the morning hence she went to the hospital.  She did experience positive burning with urination. Patient also reported poor oral intake lately. Patient was transferred to [place] under urology service for further management. She has recurrent UTIs, last saw Dr Maxine Lucas with [facility] June of 2019. Follows for recurrent UTIs and incontinence, some being ESBL E. Coli. Urology has placed patient on CBI's and will likely require cystoscopy. Internal medicine was consulted for medical management. Patient also complained of positive shortness of breath, no fever noted but has been hypotensive with systolic in 03K to 34Z. She was initiated on some IV fluids. Patient  later also developed acute kidney injury with creatinine of 1.66 mg/DL today. She also had hyperkalemia with potassium of 5.7 initially treated with insulin dextrose repeat labs are pending. Her baseline creatinine seems to be normal. Infectious diseases was consulted and they initiated patient on meropenem and also patient had been placed in COVID-19 precautions and rule out testing been sent.   Patient does not report any previous renal issues, does not mention any NSAID usage either. Patient  does take lisinopril 10 mg daily at home and states that she was taking her medications before coming to the hospital.  She did receive Ringer's lactate yesterday. There is also concern for PE, VQ scan was ordered which showed low probability as CT scan with contrast could not be done due to acute kidney injury. Internal medicine consulted nephrology due to acute kidney injury and hyperkalemia. No history of recent contrast exposure, No h/o prolonged NSAIDs use in the past, No h/o nephrolithiasis, No recent skin rashes or arthralgias, No hematuria or pyuria noticed in the recent past. Doesn't report any reduction in the urine output recently. Non report of any obstructive urinary symptoms (urgency, frequency, weak stream, straining while urination). Positive h/o recurrent UTIs in the past.    Past History/Allergies? Social History:     Past Medical History:   Diagnosis Date    Coronary artery disease     ESBL (extended spectrum beta-lactamase) producing bacteria infection 6/30/2015    E.  Coli urine    HH (hiatus hernia)     Hyperlipidemia     Hypertension     Thyroid disease        Allergies   Allergen Reactions    Bactrim [Sulfamethoxazole-Trimethoprim] Hives    Sulfamethoxazole-Trimethoprim        Social History     Socioeconomic History    Marital status:      Spouse name: Not on file    Number of children: Not on file    Years of education: Not on file    Highest education level: Not on file   Occupational History    Not on file   Social Needs    Financial resource strain: Not on file    Food insecurity     Worry: Not on file     Inability: Not on file    Transportation needs     Medical: Not on file     Non-medical: Not on file   Tobacco Use    Smoking status: Never Smoker    Smokeless tobacco: Never Used   Substance and Sexual Activity    Alcohol use: No    Drug use: No    Sexual activity: 17 g Oral Daily    morphine  2 mg Intravenous Once     Continuous Infusions:    dextrose      sodium chloride 100 mL/hr at 20 1157    sodium chloride       PRN Meds:  albuterol, glucose, dextrose, glucagon (rDNA), dextrose, levalbuterol, sodium chloride flush, acetaminophen **OR** acetaminophen, ondansetron, opium-belladonna, morphine, HYDROcodone 5 mg - acetaminophen **OR** HYDROcodone 5 mg - acetaminophen    Review of Systems:     Constitutional: No fever, no chills, no lethargy, no weakness, positive poor oral intake. HEENT:  No headache, otalgia, itchy eyes, nasal discharge or sore throat. Cardiac:  No chest pain, positive shortness of breath, no orthopnea or PND. Chest:   No cough, phlegm or wheezing. Abdomen:  No abdominal pain, nausea or vomiting. Neuro:  No focal weakness, abnormal movements or seizure like activity. Skin:   No rashes, no itching. :   Positive hematuria, positive burning, no pyuria, no flank pain. Extremities:  No swelling or joint pains. ROS was otherwise negative except as mentioned in the [address] Input/Output:       I/O last 3 completed shifts: In: 7611 [P.O.:200; I.V.:844]  Out: 3000 [Urine:3000]    Vital Signs:   Temperature:  Temp: 98.4 °F (36.9 °C)  TMax:   Temp (24hrs), Av.1 °F (36.7 °C), Min:97.5 °F (36.4 °C), Max:98.7 °F (37.1 °C)    Respirations:  Resp: 27  Pulse:   Pulse: 97  BP:    BP: (!) 107/43  BP Range: Systolic (93TOQ), IVW:04 , Min:81 , XXZ:956       Diastolic (12CUH), BVP:49, Min:43, Max:62      Physical Examination:       I could not  physical examined the patient due to Covid 19 pandemic and to conserve the PPE's. But I did speak with the patient directly in detail about her condition and took the H&P from her via her room telephone.     Labs:       Recent Labs     20  0946 20  0937 20  1810 20  0624   WBC 7.8 12.6*  --  17.8*   RBC 4.17 3.22*  --  2.74*   HGB 12.9 10.0* 10.1* 8.8*   HCT 39.9 32.1* 34.2* 27.8*   MCV

## 2020-04-30 ENCOUNTER — APPOINTMENT (OUTPATIENT)
Dept: GENERAL RADIOLOGY | Age: 85
DRG: 871 | End: 2020-04-30
Attending: UROLOGY
Payer: MEDICARE

## 2020-04-30 ENCOUNTER — APPOINTMENT (OUTPATIENT)
Dept: ULTRASOUND IMAGING | Age: 85
DRG: 871 | End: 2020-04-30
Attending: UROLOGY
Payer: MEDICARE

## 2020-04-30 PROBLEM — D72.9 NEUTROPHILIC LEUKOCYTOSIS: Status: ACTIVE | Noted: 2020-04-29

## 2020-04-30 LAB
ANION GAP SERPL CALCULATED.3IONS-SCNC: 13 MMOL/L (ref 9–17)
ANTI-NUCLEAR ANTIBODY (ANA): NEGATIVE
BUN BLDV-MCNC: 34 MG/DL (ref 8–23)
BUN/CREAT BLD: ABNORMAL (ref 9–20)
CALCIUM SERPL-MCNC: 8.5 MG/DL (ref 8.6–10.4)
CHLORIDE BLD-SCNC: 103 MMOL/L (ref 98–107)
CO2: 20 MMOL/L (ref 20–31)
CREAT SERPL-MCNC: 1.23 MG/DL (ref 0.5–0.9)
ESTIMATED AVERAGE GLUCOSE: 117 MG/DL
GFR AFRICAN AMERICAN: 50 ML/MIN
GFR NON-AFRICAN AMERICAN: 42 ML/MIN
GFR SERPL CREATININE-BSD FRML MDRD: ABNORMAL ML/MIN/{1.73_M2}
GFR SERPL CREATININE-BSD FRML MDRD: ABNORMAL ML/MIN/{1.73_M2}
GLUCOSE BLD-MCNC: 124 MG/DL (ref 70–99)
GLUCOSE BLD-MCNC: 126 MG/DL (ref 65–105)
GLUCOSE BLD-MCNC: 130 MG/DL (ref 65–105)
HBA1C MFR BLD: 5.7 % (ref 4–6)
HCT VFR BLD CALC: 26.6 % (ref 36.3–47.1)
HEMOGLOBIN: 8.6 G/DL (ref 11.9–15.1)
MCH RBC QN AUTO: 31.9 PG (ref 25.2–33.5)
MCHC RBC AUTO-ENTMCNC: 32.3 G/DL (ref 28.4–34.8)
MCV RBC AUTO: 98.5 FL (ref 82.6–102.9)
NRBC AUTOMATED: 0 PER 100 WBC
PDW BLD-RTO: 14.2 % (ref 11.8–14.4)
PLATELET # BLD: 226 K/UL (ref 138–453)
PMV BLD AUTO: 9.2 FL (ref 8.1–13.5)
POTASSIUM SERPL-SCNC: 5.2 MMOL/L (ref 3.7–5.3)
RBC # BLD: 2.7 M/UL (ref 3.95–5.11)
SODIUM BLD-SCNC: 136 MMOL/L (ref 135–144)
WBC # BLD: 15.6 K/UL (ref 3.5–11.3)

## 2020-04-30 PROCEDURE — 6370000000 HC RX 637 (ALT 250 FOR IP): Performed by: STUDENT IN AN ORGANIZED HEALTH CARE EDUCATION/TRAINING PROGRAM

## 2020-04-30 PROCEDURE — 2580000003 HC RX 258: Performed by: STUDENT IN AN ORGANIZED HEALTH CARE EDUCATION/TRAINING PROGRAM

## 2020-04-30 PROCEDURE — 2580000003 HC RX 258: Performed by: INTERNAL MEDICINE

## 2020-04-30 PROCEDURE — 76775 US EXAM ABDO BACK WALL LIM: CPT

## 2020-04-30 PROCEDURE — 2580000003 HC RX 258: Performed by: PHYSICIAN ASSISTANT

## 2020-04-30 PROCEDURE — 85027 COMPLETE CBC AUTOMATED: CPT

## 2020-04-30 PROCEDURE — 82947 ASSAY GLUCOSE BLOOD QUANT: CPT

## 2020-04-30 PROCEDURE — 6360000002 HC RX W HCPCS: Performed by: STUDENT IN AN ORGANIZED HEALTH CARE EDUCATION/TRAINING PROGRAM

## 2020-04-30 PROCEDURE — 80048 BASIC METABOLIC PNL TOTAL CA: CPT

## 2020-04-30 PROCEDURE — 99222 1ST HOSP IP/OBS MODERATE 55: CPT | Performed by: INTERNAL MEDICINE

## 2020-04-30 PROCEDURE — 99233 SBSQ HOSP IP/OBS HIGH 50: CPT | Performed by: INTERNAL MEDICINE

## 2020-04-30 PROCEDURE — APPSS45 APP SPLIT SHARED TIME 31-45 MINUTES: Performed by: NURSE PRACTITIONER

## 2020-04-30 PROCEDURE — 2060000000 HC ICU INTERMEDIATE R&B

## 2020-04-30 PROCEDURE — 74022 RADEX COMPL AQT ABD SERIES: CPT

## 2020-04-30 RX ORDER — BISACODYL 10 MG
10 SUPPOSITORY, RECTAL RECTAL ONCE
Status: COMPLETED | OUTPATIENT
Start: 2020-04-30 | End: 2020-05-01

## 2020-04-30 RX ORDER — SODIUM CHLORIDE 9 MG/ML
INJECTION, SOLUTION INTRAVENOUS CONTINUOUS
Status: DISCONTINUED | OUTPATIENT
Start: 2020-04-30 | End: 2020-05-06 | Stop reason: HOSPADM

## 2020-04-30 RX ADMIN — ALBUTEROL SULFATE 1 PUFF: 90 AEROSOL, METERED RESPIRATORY (INHALATION) at 15:32

## 2020-04-30 RX ADMIN — POLYETHYLENE GLYCOL 3350 17 G: 17 POWDER, FOR SOLUTION ORAL at 09:56

## 2020-04-30 RX ADMIN — SODIUM CHLORIDE 3000 ML: 900 IRRIGANT IRRIGATION at 01:59

## 2020-04-30 RX ADMIN — HYDROCODONE BITARTRATE AND ACETAMINOPHEN 1 TABLET: 5; 325 TABLET ORAL at 18:18

## 2020-04-30 RX ADMIN — MEROPENEM 1 G: 1 INJECTION, POWDER, FOR SOLUTION INTRAVENOUS at 03:00

## 2020-04-30 RX ADMIN — ACETAMINOPHEN 650 MG: 325 TABLET ORAL at 15:08

## 2020-04-30 RX ADMIN — SODIUM CHLORIDE 3000 ML: 900 IRRIGANT IRRIGATION at 04:54

## 2020-04-30 RX ADMIN — LEVOTHYROXINE SODIUM 75 MCG: 75 TABLET ORAL at 09:56

## 2020-04-30 RX ADMIN — BUDESONIDE AND FORMOTEROL FUMARATE DIHYDRATE 2 PUFF: 80; 4.5 AEROSOL RESPIRATORY (INHALATION) at 09:56

## 2020-04-30 RX ADMIN — MEROPENEM 1 G: 1 INJECTION, POWDER, FOR SOLUTION INTRAVENOUS at 18:13

## 2020-04-30 RX ADMIN — LATANOPROST 1 DROP: 50 SOLUTION OPHTHALMIC at 02:09

## 2020-04-30 RX ADMIN — SODIUM CHLORIDE: 9 INJECTION, SOLUTION INTRAVENOUS at 18:13

## 2020-04-30 ASSESSMENT — PAIN DESCRIPTION - FREQUENCY
FREQUENCY: CONTINUOUS
FREQUENCY: CONTINUOUS

## 2020-04-30 ASSESSMENT — PAIN DESCRIPTION - DESCRIPTORS
DESCRIPTORS: ACHING
DESCRIPTORS: DISCOMFORT;ACHING

## 2020-04-30 ASSESSMENT — PAIN DESCRIPTION - ORIENTATION
ORIENTATION: RIGHT;LEFT
ORIENTATION: RIGHT;LEFT

## 2020-04-30 ASSESSMENT — PAIN DESCRIPTION - PAIN TYPE
TYPE: ACUTE PAIN
TYPE: ACUTE PAIN

## 2020-04-30 ASSESSMENT — PAIN SCALES - GENERAL
PAINLEVEL_OUTOF10: 8
PAINLEVEL_OUTOF10: 10
PAINLEVEL_OUTOF10: 7

## 2020-04-30 ASSESSMENT — PAIN DESCRIPTION - PROGRESSION
CLINICAL_PROGRESSION: NOT CHANGED
CLINICAL_PROGRESSION: NOT CHANGED

## 2020-04-30 ASSESSMENT — PAIN DESCRIPTION - ONSET
ONSET: ON-GOING
ONSET: ON-GOING

## 2020-04-30 ASSESSMENT — PAIN DESCRIPTION - LOCATION
LOCATION: BACK
LOCATION: BACK

## 2020-04-30 NOTE — PROGRESS NOTES
[facility] - Progress Note  Today's Date and Time: 4/30/2020, 8:51 AM    Impression :   · Hematuria  · UTI with E coli ESBL +  · Hx recurrent UTIs with prior E coli ESBL + infection  · Hx urinary incontinence with prior bladder suspension surgery in 1970's  · Hx CAD on anticoagulants    Recommendations:   · Meropenem 1 gm Iv q 8 hr  · ID wise OK to proceed with cystoscopy at the discretion of Urology  · Will test for COVID in anticipation of possible cystoscopy but degree of suspicion is low - COVID negative 4-29  · OK to D/C droplet isolation and transfer out of COVID unit    Medical Decision Making/Summary/Discussion:4/30/2020     · 79 yo woman with history of ESBL ecoli UTI  · Developed hailey hematuria with associated dysuria. · Admitted for further care, CBI in place, started on meropenem  · COVID testing is negative, Urine culture showing ESBL Ecoli   Infection Control Recommendations   · [place] Precautions  · Contact Isolation ESBL +    Antimicrobial Stewardship Recommendations     · Simplification of therapy  · Targeted therapy    Coordination of Outpatient Care:   · Estimated Length of IV antimicrobials:TBD Start 4-29  · Patient will need Midline Catheter Insertion: No  · Patient will need PICC line Insertion:No  · Patient will need: Home IV , [place],  SNF,  LTAC:TBD  · Patient will need outpatient wound care:No    Chief complaint/reason for consultation:   E coli ESBL + UTI    History of Present Illness:   Orlando Skinner is a 80y.o.-year-old  female who was initially admitted on 4/28/2020. Patient seen at the request of Russ Sam. INITIAL HISTORY:    Patient transferred from [facility] where she had presented with gross hematuria on 4-27-20. She has a Hx of prior urinary incontinence after pregnancies. Had undergone bladder suspension surgery in the 1970's. She reports Hx of recurrent UTI's including infections with E coli ESBL +.   On the current admission she developed hematuria on 12-26-20 which progressed from a few drops into hailey hematuria by the next morning. She had experienced symptoms of burning with urination prior to the hematuria suggestive of UTI. She is on Plavix and ASA because of CAD. Urine culture has grown E coli ESBL +. The patient is receiving meropenem. She is currently receiving bladder irrigations to control the bleeding. A cystoscopy may be necessary. She gives no Hx to suggest COVID. Her CXR shows a large Lt hiatal hernia and evidence of COPD. No infiltrates to suggest pneumonia. Abd CT 4-29-20: Hiatal hernia and vascular congestion. CURRENT EXAMINATION: 4/30/2020     Pt AAO  Denies chills, fevers, SOB  Seen on 3L O2 NC  States that she has never required oxygen,  Is feeling slightly weak today    Pt Hgb has dropped 4gm since admit 4 days ago  COVID testing is negative, O2 requirement and weakness is likely related to blood loss anemia    Hope in place, CBI has been turned off. Hematuria continues, no clots noted    Per urology notes, plan for outpatient cystoscopy. Afebrile  Mild tachycardia, VS otherwise stable    Labs, X rays reviewed: 4/30/2020    BUN: 33->34  Cr:1.66->1.23    WBC:17.8->15.6  Hb:12.9->10.0->8.8->8.6  Plat: 245->226    Ferritin 141  ESR 91    4-29 COVID negative     Cultures:  Urine:  · 4-27-20 E coli ESBL +  Blood:  · 4-29 x 2 no growth to date  Sputum :  ·   Wound:  ·     Discussed with patient, RN. I have personally reviewed the past medical history, past surgical history, medications, social history, and family history, and I have updated the database accordingly. Past Medical History:     Past Medical History:   Diagnosis Date    Coronary artery disease     ESBL (extended spectrum beta-lactamase) producing bacteria infection 6/30/2015    E.  Coli urine    HH (hiatus hernia)     Hyperlipidemia     Hypertension     Thyroid disease        Past Surgical  History:     Past Age of Onset    Diabetes Brother     Stroke Father     Coronary Art Dis Other         siblings X3        Allergies:   Bactrim [sulfamethoxazole-trimethoprim] and Sulfamethoxazole-trimethoprim     Review of Systems:   Constitutional: No fevers or chills. No systemic complaints  Head: No headaches  Eyes: No double vision or blurry vision. No conjunctival inflammation. ENT: No sore throat or runny nose. . No hearing loss, tinnitus or vertigo. Cardiovascular: No chest pain or palpitations. No shortness of breath. No CHOE  Lung: No shortness of breath or cough. No sputum production  Abdomen: No nausea, vomiting, diarrhea, or abdominal pain. Mando Sanchez No cramps. Genitourinary: No increased urinary frequency, or dysuria. Has hematuria. No suprapubic or CVA pain  Musculoskeletal: No muscle aches or pains. No joint effusions, swelling or deformities  Hematologic: No bleeding or bruising. Neurologic: No headache, weakness, numbness, or tingling. Integument: No rash, no ulcers. Psychiatric: No depression. Endocrine: No polyuria, no polydipsia, no polyphagia. Physical Examination :     Patient Vitals for the past 8 hrs:   BP Temp Temp src Pulse Resp SpO2 Height Weight   04/30/20 0711 -- -- -- 101 22 98 % -- --   04/30/20 0600 134/73 -- -- 99 18 97 % -- --   04/30/20 0412 -- -- -- 98 -- -- -- --   04/30/20 0400 119/71 -- -- 99 21 97 % -- --   04/30/20 0200 (!) 128/59 98.1 °F (36.7 °C) Oral 102 26 98 % 4' 11\" (1.499 m) 193 lb 8 oz (87.8 kg)     General Appearance: Awake, alert, and in no apparent distress. Heavy set  Head:  Normocephalic, no trauma. On nasal 02  Eyes: Pupils equal, round, reactive to light and accommodation; extraocular movements intact; sclera anicteric; conjunctivae pink. No embolic phenomena. ENT: Oropharynx clear, without erythema, exudate, or thrush. No tenderness of sinuses. Mouth/throat: mucosa pink and moist. No lesions. Neck:Supple, without lymphadenopathy.  [place] [id number] [facility] [id number] 08/30/17 0801-Present   [facility] [address] Estefany Mckeon MD [address] [facility] [address] 11/17/17 1521-Present   Susceptibility     Escherichia coli (1)     Antibiotic Interpretation CORRINA Status    amikacin   Final     NOT REPORTED   ampicillin Resistant  Final     >=32  RESISTANT   ampicillin-sulbactam   Final     NOT REPORTED   aztreonam Resistant  Final     <=1  RESISTANT   ceFAZolin Resistant  Final     >=64  RESISTANT   ceFAZolin Resistant Cefazolin sensitivity results can be used to predict the effectiveness of oral cephalosporins (eg. Cephalexin) in uncomplicated Urinary Tract Infections due to E. coli, K. pneumoniae, and P. mirabilis Final    cefepime Resistant  Final     <=1  RESISTANT   cefTRIAXone Resistant  Final     >=64  RESISTANT   ciprofloxacin Resistant  Final     >=4  RESISTANT   ertapenem   Final     NOT REPORTED   Confirmatory Extended Spectrum Beta-Lactamase Positive POSITIVE Final    gentamicin Sensitive  Final     <=1  SUSCEPTIBLE   meropenem Sensitive  Final     <=0.25  SUSCEPTIBLE   nitrofurantoin Sensitive  Final     <=16  SUSCEPTIBLE   tigecycline   Final     NOT REPORTED   tobramycin Sensitive  Final     <=1  SUSCEPTIBLE   trimethoprim-sulfamethoxazole Resistant  Final     >=320  RESISTANT   piperacillin-tazobactam Resistant  Final     <=4  RESISTANT       Medical Decision Making-Other:     Note:  · Labs, medications, radiologic studies were reviewed with personal review of films  · Moderate Large amounts of data were reviewed  · Discussed with nursing Staff, Discharge planner  · Infection Control and Prevention measures reviewed  · All prior entries were reviewed  · Administer medications as ordered  · Prognosis: Good  · Discharge planning reviewed  · Follow up as outpatient. Thank you for allowing us to participate in the care of this patient. Please call with questions.     Prabhjot Mchugh MD  Pager: [phone] - Office: [phone]

## 2020-04-30 NOTE — PROGRESS NOTES
Urology at bedside. Stopped CBI. Instructed writer that if urine becomes bloody to restart CBI and contact urology. Writer passed on to day shift nurse, Kathia Hernandez.

## 2020-04-30 NOTE — CONSULTS
Nephrology progress note         Brief summary:   this is a 80 y.o. female who presented to [facility] with urinary frequency urgency and hematuria. Was also having a low-grade fever. Patient states that 3 or 4 days prior to this hospitalization she was not feeling well. Her oral intake was marginal.  And because of hematuria and worsening of symptoms she presented to ER. Patient needs cystoscopy. Patient was transferred to [place].  Due to fever and on the recommendation of infectious diseases she was initially admitted to [facility] hernandez. We have just received her call with results and that is negative. Nephrology was consulted because of elevation in creatinine. Patient was also hypotensive at presentation. At the time of initial hospitalization her creatinine was 1.6 mg/dL. Her baseline creatinine is somewhere around 0.7-0.8 mg/dL. He was started on IV fluid and today her creatinine is down to 1.23. Patient has a history of recurrent urinary tract infection. Patient states that she usually gets at least 1 serious urinary tract infection. Patient also has a history of bladder surgery. And she is under the care of urology. He was treated with meropenem for urinary tract infection. Denies any consumption of prolonged NSAID use. There is no history of jaundice or hepatitis. There is no history of collagen vascular disease.   .    Current Medications:    glucose (GLUTOSE) 40 % oral gel 15 g, PRN  dextrose 50 % IV solution, PRN  glucagon (rDNA) injection 1 mg, PRN  dextrose 5 % solution, PRN  budesonide-formoterol (SYMBICORT) 80-4.5 MCG/ACT inhaler 2 puff, BID  meropenem (MERREM) 1 g in sodium chloride 0.9 % 100 mL IVPB (mini-bag), Q12H  sodium bicarbonate 75 mEq in dextrose 5 % and 0.45 % NaCl 1,000 mL infusion, Continuous  albuterol sulfate  (90 Base) MCG/ACT inhaler 1 puff, Q4H PRN  latanoprost (XALATAN) 0.005 % ophthalmic solution 1 drop, file    Intimate partner violence     Fear of current or ex partner: Not on file     Emotionally abused: Not on file     Physically abused: Not on file     Forced sexual activity: Not on file   Other Topics Concern    Not on file   Social History Narrative    Not on file         Review of Systems:    Constitutional: Is feeling tired. HEENT:  Headache or blurring of vision. Cardiac:  NO Chest pain or PND  Chest:   No cough, phlegm or wheezing. Abdomen:  No abdominal pain, nausea or vomiting. Neuro:  No focal weakness, abnormal movements orseizure like activity. Skin:   No rashes, no itching. :   No hematuria, no pyuria, no dysuria, no flank pain. Extremities:  No swelling. Objective:  CURRENT TEMPERATURE:  Temp: 98.1 °F (36.7 °C)  MAXIMUM TEMPERATURE OVER 24HRS:  Temp (24hrs), Av.5 °F (36.9 °C), Min:98.1 °F (36.7 °C), Max:99 °F (37.2 °C)    CURRENT RESPIRATORY RATE:  Resp: 20  CURRENT PULSE:  Pulse: 103  CURRENT BLOOD PRESSURE:  BP: 112/68  24HR BLOOD PRESSURE RANGE:  Systolic (30JUS), ELLA:404 , Min:107 , TOM:816   ; Diastolic (29XOB), TCF:18, Min:43, Max:73    24HR INTAKE/OUTPUT:      Intake/Output Summary (Last 24 hours) at 2020 1454  Last data filed at 2020 0718  Gross per 24 hour   Intake 2744 ml   Output -2330 ml   Net 5074 ml     Patient Vitals for the past 96 hrs (Last 3 readings):   Weight   20 0200 193 lb 8 oz (87.8 kg)     Physical Exam:  General appearance: Was alert and awake. Skin: Warm to touch  Neck: No carotid bruit or thyromegaly  Pulmonary: No wheezing.   Cardiovascular: S1 and S2 audible no S3  Abdomen: Soft and nontender bowel sounds are positive  Extremities: Trace edema    Labs:   CBC:  Recent Labs     20  0937 20  1810 20  0624 20  0748   WBC 12.6*  --  17.8* 15.6*   RBC 3.22*  --  2.74* 2.70*   HGB 10.0* 10.1* 8.8* 8.6*   HCT 32.1* 34.2* 27.8* 26.6*   MCV 99.7  --  101.5 98.5   MCH 31.1  --  32.1 31.9   MCHC 31.2  --  31.7 32.3   RDW 13. 5  --  14.1 14.2     --  245 226   MPV 8.8  --  9.6 9.2      BMP:   Recent Labs     04/28/20  0937 04/29/20  0624 04/29/20  1738 04/30/20  0748    137  --  136   K 4.5 5.7* 5.3 5.2    103  --  103   CO2 17* 20  --  20   BUN 21 33*  --  34*   CREATININE 1.60* 1.66*  --  1.23*   GLUCOSE 154* 112*  --  124*   CALCIUM 7.8* 8.4*  --  8.5*   IRON:    Lab Results   Component Value Date    IRON 79 04/25/2019     Iron Saturation:  No components found for: PERCENTFE  TIBC:  No results found for: TIBC  FERRITIN:    Lab Results   Component Value Date    FERRITIN 141 04/29/2020     SPEP:   Lab Results   Component Value Date    PROT 5.4 04/29/2020    ALBCAL PENDING 04/29/2020    ALBPCT PENDING 04/29/2020    LABALPH PENDING 04/29/2020    LABALPH PENDING 04/29/2020    A1PCT PENDING 04/29/2020    A2PCT PENDING 04/29/2020    LABBETA PENDING 04/29/2020    BETAPCT PENDING 04/29/2020    GAMGLOB PENDING 04/29/2020    GGPCT PENDING 04/29/2020    PATH PENDING 04/29/2020     UPEP: No results found for: TPU     C3:   Lab Results   Component Value Date    C3 106 04/29/2020     C4:   Lab Results   Component Value Date    C4 40 04/29/2020   Urine Sodium:    Lab Results   Component Value Date    RITO 100 04/29/2020      Urine Potassium:    Lab Results   Component Value Date    KUR 34.9 04/29/2020   Urine Creatinine:    Lab Results   Component Value Date    LABCREA 126.0 04/29/2020   Urinalysis:  U/A:   Lab Results   Component Value Date    NITRU POSITIVE 04/29/2020    COLORU RED 04/29/2020    PHUR 5.0 04/29/2020    WBCUA TOO NUMEROUS TO COUNT 04/29/2020    RBCUA TOO NUMEROUS TO COUNT 04/29/2020    MUCUS NOT REPORTED 04/29/2020    TRICHOMONAS NOT REPORTED 04/29/2020    YEAST NOT REPORTED 04/29/2020    BACTERIA NOT REPORTED 04/29/2020    CLARITYU cloudy 09/09/2015    SPECGRAV 1.012 04/29/2020    LEUKOCYTESUR LARGE 04/29/2020    UROBILINOGEN Normal 04/29/2020    BILIRUBINUR NEGATIVE  Verified by ictotest. 04/29/2020

## 2020-04-30 NOTE — PLAN OF CARE
Problem: DAILY CARE  Goal: Daily care needs are met  4/30/2020 0408 by Arielle Sun RN  Outcome: Ongoing     Problem: PAIN  Goal: Patient's pain/discomfort is manageable  4/30/2020 0408 by Arielle Sun RN  Outcome: Ongoing    Problem: Pain:  Goal: Pain level will decrease  Description: Pain level will decrease  4/30/2020 0408 by Arielle Sun RN  Outcome: Ongoing     Problem: Pain:  Goal: Control of acute pain  Description: Control of acute pain  4/30/2020 0408 by Arielle Sun RN  Outcome: Ongoing     Problem: Pain:  Goal: Control of chronic pain  Description: Control of chronic pain  4/30/2020 0408 by Arielle Sun RN  Outcome: Ongoing     Problem: Falls - Risk of:  Goal: Will remain free from falls  Description: Will remain free from falls  4/30/2020 0408 by Arielle Sun RN  Outcome: Ongoing    Problem: Falls - Risk of:  Goal: Absence of physical injury  Description: Absence of physical injury  4/30/2020 0408 by Arielle Sun RN  Outcome: Ongoing

## 2020-04-30 NOTE — H&P
Collection Time: 04/29/20 10:43 AM   Result Value Ref Range    Cortisol 22.3 (H) 2.7 - 18.4 ug/dL    Cortisol Collection Info NOT REPORTED    Ferritin    Collection Time: 04/29/20 10:43 AM   Result Value Ref Range    Ferritin 141 13 - 150 ug/L   C3 Complement    Collection Time: 04/29/20 10:43 AM   Result Value Ref Range    Complement C3 109 90 - 180 mg/dL   C4 Complement    Collection Time: 04/29/20 10:43 AM   Result Value Ref Range    Complement C4 39 10 - 40 mg/dL   TSH with Reflex    Collection Time: 04/29/20 10:43 AM   Result Value Ref Range    TSH 1.93 0.30 - 5.00 mIU/L   Lactate, Sepsis    Collection Time: 04/29/20 10:44 AM   Result Value Ref Range    Lactic Acid, Sepsis NOT REPORTED 0.5 - 1.9 mmol/L    Lactic Acid, Sepsis, Whole Blood 5.0 (H) 0.5 - 1.9 mmol/L   PROTEIN, URINE, RANDOM    Collection Time: 04/29/20 10:45 AM   Result Value Ref Range    Total Protein, Urine 72 mg/dL   POC Glucose Fingerstick    Collection Time: 04/29/20 10:51 AM   Result Value Ref Range    POC Glucose 87 65 - 105 mg/dL   URINALYSIS WITH MICROSCOPIC    Collection Time: 04/29/20 12:35 PM   Result Value Ref Range    Color, UA RED (A) YELLOW    Turbidity UA TURBID (A) CLEAR    Glucose, Ur TRACE (A) NEGATIVE    Bilirubin Urine NEGATIVE  Verified by ictotest. (A) NEGATIVE    Ketones, Urine NEGATIVE NEGATIVE    Specific Gravity, UA 1.012 1.005 - 1.030    Urine Hgb MODERATE (A) NEGATIVE    pH, UA 5.0 5.0 - 8.0    Protein, UA 2+ (A) NEGATIVE    Urobilinogen, Urine Normal Normal    Nitrite, Urine POSITIVE (A) NEGATIVE    Leukocyte Esterase, Urine LARGE (A) NEGATIVE    -          WBC, UA TOO NUMEROUS TO COUNT 0 - 5 /HPF    RBC, UA TOO NUMEROUS TO COUNT 0 - 4 /HPF    Casts UA  0 - 8 /LPF     5 TO 10 HYALINE Reference range defined for non-centrifuged specimen.     Crystals, UA NOT REPORTED None /HPF    Epithelial Cells UA None 0 - 5 /HPF    Renal Epithelial, UA NOT REPORTED 0 /HPF    Bacteria, UA NOT REPORTED None    Mucus, UA NOT REPORTED

## 2020-04-30 NOTE — PROGRESS NOTES
5.3 5.2    103  --  103   CO2 17* 20  --  20   BUN 21 33*  --  34*   CREATININE 1.60* 1.66*  --  1.23*   GLUCOSE 154* 112*  --  124*     Calcium:  Recent Labs     04/30/20  0748   CALCIUM 8.5*     Ionized Calcium:No results for input(s): IONCA in the last 72 hours. Magnesium:No results for input(s): MG in the last 72 hours. Phosphorus:No results for input(s): PHOS in the last 72 hours. BNP:No results for input(s): BNP in the last 72 hours. Glucose:  Recent Labs     04/29/20  1051 04/30/20  0217 04/30/20  1005   POCGLU 87 130* 126*     HgbA1C:   Recent Labs     04/29/20  1043   LABA1C 5.7     INR: No results for input(s): INR in the last 72 hours. Hepatic:   Recent Labs     04/29/20  2101   PROT 5.4*     Amylase and Lipase:No results for input(s): LACTA, AMYLASE in the last 72 hours. Lactic Acid: No results for input(s): LACTA in the last 72 hours. CARDIAC ENZYMES:No results for input(s): CKTOTAL, CKMB, CKMBINDEX, TROPONINI in the last 72 hours. BNP: No results for input(s): BNP in the last 72 hours. Lipids: No results for input(s): CHOL, TRIG, HDL, LDLCALC in the last 72 hours. Invalid input(s): LDL  ABGs: No results found for: PH, PCO2, PO2, HCO3, O2SAT  Thyroid:   Lab Results   Component Value Date    TSH 1.93 04/29/2020      Urinalysis:   Recent Labs     04/29/20  1235   BACTERIA NOT REPORTED   COLORU RED*   PHUR 5.0   PROTEINU 2+*   RBCUA TOO NUMEROUS TO COUNT   SPECGRAV 1.012   BILIRUBINUR NEGATIVE  Verified by ictotest.*   112 [facility] TRACE*       CULTURES:    CXR 04/29/2020  Large left diaphragmatic hernia occupying two thirds of the left hemithorax/left elevated hemidiaphragm.     CT Scans  CT scan abdomen  1. No ureteral calculi. 2. High attenuation material in the bladder which may be related to blood   products.  Cystoscopy may be of benefit for further evaluation.    3. 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 4. Colonic diverticulosis. 5. Atherosclerotic disease. 6. Small amount of free pelvic fluid of uncertain etiology. 7. Bilateral inguinal hernias containing fat.      .  ECHO: Pending    V/Q scan. 04/29/2020. Low Probability for Pulmonary Embolus. Venous Doppler lower extremities 04/29/2020   No evidence of superficial or deep venous thrombosis in both lower    extremities. Assessment and Plan:    Low suspicion for pulmonary embolism/DVT, VQ scan and venous Dopplers were negative  UTI/acute pyonephritis with ESBL E. coli  Sepsis/SIRS secondary to UTI/acute pyelonephritis  Hypotension secondary to hypovolemia and sepsis improved with fluid resuscitation. Lactic acidosis secondary to sepsis and hypovolemia improved with fluid. Acute kidney injury improving  Hyperkalemia. Leukocytosis. Hematuria secondary to cystitis  History of hypertension and hyperlipidemia. Coronary artery disease with history of a stent.     Plan and recommendation:     Recommend to continue IV fluids. Suggest to discontinue bicarbonate drip. Follow blood culture results  Follow-up urine output, creatinine, renal function. Follow-up potassium. Recommend to start DVT prophylaxis. Continue with supplemental O2. Continue meropenem  Follow-up with urology for hematuria and bladder irrigation. Okay to transfer to stepdown unit. Discussed with nursing staff       Ann Reed MD       4/30/2020, 2:45 PM    Pulmonary & Critical Care    Please note that this chart was generated using voice recognition Dragon dictation software. Although every effort was made to ensure the accuracy of this automated transcription, some errors in transcription may have occurred.

## 2020-04-30 NOTE — PROGRESS NOTES
diarrhea, nausea, vomiting  Neurological:  negative for dizziness, headache    BRIEF HISTORY     The patient is a pleasant 80 y.o. female presents with a chief complaint of hematuria and dysuria and was found to have hematuria and UTI.     Patient was admitted under Urology service and underwent bladder irrigation. Due to JOSIAH and hyperkalemia, patient's care was transferred to medicine service.        Last Echo  2015 Left ventricle is normal in size. Global left ventricular systolic function  is normal. Estimated ejection fraction is 55-60% . Mild left ventricular hypertrophy. Mild aortic insufficiency. Mild mitral regurgitation. Mild-moderate tricuspid regurgitation. Mild pulmonary hypertension.     Last Cath  In 2013     Last lipid profile  4/25/19- lipid panel within normal limits     Last TSH 4/25/19- 2.7     Last HbA1C pending     On my evaluation,  Afebrile  Hypotensive  Heart rate in 90s  On 3 lpm of oxygen via nasal cannula and saturating well. Potassium 5. BUN 33  Creat 1.66  Lactic acid 5.0    ESR 91  Pro BNP 1678  Cortisol 22.3  WBC 17.8  Hemoglobin 8.8  Urine culture- ESBL E.Coli  C3 109  C4 39  UA- red color, turbid, positive for nitrites and leucocyte esterase  Urine protein 72  Urine creatinine 61.2  Urine sodium 100     CT Abdomen   1. No ureteral calculi. 2. High attenuation material in the bladder which may be related to blood   products.  Cystoscopy may be of benefit for further evaluation. 3. 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 4. Colonic diverticulosis. 5. Atherosclerotic disease. 6. Small amount of free pelvic fluid of uncertain etiology.    7. Bilateral inguinal hernias containing fat.      CXR-   The patient's stomach overlies the left lower 2/3 of the chest, which has   increased from the previous known hiatal hernia seen in 2013.  This could be   related to worsening of the patient's hiatal hernia,

## 2020-05-01 LAB
ABSOLUTE EOS #: 0.04 K/UL (ref 0–0.44)
ABSOLUTE IMMATURE GRANULOCYTE: 0.18 K/UL (ref 0–0.3)
ABSOLUTE LYMPH #: 1.2 K/UL (ref 1.1–3.7)
ABSOLUTE MONO #: 1.1 K/UL (ref 0.1–1.2)
ALBUMIN (CALCULATED): 2.9 G/DL (ref 3.2–5.2)
ALBUMIN PERCENT: 53 % (ref 45–65)
ALPHA 1 PERCENT: 7 % (ref 3–6)
ALPHA 2 PERCENT: 16 % (ref 6–13)
ALPHA-1-GLOBULIN: 0.4 G/DL (ref 0.1–0.4)
ALPHA-2-GLOBULIN: 0.9 G/DL (ref 0.5–0.9)
ANION GAP SERPL CALCULATED.3IONS-SCNC: 15 MMOL/L (ref 9–17)
BASOPHILS # BLD: 0 % (ref 0–2)
BASOPHILS ABSOLUTE: <0.03 K/UL (ref 0–0.2)
BETA GLOBULIN: 0.7 G/DL (ref 0.5–1.1)
BETA PERCENT: 13 % (ref 11–19)
BUN BLDV-MCNC: 31 MG/DL (ref 8–23)
BUN/CREAT BLD: ABNORMAL (ref 9–20)
CALCIUM SERPL-MCNC: 8.3 MG/DL (ref 8.6–10.4)
CHLORIDE BLD-SCNC: 101 MMOL/L (ref 98–107)
CO2: 22 MMOL/L (ref 20–31)
CREAT SERPL-MCNC: 1.01 MG/DL (ref 0.5–0.9)
DIFFERENTIAL TYPE: ABNORMAL
EOSINOPHILS RELATIVE PERCENT: 0 % (ref 1–4)
GAMMA GLOBULIN %: 11 % (ref 9–20)
GAMMA GLOBULIN: 0.6 G/DL (ref 0.5–1.5)
GFR AFRICAN AMERICAN: >60 ML/MIN
GFR NON-AFRICAN AMERICAN: 52 ML/MIN
GFR SERPL CREATININE-BSD FRML MDRD: ABNORMAL ML/MIN/{1.73_M2}
GFR SERPL CREATININE-BSD FRML MDRD: ABNORMAL ML/MIN/{1.73_M2}
GLUCOSE BLD-MCNC: 105 MG/DL (ref 70–99)
GLUCOSE BLD-MCNC: 263 MG/DL (ref 65–105)
GLUCOSE BLD-MCNC: 95 MG/DL (ref 65–105)
HCT VFR BLD CALC: 30.1 % (ref 36.3–47.1)
HEMOGLOBIN: 9.3 G/DL (ref 11.9–15.1)
IMMATURE GRANULOCYTES: 1 %
LV EF: 55 %
LVEF MODALITY: NORMAL
LYMPHOCYTES # BLD: 8 % (ref 24–43)
MCH RBC QN AUTO: 31.6 PG (ref 25.2–33.5)
MCHC RBC AUTO-ENTMCNC: 30.9 G/DL (ref 28.4–34.8)
MCV RBC AUTO: 102.4 FL (ref 82.6–102.9)
MONOCYTES # BLD: 7 % (ref 3–12)
NRBC AUTOMATED: 0 PER 100 WBC
PATHOLOGIST: ABNORMAL
PATHOLOGIST: NORMAL
PDW BLD-RTO: 13.9 % (ref 11.8–14.4)
PLATELET # BLD: 231 K/UL (ref 138–453)
PLATELET ESTIMATE: ABNORMAL
PMV BLD AUTO: 8.9 FL (ref 8.1–13.5)
POTASSIUM SERPL-SCNC: 4.9 MMOL/L (ref 3.7–5.3)
PROTEIN ELECTROPHORESIS, SERUM: ABNORMAL
RBC # BLD: 2.94 M/UL (ref 3.95–5.11)
RBC # BLD: ABNORMAL 10*6/UL
SEG NEUTROPHILS: 84 % (ref 36–65)
SEGMENTED NEUTROPHILS ABSOLUTE COUNT: 12.69 K/UL (ref 1.5–8.1)
SERUM IFX INTERP: NORMAL
SODIUM BLD-SCNC: 138 MMOL/L (ref 135–144)
TOTAL PROT. SUM,%: 100 % (ref 98–102)
TOTAL PROT. SUM: 5.5 G/DL (ref 6.3–8.2)
TOTAL PROTEIN: 5.4 G/DL (ref 6.4–8.3)
WBC # BLD: 15.2 K/UL (ref 3.5–11.3)
WBC # BLD: ABNORMAL 10*3/UL

## 2020-05-01 PROCEDURE — 99232 SBSQ HOSP IP/OBS MODERATE 35: CPT | Performed by: INTERNAL MEDICINE

## 2020-05-01 PROCEDURE — 6370000000 HC RX 637 (ALT 250 FOR IP): Performed by: STUDENT IN AN ORGANIZED HEALTH CARE EDUCATION/TRAINING PROGRAM

## 2020-05-01 PROCEDURE — 93306 TTE W/DOPPLER COMPLETE: CPT

## 2020-05-01 PROCEDURE — 36415 COLL VENOUS BLD VENIPUNCTURE: CPT

## 2020-05-01 PROCEDURE — 94640 AIRWAY INHALATION TREATMENT: CPT

## 2020-05-01 PROCEDURE — 85025 COMPLETE CBC W/AUTO DIFF WBC: CPT

## 2020-05-01 PROCEDURE — 2580000003 HC RX 258: Performed by: STUDENT IN AN ORGANIZED HEALTH CARE EDUCATION/TRAINING PROGRAM

## 2020-05-01 PROCEDURE — 2060000000 HC ICU INTERMEDIATE R&B

## 2020-05-01 PROCEDURE — 99222 1ST HOSP IP/OBS MODERATE 55: CPT | Performed by: SURGERY

## 2020-05-01 PROCEDURE — 80048 BASIC METABOLIC PNL TOTAL CA: CPT

## 2020-05-01 PROCEDURE — 6360000002 HC RX W HCPCS: Performed by: STUDENT IN AN ORGANIZED HEALTH CARE EDUCATION/TRAINING PROGRAM

## 2020-05-01 PROCEDURE — 82947 ASSAY GLUCOSE BLOOD QUANT: CPT

## 2020-05-01 RX ORDER — DOCUSATE SODIUM 100 MG/1
100 CAPSULE, LIQUID FILLED ORAL DAILY
Status: DISCONTINUED | OUTPATIENT
Start: 2020-05-01 | End: 2020-05-04

## 2020-05-01 RX ORDER — BISACODYL 10 MG
10 SUPPOSITORY, RECTAL RECTAL ONCE
Status: COMPLETED | OUTPATIENT
Start: 2020-05-01 | End: 2020-05-01

## 2020-05-01 RX ORDER — SENNA PLUS 8.6 MG/1
1 TABLET ORAL NIGHTLY
Status: DISCONTINUED | OUTPATIENT
Start: 2020-05-01 | End: 2020-05-06 | Stop reason: HOSPADM

## 2020-05-01 RX ORDER — ALBUTEROL SULFATE 2.5 MG/3ML
2.5 SOLUTION RESPIRATORY (INHALATION)
Status: DISCONTINUED | OUTPATIENT
Start: 2020-05-01 | End: 2020-05-06

## 2020-05-01 RX ADMIN — DOCUSATE SODIUM 100 MG: 100 CAPSULE, LIQUID FILLED ORAL at 12:28

## 2020-05-01 RX ADMIN — BUDESONIDE AND FORMOTEROL FUMARATE DIHYDRATE 2 PUFF: 80; 4.5 AEROSOL RESPIRATORY (INHALATION) at 21:24

## 2020-05-01 RX ADMIN — MEROPENEM 1 G: 1 INJECTION, POWDER, FOR SOLUTION INTRAVENOUS at 12:28

## 2020-05-01 RX ADMIN — ATROPA BELLADONNA AND OPIUM 30 MG: 16.2; 3 SUPPOSITORY RECTAL at 13:20

## 2020-05-01 RX ADMIN — MORPHINE SULFATE 2 MG: 2 INJECTION, SOLUTION INTRAMUSCULAR; INTRAVENOUS at 07:44

## 2020-05-01 RX ADMIN — ACETAMINOPHEN 650 MG: 325 TABLET ORAL at 04:48

## 2020-05-01 RX ADMIN — PRAVASTATIN SODIUM 80 MG: 20 TABLET ORAL at 20:02

## 2020-05-01 RX ADMIN — MEROPENEM 1 G: 1 INJECTION, POWDER, FOR SOLUTION INTRAVENOUS at 05:02

## 2020-05-01 RX ADMIN — BISACODYL 10 MG: 10 SUPPOSITORY RECTAL at 22:28

## 2020-05-01 RX ADMIN — LEVOTHYROXINE SODIUM 75 MCG: 75 TABLET ORAL at 08:06

## 2020-05-01 RX ADMIN — LATANOPROST 1 DROP: 50 SOLUTION OPHTHALMIC at 20:55

## 2020-05-01 RX ADMIN — BISACODYL 10 MG: 10 SUPPOSITORY RECTAL at 01:14

## 2020-05-01 RX ADMIN — POLYETHYLENE GLYCOL 3350 17 G: 17 POWDER, FOR SOLUTION ORAL at 08:06

## 2020-05-01 RX ADMIN — ENOXAPARIN SODIUM 40 MG: 40 INJECTION, SOLUTION INTRAVENOUS; SUBCUTANEOUS at 12:28

## 2020-05-01 RX ADMIN — MEROPENEM 1 G: 1 INJECTION, POWDER, FOR SOLUTION INTRAVENOUS at 22:28

## 2020-05-01 RX ADMIN — SENNOSIDES 8.6 MG: 8.6 TABLET, FILM COATED ORAL at 20:02

## 2020-05-01 RX ADMIN — BUDESONIDE AND FORMOTEROL FUMARATE DIHYDRATE 2 PUFF: 80; 4.5 AEROSOL RESPIRATORY (INHALATION) at 08:22

## 2020-05-01 ASSESSMENT — PAIN DESCRIPTION - PAIN TYPE: TYPE: ACUTE PAIN

## 2020-05-01 ASSESSMENT — PAIN SCALES - GENERAL
PAINLEVEL_OUTOF10: 0
PAINLEVEL_OUTOF10: 10
PAINLEVEL_OUTOF10: 0
PAINLEVEL_OUTOF10: 6
PAINLEVEL_OUTOF10: 8

## 2020-05-01 ASSESSMENT — PAIN DESCRIPTION - LOCATION: LOCATION: BACK

## 2020-05-01 NOTE — PROGRESS NOTES
PULMONARY PROGRESS NOTE      Patient:  Erick Batres  YOB: 1935    MRN: [id number]     Acct: [de-identified]     Admit date: 4/28/2020    REASON FOR INITIAL CONSULT:-   Suspicion for pulmonary embolism. Acute hypoxia/sepsis/lactic acidosis    Dry  thristydid not sleep last  No feverno ciugh OSB   Wheezing  No astghma  No symb    Pt seen and Chart reviewed. She was continued on maintenance IV fluid and was seen by nephrology yesterday and IV fluid was changed to normal saline which she was getting at 75 mL an hour. Urine output is not accurate  She is hemodynamically stable not tachycardic and on 3 L nasal cannula maintaining saturation 98%  Urine is still dark in color but slightly lighter than before  Her last labs on 04/30/2020 shows creatinine 1.23, BUN 34 bicarbonate 20 potassium is 5.2 and sodium 136, WBC count is 15.6 better than 17.8 yesterday hemoglobin is 8.6 platelet 781. Subjective:   She is lethargic but easily arousable and follows command complains of lack of sleep last night. She denies shortness of breath at rest.  She denies sputum production and has occasional cough but she claimed that she had wheezing this morning  Denies fever chills hemoptysis night sweats and chest pain  She denies orthopnea PND pedal edema  Her back pain is better still complain of abdominal pain.     According to patient she never had asthma or COPD but she had bronchitis when she was in [place] and at that time she was given inhaler otherwise she never took any inhaler      Review of Systems -     CONSTITUTIONAL:  negative for  fevers, chills, sweats, fatigue, anorexia, and weight loss  EYES:  negative for  double vision, blurred vision, dry eyes, eye discharge, visual disturbance, irritation, redness, and icterus  HEENT:  negative for  tinnitus, earaches, nasal congestion, epistaxis, sore mouth, sore throat, hoarseness, and voice change  RESPIRATORY: Positive for mild wheezing negative for cough, time, acyanotic, in no respiratory distress and dehydrated, oral mucosa skin is dry  Mental status - alert, oriented to person, place, and time  Eyes - pupils equal and reactive, extraocular eye movements intact  Ears - right ear normal, left ear normal  Nose - normal and patent, no erythema, discharge or polyps  Mouth - mucous membranes dry, pharynx normal without lesions  Neck - supple, no significant adenopathy  Chest - no tachypnea, retractions or cyanosis, she had diminished breath sounds at left base, there is normal intermittent crackles present at right base cleared with cough, no expiratory wheezing no rhonchi  Heart - normal rate, regular rhythm, normal S1, S2, no murmurs, rubs, clicks or gallops  Abdomen - soft, nontender, nondistended, no masses or organomegaly, no rebound tenderness noted, bowel sounds normal  Neurological - alert, oriented, normal speech, no focal findings or movement disorder noted  Extremities - peripheral pulses normal, no pedal edema, no clubbing or cyanosis  Skin - normal coloration and turgor, no rashes, no suspicious skin lesions noted.       Diet:  Diet NPO Effective Now Exceptions are: Sips with Meds    Medications:Current Inpatient    Scheduled Meds:   docusate sodium  100 mg Oral Daily    senna  1 tablet Oral Nightly    enoxaparin  40 mg Subcutaneous Daily    budesonide-formoterol  2 puff Inhalation BID    meropenem  1 g Intravenous Q12H    latanoprost  1 drop Both Eyes Nightly    levothyroxine  75 mcg Oral Daily    pravastatin  80 mg Oral Nightly    sodium chloride flush  10 mL Intravenous 2 times per day    polyethylene glycol  17 g Oral Daily    morphine  2 mg Intravenous Once     Continuous Infusions:   sodium chloride 75 mL/hr at 04/30/20 1813    dextrose      sodium chloride       PRN Meds:glucose, dextrose, glucagon (rDNA), dextrose, albuterol sulfate HFA, sodium chloride flush, acetaminophen **OR** acetaminophen, ondansetron, opium-belladonna, morphine, HYDROcodone 5 mg - acetaminophen **OR** HYDROcodone 5 mg - acetaminophen    Objective:    CBC:   Recent Labs     04/28/20  1810 04/29/20  0624 04/30/20  0748   WBC  --  17.8* 15.6*   HGB 10.1* 8.8* 8.6*   PLT  --  245 226     BMP:    Recent Labs     04/29/20  0624 04/29/20  1738 04/30/20  0748     --  136   K 5.7* 5.3 5.2     --  103   CO2 20 -- 20   BUN 33*  --  34*   CREATININE 1.66*  --  1.23*   GLUCOSE 112*  --  124*     Calcium:  Recent Labs     04/30/20  0748   CALCIUM 8.5*     Ionized Calcium:No results for input(s): IONCA in the last 72 hours. Magnesium:No results for input(s): MG in the last 72 hours. Phosphorus:No results for input(s): PHOS in the last 72 hours. BNP:No results for input(s): BNP in the last 72 hours. Glucose:  Recent Labs     04/30/20  0217 04/30/20  1005 05/01/20  0715   POCGLU 130* 126* 263*     HgbA1C:   Recent Labs     04/29/20  1043   LABA1C 5.7     INR: No results for input(s): INR in the last 72 hours. Hepatic:   Recent Labs     04/29/20  2101   PROT 5.4*     Amylase and Lipase:No results for input(s): LACTA, AMYLASE in the last 72 hours. Lactic Acid: No results for input(s): LACTA in the last 72 hours. CARDIAC ENZYMES:No results for input(s): CKTOTAL, CKMB, CKMBINDEX, TROPONINI in the last 72 hours. BNP: No results for input(s): BNP in the last 72 hours. Lipids: No results for input(s): CHOL, TRIG, HDL, LDLCALC in the last 72 hours.     Invalid input(s): LDL  ABGs: No results found for: PH, PCO2, PO2, HCO3, O2SAT  Thyroid:   Lab Results   Component Value Date    TSH 1.93 04/29/2020      Urinalysis:   Recent Labs     04/29/20  1235   BACTERIA NOT REPORTED   COLORU RED*   PHUR 5.0   PROTEINU 2+*   RBCUA TOO NUMEROUS TO COUNT   SPECGRAV 1.012   BILIRUBINUR NEGATIVE  Verified by ictotest.*   [id number] [address] [facility]       CULTURES:    CXR 04/29/2020  Large left diaphragmatic hernia occupying two thirds of

## 2020-05-01 NOTE — PROGRESS NOTES
Got perfect serve about patient having abdominal pain and distension. Went to see patient. Patient report having last bowel movement on Sunday, not passing any gas for 2 days and has been nauseous all day today. On Exam:  Distended abdomen, tender to palpation in all quadrants, rounded bulge above the umbilicus, highly tympanitic on percussion in all quadrants. Ordered Acute abdominal xray series. Which showed Nonspecific bowel gas pattern could reflect adynamic ileus or partial small bowel obstruction. No pneumoperitoneum evident. Also showed stool burden in large bowel. These findings were not present on abdominal CT done yesterday. Talked to general surgery resident. Placed general surgery consult and will leave further management to them.          Christina Pretty MD.  Internal Medicine Resident   [facility]   5/1/2020, 12:51 AM

## 2020-05-01 NOTE — PROGRESS NOTES
Tabatha Barber, Lisbeth High  Urology Progress Note     Subjective:   No acute events overnight. Denies fever, chills. No documented fevers. Feels overall better. Having abdominal and back pain. COVID negative, transferred back to floor. Requiring intermittent irrigation for old clot. Patient Vitals for the past 24 hrs:   BP Temp Temp src Pulse Resp SpO2   05/01/20 0822 -- -- -- -- 22 98 %   05/01/20 0700 135/68 98.6 °F (37 °C) -- -- -- --   05/01/20 0402 138/79 98.6 °F (37 °C) Temporal 82 25 99 %   04/30/20 2133 (!) 148/78 98.3 °F (36.8 °C) Oral 100 27 98 %   04/30/20 0954 112/68 97.3 °F (36.3 °C) Oral 103 20 97 %       Intake/Output Summary (Last 24 hours) at 5/1/2020 0855  Last data filed at 5/1/2020 0813  Gross per 24 hour   Intake 1095.04 ml   Output 1235 ml   Net -139.96 ml       Recent Labs     04/28/20  0937 04/28/20  1810 04/29/20  0624 04/30/20  0748   WBC 12.6*  --  17.8* 15.6*   HGB 10.0* 10.1* 8.8* 8.6*   HCT 32.1* 34.2* 27.8* 26.6*   MCV 99.7  --  101.5 98.5     --  245 226     Recent Labs     04/28/20  0937 04/29/20  0624 04/29/20  1738 04/30/20  0748    137  --  136   K 4.5 5.7* 5.3 5.2    103  --  103   CO2 17* 20  --  20   BUN 21 33*  --  34*   CREATININE 1.60* 1.66*  --  1.23*       Recent Labs     04/29/20  1235   COLORU RED*   PHUR 5.0   WBCUA TOO NUMEROUS TO COUNT   RBCUA TOO NUMEROUS TO COUNT   MUCUS NOT REPORTED   TRICHOMONAS NOT REPORTED   YEAST NOT REPORTED   BACTERIA NOT REPORTED   SPECGRAV 1.012   LEUKOCYTESUR LARGE*   UROBILINOGEN Normal   BILIRUBINUR NEGATIVE  Verified by ictotest.*       Additional Lab/culture results: UCX +ESBL E coli     Physical Exam:  NAD, A/Ox3  Palpable radial pulses  Normal inspiratory effort on NC supplemental O2  Soft, TTP diffusely, non-distended. No peritoneal signs. No flank pain.   No bladder distension/tenderness noted  3 way jung catheter in, CBI off, hand irrigated for 5-10 cc old clot no active

## 2020-05-01 NOTE — PROGRESS NOTES
Patient c/o of back pain. Tylenol given. Patient bladder scanned and retaining 34mL of fluid. Urine output 600 mL. Continue to monitor.

## 2020-05-01 NOTE — PROGRESS NOTES
which progressed from a few drops into hailey hematuria by the next morning. She had experienced symptoms of burning with urination prior to the hematuria suggestive of UTI. She is on Plavix and ASA because of CAD. Urine culture has grown E coli ESBL +. The patient is receiving meropenem. She is currently receiving bladder irrigations to control the bleeding. A cystoscopy may be necessary. She gives no Hx to suggest COVID. Her CXR shows a large Lt hiatal hernia and evidence of COPD. No infiltrates to suggest pneumonia. Abd CT 4-29-20: Hiatal hernia and vascular congestion. CURRENT EXAMINATION: 5/1/2020     Pt AAO  Denies chills, fevers, SOB  Seen on 3L O2 NC  States that she has never required oxygen before    Pt Hgb has dropped 4gm since admission. 12.9-->8.6  COVID testing is negative, O2 requirement and weakness is likely related to blood loss anemia    Hope in place, CBI has been turned off. Hematuria has stopped, no clots noted  Per urology notes, plan for outpatient cystoscopy. Afebrile  Mild tachycardia, VS otherwise stable  Patient evaluated by surgery because of constipation  NPO at present. Laxatives and enemas    Labs, X rays reviewed: 5/1/2020    BUN: 33->34  Cr:1.66->1.23    WBC:17.8->15.6  Hb:12.9->10.0->8.8->8.6  Plat: 245->226    Ferritin 141  ESR 91    4-29 COVID negative     Cultures:  Urine:  · 4-27-20 E coli ESBL +  Blood:  · 4-29 x 2 no growth to date  Sputum :  ·   Wound:  ·     Discussed with patient, RN. I have personally reviewed the past medical history, past surgical history, medications, social history, and family history, and I have updated the database accordingly. Past Medical History:     Past Medical History:   Diagnosis Date    Coronary artery disease     ESBL (extended spectrum beta-lactamase) producing bacteria infection 6/30/2015    E.  Coli urine    HH (hiatus hernia)     Hyperlipidemia     Hypertension     Thyroid disease        Past Surgical sounds. Clear to auscultation, without wheezes, rales, or rhonchi. No dullness to percussion. Cardiovascular: Regular rate and rhythm without murmurs, rubs, or gallops. Abdomen: Soft, non tender. Bowel sounds normal. No organomegaly  All four Extremities: No cyanosis, clubbing, edema, or effusions. Neurologic: No gross sensory or motor deficits. Skin: Warm and dry with good turgor. Signs of peripheral arterial insufficiency. No ulcerations. No open wounds. Medical Decision Making -Laboratory:   I have independently reviewed/ordered the following labs:    CBC with Differential:   Recent Labs     04/29/20  0624 04/30/20  0748   WBC 17.8* 15.6*   HGB 8.8* 8.6*   HCT 27.8* 26.6*    226     BMP:   Recent Labs     04/29/20  0624 04/29/20  1738 04/30/20  0748     --  136   K 5.7* 5.3 5.2     --  103   CO2 20  --  20   BUN 33*  --  34*   CREATININE 1.66*  --  1.23*     Hepatic Function Panel:   Recent Labs     04/29/20  2101   PROT 5.4*     No results for input(s): RPR in the last 72 hours. No results for input(s): HIV in the last 72 hours. No results for input(s): BC in the last 72 hours. Lab Results   Component Value Date    MUCUS NOT REPORTED 04/29/2020    RBC 2.70 04/30/2020    TRICHOMONAS NOT REPORTED 04/29/2020    WBC 15.6 04/30/2020    YEAST NOT REPORTED 04/29/2020    TURBIDITY TURBID 04/29/2020     Lab Results   Component Value Date    CREATININE 1.23 04/30/2020    GLUCOSE 124 04/30/2020       Medical Decision Making-Imaging:     EXAMINATION:   ONE XRAY VIEW OF THE CHEST       4/29/2020 6:54 am       COMPARISON:   08/03/2013       HISTORY:   ORDERING SYSTEM PROVIDED HISTORY: wheezing   TECHNOLOGIST PROVIDED HISTORY:   wheezing   Reason for Exam: wheezing.  port upright at 625am       FINDINGS:   Cardiomegaly. March Bame is an increased degree of lucency seen within the   patient's left chest related to intragastric air, which could be related to a   combination of a known hiatal hernia as >=32  RESISTANT   ampicillin-sulbactam   Final     NOT REPORTED   aztreonam Resistant  Final     <=1  RESISTANT   ceFAZolin Resistant  Final     >=64  RESISTANT   ceFAZolin Resistant Cefazolin sensitivity results can be used to predict the effectiveness of oral cephalosporins (eg. Cephalexin) in uncomplicated Urinary Tract Infections due to E. coli, K. pneumoniae, and P. mirabilis Final    cefepime Resistant  Final     <=1  RESISTANT   cefTRIAXone Resistant  Final     >=64  RESISTANT   ciprofloxacin Resistant  Final     >=4  RESISTANT   ertapenem   Final     NOT REPORTED   Confirmatory Extended Spectrum Beta-Lactamase Positive POSITIVE Final    gentamicin Sensitive  Final     <=1  SUSCEPTIBLE   meropenem Sensitive  Final     <=0.25  SUSCEPTIBLE   nitrofurantoin Sensitive  Final     <=16  SUSCEPTIBLE   tigecycline   Final     NOT REPORTED   tobramycin Sensitive  Final     <=1  SUSCEPTIBLE   trimethoprim-sulfamethoxazole Resistant  Final     >=320  RESISTANT   piperacillin-tazobactam Resistant  Final     <=4  RESISTANT       Medical Decision Making-Other:     Note:  · Labs, medications, radiologic studies were reviewed with personal review of films  · Moderate Large amounts of data were reviewed  · Discussed with nursing Staff, Discharge planner  · Infection Control and Prevention measures reviewed  · All prior entries were reviewed  · Administer medications as ordered  · Prognosis: Good  · Discharge planning reviewed  · Follow up as outpatient. Thank you for allowing us to participate in the care of this patient. Please call with questions.     Nazario Garrett MD  Pager: [phone] - Office: [phone]

## 2020-05-01 NOTE — CONSULTS
Not on file   Social History Narrative    Not on file       Family History:       Problem Relation Age of Onset    Diabetes Brother     Stroke Father     Coronary Art Dis Other         siblings X3       REVIEW OF SYSTEMS:    CONSTITUTIONAL:  No recent weight gain/loss. Energy level normal for pt. HEENT:  No nasal congestion or drainage. CARDIOVASCULAR:  No chest pain, palpitations  GASTROINTESTINAL: Diffuse abdominal pain, nausea, constipation. No emesis. No rectal bleeding  GENITOURINARY: Positive for hematuria, +UTI   HEMATOLOGIC/LYMPHATIC:  No easy bruising. No history of cancer  ENDOCRINE: h/o hypothyroidism. No h/o DM. PHYSICAL EXAM:    VITALS:  BP (!) 148/78   Pulse 100   Temp 98.3 °F (36.8 °C) (Oral)   Resp 27   Ht 4' 11\" (1.499 m)   Wt 193 lb 8 oz (87.8 kg)   SpO2 98%   BMI 39.08 kg/m²   INTAKE/OUTPUT:     Intake/Output Summary (Last 24 hours) at 5/1/2020 0321  Last data filed at 4/30/2020 2232  Gross per 24 hour   Intake 1919.04 ml   Output -1850 ml   Net 3769.04 ml       CONSTITUTIONAL:  awake, alert, not distressed   ENT: normocephalic, atraumatic   NECK:  supple, symmetrical, trachea midline   LUNGS: Effort normal, no respiratory distress, no accessory muscle use   CARDIOVASCULAR: Regular rate and rhythm   ABDOMEN: softly distended, minimal TTP, no guarding or peritoneal signs. SKIN: CDI. No drainage, induration, or erythema noted.    MUSCULOSKELETAL:  Range of motion normal in hips, knees, shoulders, and spine  NEUROLOGIC:  Mental Status Exam:  Level of Alertness:   alert  Orientation:   oriented to person, place, and time    CBC:   Lab Results   Component Value Date    WBC 15.6 04/30/2020    RBC 2.70 04/30/2020    HGB 8.6 04/30/2020    HCT 26.6 04/30/2020    MCV 98.5 04/30/2020    MCH 31.9 04/30/2020    MCHC 32.3 04/30/2020    RDW 14.2 04/30/2020     04/30/2020    MPV 9.2 04/30/2020     BMP:    Lab Results   Component Value Date     04/30/2020    K 5.2 04/30/2020 There is a small amount of free pelvic fluid, of uncertain etiology. No adnexal masses. No inguinal or pelvic sidewall adenopathy. Peritoneum/Retroperitoneum: Atherosclerotic plaque is noted in the aorta and its branch vessels. No retroperitoneal adenopathy. Bones/Soft Tissues: There is anasarca. Degenerative changes are noted along the spine and sacroiliac joints. No fracture. 1. No ureteral calculi. 2. High attenuation material in the bladder which may be related to blood products. Cystoscopy may be of benefit for further evaluation. 3. Large defect in the left hemidiaphragm with herniation of stomach and colon in the left hemithorax. 4. Colonic diverticulosis. 5. Atherosclerotic disease. 6. Small amount of free pelvic fluid of uncertain etiology. 7. Bilateral inguinal hernias containing fat. Nm Lung Scan Perfusion Only    Result Date: 4/29/2020  EXAMINATION: NUCLEAR MEDICINE PERFUSION SCAN. 4/29/2020 TECHNIQUE: Ventilation scan not performed due to facility constraints. 2.9 millicuries of Tc 33B MAA was administered intravenously prior to planar imaging of the lungs in multiple projections. COMPARISON: Chest radiograph 04/29/2020. HISTORY: ORDERING SYSTEM PROVIDED HISTORY: rule out PE TECHNOLOGIST PROVIDED HISTORY: rule out PE Reason for Exam: rule out PE, short of breath Relevant Medical/Surgical History: UTI, hypertension FINDINGS: PERFUSION: Distribution of radiotracer is homogeneous. No segmental defects identified. CHEST RADIOGRAPH: There is elevation of left hemidiaphragm. Overall lung volumes are low. No focal lung infiltrate. Low Probability for Pulmonary Embolus. Xr Chest Portable    Result Date: 4/29/2020  EXAMINATION: ONE XRAY VIEW OF THE CHEST 4/29/2020 6:54 am COMPARISON: 08/03/2013 HISTORY: ORDERING SYSTEM PROVIDED HISTORY: wheezing TECHNOLOGIST PROVIDED HISTORY: wheezing Reason for Exam: wheezing. port upright at 625am FINDINGS: Cardiomegaly.   There is an increased degree !None      ! +------------------------------------+----------+---------------+----------+ ! Mid Femoral                         !Yes       ! Yes            ! None      ! +------------------------------------+----------+---------------+----------+ ! Dist Femoral                        !Yes       ! Yes            ! None      ! +------------------------------------+----------+---------------+----------+ ! Popliteal                           !Yes       ! Yes            ! None      ! +------------------------------------+----------+---------------+----------+ ! Sapheno Femoral Junction            ! Yes       ! Yes            ! None      ! +------------------------------------+----------+---------------+----------+ ! PTV                                 ! Yes       ! Yes            ! None      ! +------------------------------------+----------+---------------+----------+ ! Peroneal                            !Yes       ! Yes            ! None      ! +------------------------------------+----------+---------------+----------+ ! Gastroc                             ! Yes       ! Yes            ! None      ! +------------------------------------+----------+---------------+----------+ ! GSV Thigh                           ! Yes       ! Yes            ! None      ! +------------------------------------+----------+---------------+----------+ ! GSV Knee                            ! Yes       ! Yes            ! None      ! +------------------------------------+----------+---------------+----------+ ! GSV Ankle                           ! Yes       ! Yes            ! None      ! +------------------------------------+----------+---------------+----------+ ! SSV                                 ! Yes       ! Yes            ! None      ! +------------------------------------+----------+---------------+----------+ Right Doppler Measurements +---------------------------+------+------+--------------------------------+ ! Location                   ! Signal!Reflux! Reflux (msec)

## 2020-05-01 NOTE — PROGRESS NOTES
[facility]  Internal Medicine Teaching Residency Program  Inpatient Daily Progress Note  ______________________________________________________________________________    Patient: Erick Batres  YOB: 1935   EZA:[id number]    Acct: [de-identified]     Room: [facility]4969-43  Admit date: 4/28/2020  Today's date: 05/01/20  Number of days in the hospital: 3    SUBJECTIVE   Admitting Diagnosis: UTI due to extended-spectrum beta lactamase (ESBL) producing Escherichia coli  CC: Hematuria and dysuria  Pt examined at bedside. Chart & results reviewed. Afebrile  Vitals stable, slightly tachycardic, heart rate in high 90s and low 100s. On 3 L/min of oxygen via nasal cannula and saturating well. Labs reviewed  TSH 1.93  Cortisol 22.3  Kappa lambda ratio 2.10  Hepatitis panel nonreactive  COVID testing negative  C3-C4 levels within normal limits  Immunofixation pending  DVT scans negative  Perfusion studies-low probability for PE    Patient is eating ok, sleeping ok, normal bowel/ bladder movements. Patient is able to ambulate. Patient currently on meropenem  Urology on board- recommendations-continue CBI, hold aspirin Plavix, upper tract imaging CT urogram if creatinine improves. Outpatient cystoscopy with . ok to start Lovenox. Pulmonology following-recommendations start anticoagulation once okay with urology, continue meropenem, recommend EKG. ID following-  Continue meropenem    Nephro following- JOSIAH likely due to nonoliguric secondary to ischemic ATN from hypotension. bicarb disocntinue. Patient had abdominal distension and abdominal pain, likely due to constipation, gen surg consulted. CT scan performed 4/29 without contrast revealed a large hiatal hernia containing stomach and colon. KUB on 4/30 showed some gas distended small bowel. Medical management with enemas recommended.     ROS:  Constitutional:  negative for chills, fevers, sweats  Respiratory:  negative for cough, dyspnea on exertion, hemoptysis, shortness of breath, wheezing  Cardiovascular:  negative for chest pain, chest pressure/discomfort, lower extremity edema, palpitations  Gastrointestinal:  negative for abdominal pain, constipation, diarrhea, nausea, vomiting  Neurological:  negative for dizziness, headache    BRIEF HISTORY     The patient is a pleasant 80 y.o. female presents with a chief complaint of hematuria and dysuria and was found to have hematuria and UTI.     Patient was admitted under Urology service and underwent bladder irrigation. Due to JOSIAH and hyperkalemia, patient's care was transferred to medicine service.        Last Echo  2015 Left ventricle is normal in size. Global left ventricular systolic function  is normal. Estimated ejection fraction is 55-60% . Mild left ventricular hypertrophy. Mild aortic insufficiency. Mild mitral regurgitation. Mild-moderate tricuspid regurgitation. Mild pulmonary hypertension.     Last Cath  In 2013     Last lipid profile  4/25/19- lipid panel within normal limits     Last TSH 4/25/19- 2.7     Last HbA1C pending     On my evaluation,  Afebrile  Hypotensive  Heart rate in 90s  On 3 lpm of oxygen via nasal cannula and saturating well. Potassium 5. BUN 33  Creat 1.66  Lactic acid 5.0    ESR 91  Pro BNP 1678  Cortisol 22.3  WBC 17.8  Hemoglobin 8.8  Urine culture- ESBL E.Coli  C3 109  C4 39  UA- red color, turbid, positive for nitrites and leucocyte esterase  Urine protein 72  Urine creatinine 61.2  Urine sodium 100     CT Abdomen   1. No ureteral calculi. 2. High attenuation material in the bladder which may be related to blood   products.  Cystoscopy may be of benefit for further evaluation. 3. 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 4. Colonic diverticulosis. 5. Atherosclerotic disease.    6. Small amount of free pelvic fluid of uncertain budesonide-formoterol  2 puff Inhalation BID    meropenem  1 g Intravenous Q12H    latanoprost  1 drop Both Eyes Nightly    levothyroxine  75 mcg Oral Daily    pravastatin  80 mg Oral Nightly    sodium chloride flush  10 mL Intravenous 2 times per day    polyethylene glycol  17 g Oral Daily    morphine  2 mg Intravenous Once     Continuous Infusions:    sodium chloride 75 mL/hr at 04/30/20 1813    dextrose      sodium chloride       PRN Medicationsglucose, 15 g, PRN  dextrose, 12.5 g, PRN  glucagon (rDNA), 1 mg, PRN  dextrose, 100 mL/hr, PRN  albuterol sulfate HFA, 1 puff, Q4H PRN  sodium chloride flush, 10 mL, PRN  acetaminophen, 650 mg, Q6H PRN    Or  acetaminophen, 650 mg, Q6H PRN  ondansetron, 4 mg, Q6H PRN  opium-belladonna, 30 mg, Q8H PRN  morphine, 2 mg, Q3H PRN  HYDROcodone 5 mg - acetaminophen, 1 tablet, Q6H PRN    Or  HYDROcodone 5 mg - acetaminophen, 2 tablet, Q6H PRN        Diagnostic Labs:  CBC:   Recent Labs     04/28/20  0937 04/28/20  1810 04/29/20  0624 04/30/20  0748   WBC 12.6*  --  17.8* 15.6*   RBC 3.22*  --  2.74* 2.70*   HGB 10.0* 10.1* 8.8* 8.6*   HCT 32.1* 34.2* 27.8* 26.6*   MCV 99.7  --  101.5 98.5   RDW 13.5  --  14.1 14.2     --  245 226     BMP:   Recent Labs     04/28/20  0937 04/29/20  0624 04/29/20  1738 04/30/20  0748    137  --  136   K 4.5 5.7* 5.3 5.2    103  --  103   CO2 17* 20  --  20   BUN 21 33*  --  34*   CREATININE 1.60* 1.66*  --  1.23*     BNP: No results for input(s): BNP in the last 72 hours. PT/INR:   No results for input(s): PROTIME, INR in the last 72 hours. APTT: No results for input(s): APTT in the last 72 hours. CARDIAC ENZYMES: No results for input(s): CKMB, CKMBINDEX, TROPONINI in the last 72 hours.     Invalid input(s): CKTOTAL;3  FASTING LIPID PANEL:  Lab Results   Component Value Date    CHOL 164 04/25/2019    HDL 46 04/25/2019    TRIG 91 04/25/2019     LIVER PROFILE:   No results for input(s): AST, ALT, ALB, BILIDIR, BILITOT, hematuria- ESBL E. Coli. ID consulted. Patient on Meropenem. 2. Hematuria- Urology on board. Bladder irrigation. 3. Urosepsis. Patient's blood pressure responded to fluids. Continue meropenem and IV fluids. 4. JOSIAH likely due to ATN in presence of hypotension. Nephro consulted. Follow-up on urine studies. Strict intake output. 5. Hyperkalemia. Insulin dextrose for acute hyperkalemia. Will monitor. 6. Acute respiratory failure on 3lpm of oxygen via nasal cannula- Covid negative. pulm on board. DVT scans negative. Perfusion study low probability of PE.  7. Abdominal pain- SBO vs Ileus- gen surg on boar. NPO. On laxatives and suppositories. Diet: NPO  DVT ppx : Lovenox  GI ppx: Not indicated    PT/OT: Consulted  Discharge Planning / SW: James Pretty MD  PGY- 1   Internal Medicine Resident  [facility]  5/1/2020 7:12 AM    Attending Physician Statement  I have discussed the care of [facility] and I have examined the patient myselft and taken ros and hpi , including pertinent history and exam findings,  with the resident. I have reviewed the key elements of all parts of the encounter with the resident. I agree with the assessment, plan and orders as documented by the resident.       Electronically signed by Niurka Ivey MD

## 2020-05-02 ENCOUNTER — APPOINTMENT (OUTPATIENT)
Dept: GENERAL RADIOLOGY | Age: 85
DRG: 871 | End: 2020-05-02
Attending: UROLOGY
Payer: MEDICARE

## 2020-05-02 PROBLEM — I95.9 HYPOTENSION: Status: RESOLVED | Noted: 2020-04-29 | Resolved: 2020-05-02

## 2020-05-02 PROBLEM — E87.5 HYPERKALEMIA: Status: RESOLVED | Noted: 2020-04-29 | Resolved: 2020-05-02

## 2020-05-02 PROBLEM — N30.01 ACUTE HEMORRHAGIC CYSTITIS: Status: ACTIVE | Noted: 2020-05-02

## 2020-05-02 LAB
ANION GAP SERPL CALCULATED.3IONS-SCNC: 14 MMOL/L (ref 9–17)
BUN BLDV-MCNC: 36 MG/DL (ref 8–23)
BUN/CREAT BLD: ABNORMAL (ref 9–20)
CALCIUM SERPL-MCNC: 8.2 MG/DL (ref 8.6–10.4)
CHLORIDE BLD-SCNC: 103 MMOL/L (ref 98–107)
CO2: 21 MMOL/L (ref 20–31)
CREAT SERPL-MCNC: 0.89 MG/DL (ref 0.5–0.9)
GFR AFRICAN AMERICAN: >60 ML/MIN
GFR NON-AFRICAN AMERICAN: >60 ML/MIN
GFR SERPL CREATININE-BSD FRML MDRD: ABNORMAL ML/MIN/{1.73_M2}
GFR SERPL CREATININE-BSD FRML MDRD: ABNORMAL ML/MIN/{1.73_M2}
GLUCOSE BLD-MCNC: 102 MG/DL (ref 70–99)
GLUCOSE BLD-MCNC: 135 MG/DL (ref 65–105)
GLUCOSE BLD-MCNC: 87 MG/DL (ref 65–105)
HCT VFR BLD CALC: 26.4 % (ref 36.3–47.1)
HEMOGLOBIN: 8.1 G/DL (ref 11.9–15.1)
LACTIC ACID, WHOLE BLOOD: 0.9 MMOL/L (ref 0.7–2.1)
MCH RBC QN AUTO: 30.8 PG (ref 25.2–33.5)
MCHC RBC AUTO-ENTMCNC: 30.7 G/DL (ref 28.4–34.8)
MCV RBC AUTO: 100.4 FL (ref 82.6–102.9)
NRBC AUTOMATED: 0 PER 100 WBC
PDW BLD-RTO: 13.9 % (ref 11.8–14.4)
PLATELET # BLD: 273 K/UL (ref 138–453)
PMV BLD AUTO: 9.1 FL (ref 8.1–13.5)
POTASSIUM SERPL-SCNC: 4.6 MMOL/L (ref 3.7–5.3)
RBC # BLD: 2.63 M/UL (ref 3.95–5.11)
SODIUM BLD-SCNC: 138 MMOL/L (ref 135–144)
WBC # BLD: 12.4 K/UL (ref 3.5–11.3)

## 2020-05-02 PROCEDURE — 2700000000 HC OXYGEN THERAPY PER DAY

## 2020-05-02 PROCEDURE — 6360000002 HC RX W HCPCS: Performed by: STUDENT IN AN ORGANIZED HEALTH CARE EDUCATION/TRAINING PROGRAM

## 2020-05-02 PROCEDURE — 74018 RADEX ABDOMEN 1 VIEW: CPT

## 2020-05-02 PROCEDURE — 2060000000 HC ICU INTERMEDIATE R&B

## 2020-05-02 PROCEDURE — 99232 SBSQ HOSP IP/OBS MODERATE 35: CPT | Performed by: SURGERY

## 2020-05-02 PROCEDURE — 80048 BASIC METABOLIC PNL TOTAL CA: CPT

## 2020-05-02 PROCEDURE — 83605 ASSAY OF LACTIC ACID: CPT

## 2020-05-02 PROCEDURE — 99232 SBSQ HOSP IP/OBS MODERATE 35: CPT | Performed by: INTERNAL MEDICINE

## 2020-05-02 PROCEDURE — 6370000000 HC RX 637 (ALT 250 FOR IP): Performed by: STUDENT IN AN ORGANIZED HEALTH CARE EDUCATION/TRAINING PROGRAM

## 2020-05-02 PROCEDURE — 94640 AIRWAY INHALATION TREATMENT: CPT

## 2020-05-02 PROCEDURE — 74240 X-RAY XM UPR GI TRC 1CNTRST: CPT

## 2020-05-02 PROCEDURE — 6360000002 HC RX W HCPCS: Performed by: INTERNAL MEDICINE

## 2020-05-02 PROCEDURE — 2580000003 HC RX 258: Performed by: STUDENT IN AN ORGANIZED HEALTH CARE EDUCATION/TRAINING PROGRAM

## 2020-05-02 PROCEDURE — 6360000004 HC RX CONTRAST MEDICATION: Performed by: STUDENT IN AN ORGANIZED HEALTH CARE EDUCATION/TRAINING PROGRAM

## 2020-05-02 PROCEDURE — 82947 ASSAY GLUCOSE BLOOD QUANT: CPT

## 2020-05-02 PROCEDURE — 94761 N-INVAS EAR/PLS OXIMETRY MLT: CPT

## 2020-05-02 PROCEDURE — 85027 COMPLETE CBC AUTOMATED: CPT

## 2020-05-02 RX ORDER — ALBUTEROL SULFATE 2.5 MG/3ML
2.5 SOLUTION RESPIRATORY (INHALATION) 4 TIMES DAILY
Status: DISCONTINUED | OUTPATIENT
Start: 2020-05-02 | End: 2020-05-06

## 2020-05-02 RX ADMIN — IOHEXOL 180 ML: 240 INJECTION, SOLUTION INTRATHECAL; INTRAVASCULAR; INTRAVENOUS; ORAL at 13:16

## 2020-05-02 RX ADMIN — ALBUTEROL SULFATE 1 PUFF: 90 AEROSOL, METERED RESPIRATORY (INHALATION) at 08:24

## 2020-05-02 RX ADMIN — MORPHINE SULFATE 2 MG: 2 INJECTION, SOLUTION INTRAMUSCULAR; INTRAVENOUS at 08:59

## 2020-05-02 RX ADMIN — MEROPENEM 1 G: 1 INJECTION, POWDER, FOR SOLUTION INTRAVENOUS at 16:26

## 2020-05-02 RX ADMIN — METHYLNALTREXONE BROMIDE 12 MG: 12 INJECTION, SOLUTION SUBCUTANEOUS at 15:06

## 2020-05-02 RX ADMIN — ALBUTEROL SULFATE 2.5 MG: 2.5 SOLUTION RESPIRATORY (INHALATION) at 21:11

## 2020-05-02 RX ADMIN — ALBUTEROL SULFATE 1 PUFF: 90 AEROSOL, METERED RESPIRATORY (INHALATION) at 15:54

## 2020-05-02 RX ADMIN — BUDESONIDE AND FORMOTEROL FUMARATE DIHYDRATE 2 PUFF: 80; 4.5 AEROSOL RESPIRATORY (INHALATION) at 21:11

## 2020-05-02 RX ADMIN — LATANOPROST 1 DROP: 50 SOLUTION OPHTHALMIC at 20:51

## 2020-05-02 RX ADMIN — ALBUTEROL SULFATE 1 PUFF: 90 AEROSOL, METERED RESPIRATORY (INHALATION) at 12:06

## 2020-05-02 RX ADMIN — ENOXAPARIN SODIUM 40 MG: 40 INJECTION, SOLUTION INTRAVENOUS; SUBCUTANEOUS at 15:09

## 2020-05-02 RX ADMIN — BUDESONIDE AND FORMOTEROL FUMARATE DIHYDRATE 2 PUFF: 80; 4.5 AEROSOL RESPIRATORY (INHALATION) at 08:24

## 2020-05-02 RX ADMIN — MEROPENEM 1 G: 1 INJECTION, POWDER, FOR SOLUTION INTRAVENOUS at 22:21

## 2020-05-02 ASSESSMENT — PAIN SCALES - GENERAL
PAINLEVEL_OUTOF10: 9
PAINLEVEL_OUTOF10: 0

## 2020-05-02 NOTE — PROGRESS NOTES
negative for dizziness, headache    BRIEF HISTORY     The patient is a pleasant 85 y. o. female presents with a chief complaint of hematuria and dysuria and was found to have hematuria and UTI.     Patient was admitted under Urology service and underwent bladder irrigation.   Due to JOSIAH and hyperkalemia, patient's care was transferred to medicine service.        Last Echo   Global left ventricular systolic function is difficult to assess but appears  hyperdynamic. Visually estimated LV EF >55% %. No significant valvular regurgitation or stenosis seen     Last Cath  In 2013     Last lipid profile  4/25/19- lipid panel within normal limits     Last TSH 4/25/19- 2.7     Last HbA1C 5.7     In ER,   Afebrile  Hypotensive  Heart rate in 90s  On 3 lpm of oxygen via nasal cannula and saturating well. Potassium 5. BUN 33  Creat 1.66  Lactic acid 5.0    ESR 91  Pro BNP 1678  Cortisol 22.3  WBC 17.8  Hemoglobin 8.8  Urine culture- ESBL E.Coli  C3 109  C4 39  UA- red color, turbid, positive for nitrites and leucocyte esterase  Urine protein 72  Urine creatinine 61.2  Urine sodium 100     CT Abdomen   1. No ureteral calculi. 2. High attenuation material in the bladder which may be related to blood   products.  Cystoscopy may be of benefit for further evaluation. 3. 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 4. Colonic diverticulosis. 5. Atherosclerotic disease. 6. Small amount of free pelvic fluid of uncertain etiology.    7. Bilateral inguinal hernias containing fat.      CXR-   The patient's stomach overlies the left lower 2/3 of the chest, which has   increased from the previous known hiatal hernia seen in 2013.  This could be   related to worsening of the patient's hiatal hernia, though a component of an   elevated left hemidiaphragm is in the differential.  This would be best   assessed with a CT scan of the chest if clinically concerned.     Medicationsalbuterol, 2.5 mg, As Directed RT PRN  glucose, 15 g, PRN  dextrose, 12.5 g, PRN  glucagon (rDNA), 1 mg, PRN  dextrose, 100 mL/hr, PRN  albuterol sulfate HFA, 1 puff, Q4H PRN  sodium chloride flush, 10 mL, PRN  acetaminophen, 650 mg, Q6H PRN    Or  acetaminophen, 650 mg, Q6H PRN  ondansetron, 4 mg, Q6H PRN  opium-belladonna, 30 mg, Q8H PRN  morphine, 2 mg, Q3H PRN  HYDROcodone 5 mg - acetaminophen, 1 tablet, Q6H PRN    Or  HYDROcodone 5 mg - acetaminophen, 2 tablet, Q6H PRN        Diagnostic Labs:  CBC:   Recent Labs     04/30/20  0748 05/01/20  1852   WBC 15.6* 15.2*   RBC 2.70* 2.94*   HGB 8.6* 9.3*   HCT 26.6* 30.1*   MCV 98.5 102.4   RDW 14.2 13.9    231     BMP:   Recent Labs     04/29/20  1738 04/30/20  0748 05/01/20  1852   NA  --  136 138   K 5.3 5.2 4.9   CL  --  103 101   CO2  --  20 22   BUN  --  34* 31*   CREATININE  --  1.23* 1.01*     BNP: No results for input(s): BNP in the last 72 hours. PT/INR: No results for input(s): PROTIME, INR in the last 72 hours. APTT: No results for input(s): APTT in the last 72 hours. CARDIAC ENZYMES: No results for input(s): CKMB, CKMBINDEX, TROPONINI in the last 72 hours. Invalid input(s): CKTOTAL;3  FASTING LIPID PANEL:  Lab Results   Component Value Date    CHOL 164 04/25/2019    HDL 46 04/25/2019    TRIG 91 04/25/2019     LIVER PROFILE: No results for input(s): AST, ALT, ALB, BILIDIR, BILITOT, ALKPHOS in the last 72 hours. MICROBIOLOGY:   Lab Results   Component Value Date/Time    CULTURE NO GROWTH 3 DAYS 04/29/2020 09:00 PM       Imaging:    Ct Abdomen Pelvis Wo Contrast Additional Contrast? None    Result Date: 4/29/2020  1. No ureteral calculi. 2. High attenuation material in the bladder which may be related to blood products. Cystoscopy may be of benefit for further evaluation. 3. Large defect in the left hemidiaphragm with herniation of stomach and colon in the left hemithorax. 4. Colonic diverticulosis. 5. Atherosclerotic disease.  6. Small amount of free pelvic fluid of uncertain etiology. 7. Bilateral inguinal hernias containing fat. Xr Acute Abd Series Chest 1 Vw    Result Date: 4/30/2020  Nonspecific bowel gas pattern could reflect adynamic ileus or partial small bowel obstruction. No pneumoperitoneum evident. Asymmetric elevation left hemidiaphragm with left basilar relaxation atelectasis. Nm Lung Scan Perfusion Only    Result Date: 4/29/2020  Low Probability for Pulmonary Embolus. Us Renal Limited    Result Date: 4/30/2020  The kidneys are not obstructed. Xr Chest Portable    Result Date: 4/29/2020  The patient's stomach overlies the left lower 2/3 of the chest, which has increased from the previous known hiatal hernia seen in 2013. This could be related to worsening of the patient's hiatal hernia, though a component of an elevated left hemidiaphragm is in the differential.  This would be best assessed with a CT scan of the chest if clinically concerned. Cardiomegaly. COPD, with some bronchial thickening noted which may be related to superimposed acute bronchitis. ASSESSMENT & PLAN     Assessment and Plan:    Principal Problem:    UTI due to extended-spectrum beta lactamase (ESBL) producing Escherichia coli  Active Problems:    Hyperlipidemia    Hypertension    Chronic coronary artery disease    Presence of cardiac and vascular implant and graft    Hematuria    Gastroesophageal reflux disease without esophagitis    Vitamin B12 deficiency    Cystitis    JOSIAH (acute kidney injury) ([address]    Severe sepsis present on admission due to E. Coli UTI    Neutrophilic leukocytosis    Anemia    COVID-19 virus not detected    Intestinal occlusion (HCC)    Acute hemorrhagic cystitis  Resolved Problems:    Hyperkalemia    Hypotension    1. UTI with hematuria- ESBL E. Coli. ID consulted. Patient on Meropenem since 4-29-20.  2. Hematuria- Urology signed off. Discharge with jung, op cystoscopy and urogram with Dr. Dayton Amado. 3. Urosepsis. Improving. Patient's blood pressure responded to fluids. Continue meropenem and IV fluids. 4. JOSIAH non oliguric secondary to ischemic ATN in presence of hypotension. Improving. Nephro on board. 5. Hyperkalemia. Insulin dextrose for acute hyperkalemia. Will monitor. resolved  6. Acute respiratory failure on 2lpm of oxygen via nasal cannula- Covid negative. pulm on board. DVT scans negative. Perfusion study low probability of PE. Wean off oxygen as tolerated  7. Abdominal pain- SBO vs Ileus- gen surg on board. NPO. On laxatives and suppositories.  NG insertion     Diet: NPO  DVT ppx : Lovenox  GI ppx: Not indicated     PT/OT: Consulted  Discharge Planning / Sheldon King: home with      Maury Saint, MD  PGY- 1   Internal Medicine Resident  [facility]  5/2/2020 10:00 AM

## 2020-05-02 NOTE — PROGRESS NOTES
05/01/20  1852   NA  --  136 138   K 5.3 5.2 4.9   CL  --  103 101   CO2  --  20 22   BUN  --  34* 31*   CREATININE  --  1.23* 1.01*   GLUCOSE  --  124* 105*   CALCIUM  --  8.5* 8.3*      ANDERSON:      Lab Results   Component Value Date    ANDERSON NEGATIVE 04/29/2020     SPEP:  Lab Results   Component Value Date    PROT 5.4 04/29/2020    ALBCAL 2.9 04/29/2020    ALBPCT 53 04/29/2020    LABALPH 0.4 04/29/2020    LABALPH 0.9 04/29/2020    A1PCT 7 04/29/2020    A2PCT 16 04/29/2020    LABBETA 0.7 04/29/2020    BETAPCT 13 04/29/2020    GAMGLOB 0.6 04/29/2020    GGPCT 11 04/29/2020    PATH ELECTRONICALLY SIGNED. Janina Manuel M.D. 04/29/2020    PATH ELECTRONICALLY SIGNED.  Janina Manuel M.D. 04/29/2020     C3:     Lab Results   Component Value Date    C3 106 04/29/2020     C4:     Lab Results   Component Value Date    C4 40 04/29/2020     Hep BsAg:         Lab Results   Component Value Date    HEPBSAG NONREACTIVE 04/29/2020     Hep C AB:          Lab Results   Component Value Date    HEPCAB NONREACTIVE 04/29/2020       Urinalysis/Chemistries:      Lab Results   Component Value Date    NITRU POSITIVE 04/29/2020    COLORU RED 04/29/2020    PHUR 5.0 04/29/2020    WBCUA TOO NUMEROUS TO COUNT 04/29/2020    RBCUA TOO NUMEROUS TO COUNT 04/29/2020    MUCUS NOT REPORTED 04/29/2020    TRICHOMONAS NOT REPORTED 04/29/2020    YEAST NOT REPORTED 04/29/2020    BACTERIA NOT REPORTED 04/29/2020    CLARITYU cloudy 09/09/2015    SPECGRAV 1.012 04/29/2020    LEUKOCYTESUR LARGE 04/29/2020    UROBILINOGEN Normal 04/29/2020    BILIRUBINUR NEGATIVE  Verified by ictotest. 04/29/2020    BILIRUBINUR negative 09/09/2015    BLOODU small 09/09/2015    GLUCOSEU TRACE 04/29/2020    KETUA NEGATIVE 04/29/2020    AMORPHOUS NOT REPORTED 04/29/2020     Urine Sodium:     Lab Results   Component Value Date    RITO 100 04/29/2020     Urine Potassium:    Lab Results   Component Value Date    KUR 34.9 04/29/2020     Urine Chloride:    Lab Results   Component

## 2020-05-02 NOTE — PROGRESS NOTES
Hypertension     Hypotension 4/29/2020    Thyroid disease        Past Surgical  History:     Past Surgical History:   Procedure Laterality Date    ANGIOPLASTY      BLADDER SUSPENSION      CATARACT REMOVAL WITH IMPLANT Bilateral     TOTAL KNEE ARTHROPLASTY Right        Medications:      albuterol  2.5 mg Nebulization 4x daily    docusate sodium  100 mg Oral Daily    senna  1 tablet Oral Nightly    enoxaparin  40 mg Subcutaneous Daily    budesonide-formoterol  2 puff Inhalation BID    meropenem  1 g Intravenous Q12H    latanoprost  1 drop Both Eyes Nightly    levothyroxine  75 mcg Oral Daily    pravastatin  80 mg Oral Nightly    sodium chloride flush  10 mL Intravenous 2 times per day    polyethylene glycol  17 g Oral Daily       Social History:     Social History     Socioeconomic History    Marital status:      Spouse name: Not on file    Number of children: Not on file    Years of education: Not on file    Highest education level: Not on file   Occupational History    Not on file   Social Needs    Financial resource strain: Not on file    Food insecurity     Worry: Not on file     Inability: Not on file    Transportation needs     Medical: Not on file     Non-medical: Not on file   Tobacco Use    Smoking status: Never Smoker    Smokeless tobacco: Never Used   Substance and Sexual Activity    Alcohol use: No    Drug use: No    Sexual activity: Not on file   Lifestyle    Physical activity     Days per week: Not on file     Minutes per session: Not on file    Stress: Not on file   Relationships    Social connections     Talks on phone: Not on file     Gets together: Not on file     Attends [employer] service: Not on file     Active member of club or organization: Not on file     Attends meetings of clubs or organizations: Not on file     Relationship status: Not on file    Intimate partner violence     Fear of current or ex partner: Not on file     Emotionally abused: Not on

## 2020-05-02 NOTE — PLAN OF CARE
Attestation and add on       I have discussed the care of Deepti Porras , including pertinent history and exam findings,    5/2/20   with the resident. I have seen and examined the patient and the key elements of all parts of the encounter have been performed by me . I agree with the assessment, plan and orders as documented by the resident. Principal Problem:    UTI due to extended-spectrum beta lactamase (ESBL) producing Escherichia coli  Active Problems:    Hyperlipidemia    Hypertension    Chronic coronary artery disease    Presence of cardiac and vascular implant and graft    Hematuria    Gastroesophageal reflux disease without esophagitis    Vitamin B12 deficiency    Cystitis    JOSIAH (acute kidney injury) ([facility] [address]    Severe sepsis present on admission due to E. Coli UTI    Neutrophilic leukocytosis    Anemia    COVID-19 virus not detected  Resolved Problems:    Hyperkalemia    Hypotension        --   CLINICAL COURSE ---          clinical course has worsened as indicated by ileus    -Patient was admitted with acute hemorrhagic cystitis urine culture showed ESBL    Patient is on meropenem  However has developed abdominal distention with distended bowels  Ileus versus small bowel obstruction  Also \"concern about narcotic induced ileus or obstruction    General surgery consult noted  Patient is being started on NG tube to low intermittent suction  I discussed possibility of using Relistor or for possible opioid associated issues         Condition    [x] ill ,     [] high risk , [] critical ,        [] improved but still labile                                        [] delirium ,      [x] --- abdominal distention--,                 [] I----     Unit  [] ICU           [] PICU       [x] MED_SRG             []  Other  Prognosis -              Medications: Allergies:     Allergies   Allergen Reactions    Bactrim [Sulfamethoxazole-Trimethoprim] Hives    Sulfamethoxazole-Trimethoprim        Current Meds:

## 2020-05-02 NOTE — DISCHARGE INSTR - COC
Continuity of Care Form    Patient Name: Gardenia Woo   :  1935  MRN:  [id number]    Admit date:  2020  Discharge date:  2020    Code Status Order: Full Code   Advance Directives:     Admitting Physician:  Justina Sahu MD  PCP: Cesar Webb    Discharging Nurse: [facility] Unit/Room#: 9525/7102-75  Discharging Unit Phone Number: [phone]    Emergency Contact:   Extended Emergency Contact Information  Primary Emergency Contact: Abebe Cross  Address: [address] Phone: [phone]  Relation: Spouse    Past Surgical History:  Past Surgical History:   Procedure Laterality Date    ANGIOPLASTY      BLADDER SUSPENSION      CATARACT REMOVAL WITH IMPLANT Bilateral     TOTAL KNEE ARTHROPLASTY Right        Immunization History: There is no immunization history on file for this patient. Active Problems:  Patient Active Problem List   Diagnosis Code    ST elevation myocardial infarction (STEMI) ([facility] I21.3    Hyperlipidemia E78.5    Hypertension I10    Chronic coronary artery disease I25.10    Presence of cardiac and vascular implant and graft Z95.9    Hematuria R31.9    Hypothyroidism E03.9    Gastroesophageal reflux disease without esophagitis K21.9    Vitamin B12 deficiency E53.8    Cystitis N30.90    UTI due to extended-spectrum beta lactamase (ESBL) producing Escherichia coli N39.0, B96.29, Z16.12    JOSIAH (acute kidney injury) ([facility] N17.9    Severe sepsis present on admission due to E.  Coli UTI A41.9, V15.51    Neutrophilic leukocytosis R45.8    Anemia D64.9    Mixed incontinence N39.46    Urinary tract infection N39.0    Cobalamin deficiency E53.8    COVID-19 virus not detected [id number]       Isolation/Infection:   Isolation          Contact        Patient Infection Status     Infection Onset Added Last Indicated Last Indicated By Review Planned Expiration Resolved Resolved By    ESBL (Extended signed by Noe Fortune MD on 5/6/20 at 1:56 PM EDT

## 2020-05-02 NOTE — PROGRESS NOTES
ABDOMEN AND PELVIS WITHOUT CONTRAST 4/29/2020 8:27 am TECHNIQUE: CT of the abdomen and pelvis was performed without the administration of intravenous contrast. Multiplanar reformatted images are provided for review. Dose modulation, iterative reconstruction, and/or weight based adjustment of the mA/kV was utilized to reduce the radiation dose to as low as reasonably achievable. COMPARISON: 08/26/2013 HISTORY: ORDERING SYSTEM PROVIDED HISTORY: elevated creatinine TECHNOLOGIST PROVIDED HISTORY: elevated creatinine rule out obstructive uropathy Reason for Exam: elevated creatinine Acuity: Unknown Type of Exam: Unknown FINDINGS: Lower Chest: Atherosclerotic plaque is noted along the aortic arch. The heart size is normal.  Coronary artery vascular calcifications are noted. There is a mild pericardial effusion. There is a defect in the left hemidiaphragm with herniation of the stomach and colon through the defect and into the left hemithorax. There is a trace left pleural effusion. Organs: Unenhanced images through the liver are normal in appearance. The gallbladder is unremarkable. The adrenal glands are unremarkable. The pancreas is atrophic. The spleen is unremarkable. Unenhanced images through the kidneys are unremarkable. There are scattered colonic diverticula. There is a left inguinal hernia containing fat and colon. GI/Bowel: The uterus is to the left of midline. There are no adnexal masses. There is a Hope catheter noted in the bladder with high attenuation material which may be related to blood Pelvis: There is a right inguinal hernia containing fat. There is a small amount of free pelvic fluid, of uncertain etiology. No adnexal masses. No inguinal or pelvic sidewall adenopathy. Peritoneum/Retroperitoneum: Atherosclerotic plaque is noted in the aorta and its branch vessels. No retroperitoneal adenopathy. Bones/Soft Tissues: There is anasarca.   Degenerative changes are noted along the spine and sacroiliac joints. No fracture. 1. No ureteral calculi. 2. High attenuation material in the bladder which may be related to blood products. Cystoscopy may be of benefit for further evaluation. 3. Large defect in the left hemidiaphragm with herniation of stomach and colon in the left hemithorax. 4. Colonic diverticulosis. 5. Atherosclerotic disease. 6. Small amount of free pelvic fluid of uncertain etiology. 7. Bilateral inguinal hernias containing fat. Nm Lung Scan Perfusion Only    Result Date: 4/29/2020  EXAMINATION: NUCLEAR MEDICINE PERFUSION SCAN. 4/29/2020 TECHNIQUE: Ventilation scan not performed due to facility constraints. 2.9 millicuries of Tc 51E MAA was administered intravenously prior to planar imaging of the lungs in multiple projections. COMPARISON: Chest radiograph 04/29/2020. HISTORY: ORDERING SYSTEM PROVIDED HISTORY: rule out PE TECHNOLOGIST PROVIDED HISTORY: rule out PE Reason for Exam: rule out PE, short of breath Relevant Medical/Surgical History: UTI, hypertension FINDINGS: PERFUSION: Distribution of radiotracer is homogeneous. No segmental defects identified. CHEST RADIOGRAPH: There is elevation of left hemidiaphragm. Overall lung volumes are low. No focal lung infiltrate. Low Probability for Pulmonary Embolus. Xr Chest Portable    Result Date: 4/29/2020  EXAMINATION: ONE XRAY VIEW OF THE CHEST 4/29/2020 6:54 am COMPARISON: 08/03/2013 HISTORY: ORDERING SYSTEM PROVIDED HISTORY: wheezing TECHNOLOGIST PROVIDED HISTORY: wheezing Reason for Exam: wheezing. port upright at 625am FINDINGS: Cardiomegaly. There is an increased degree of lucency seen within the patient's left chest related to intragastric air, which could be related to a combination of a known hiatal hernia as well as elevated left hemidiaphragm or worsening of hiatal hernia. Underlying COPD. Bronchial thickening is identified. No pneumothorax. Osteopenia. Degenerative changes in the shoulders and spine. The patient's stomach overlies the left lower 2/3 of the chest, which has increased from the previous known hiatal hernia seen in 2013. This could be related to worsening of the patient's hiatal hernia, though a component of an elevated left hemidiaphragm is in the differential.  This would be best assessed with a CT scan of the chest if clinically concerned. Cardiomegaly. COPD, with some bronchial thickening noted which may be related to superimposed acute bronchitis. Vl Dup Lower Extremity Venous Bilateral    Result Date: 4/29/2020    [facility]  Vascular Lower Extremities DVT Study Procedure   Patient Name  Ara Fleming     Date of Study         04/29/2020                MACIEJ RAZA   Date of Birth 1935  Gender                Female   Age           80 year(s)  Race                     Room Number   2020   Corporate ID  [id number]  #   Patient Kenzie  [de-identified]  #   MR #          [id number]     Sonographer           Afsaneh Peck RVT, RDMS   Accession #   [id number]   Interpreting          [address] [place] [address]                            Physician   Referring                 Referring Physician   Sy Marie  Nurse  Practitioner  Procedure Type of Study:   Veins: Lower Extremities DVT Study, Venous Scan Lower Bilateral.  Indications for Study:Swelling. Patient Status: In Patient. Conclusions   Summary   No evidence of superficial or deep venous thrombosis in both lower  extremities.    Signature   ----------------------------------------------------------------  Electronically signed by Afsaneh Peck RVT, RDMS(Sonographer) on 04/29/2020 06:36 PM  ----------------------------------------------------------------   ----------------------------------------------------------------  Electronically signed by Phillis Primmer Reyes,Arthur(Interpreting  physician) on 04/29/2020 06:54 PM  ----------------------------------------------------------------  Findings:   Right Impression:                    Left Impression:  The common femoral, femoral,         The common femoral, femoral,  popliteal and tibial veins           popliteal and tibial veins  demonstrate normal compressibility   demonstrate normal compressibility  and augmentation. and augmentation. Normal compressibility of the great  Normal compressibility of the great  saphenous vein. saphenous vein. Normal compressibility of the small  Normal compressibility of the small  saphenous vein. saphenous vein. Risk Factors History +--------------------------+---------------------+-------------------------+ ! Diagnosis                 ! Date                 ! Comments                 ! +--------------------------+---------------------+-------------------------+ ! Other                     !                     !COVID 19                 ! +--------------------------+---------------------+-------------------------+   - The patient's risk factor(s) include: dyslipidemia and arterial     hypertension. Velocities are measured in cm/s ; Diameters are measured in cm Right Lower Extremities DVT Study Measurements Right 2D Measurements +------------------------------------+----------+---------------+----------+ ! Location                            ! Visualized! Compressibility! Thrombosis! +------------------------------------+----------+---------------+----------+ ! Common Femoral                      !Yes       ! Yes            ! None      ! +------------------------------------+----------+---------------+----------+ ! Prox Femoral                        !Yes       ! Yes            ! None      ! +------------------------------------+----------+---------------+----------+ ! Mid Femoral                         !Yes       ! Yes            ! None      ! +------------------------------------+----------+---------------+----------+ ! Dist Femoral                        !Yes       ! Yes            ! None      ! ! +---------------------------+------+------+--------------------------------+ ! Popliteal                  !Phasic!      !                                ! +---------------------------+------+------+--------------------------------+ Left Lower Extremities DVT Study Measurements Left 2D Measurements +------------------------------------+----------+---------------+----------+ ! Location                            ! Visualized! Compressibility! Thrombosis! +------------------------------------+----------+---------------+----------+ ! Common Femoral                      !Yes       ! Yes            ! None      ! +------------------------------------+----------+---------------+----------+ ! Prox Femoral                        !Yes       ! Yes            ! None      ! +------------------------------------+----------+---------------+----------+ ! Mid Femoral                         !Yes       ! Yes            ! None      ! +------------------------------------+----------+---------------+----------+ ! Dist Femoral                        !Yes       ! Yes            ! None      ! +------------------------------------+----------+---------------+----------+ ! Popliteal                           !Yes       ! Yes            ! None      ! +------------------------------------+----------+---------------+----------+ ! Sapheno Femoral Junction            ! Yes       ! Yes            ! None      ! +------------------------------------+----------+---------------+----------+ ! PTV                                 ! Yes       ! Yes            ! None      ! +------------------------------------+----------+---------------+----------+ ! Peroneal                            !Yes       ! Yes            ! None      ! +------------------------------------+----------+---------------+----------+ ! Gastroc                             ! Yes       ! Yes            ! None      ! +------------------------------------+----------+---------------+----------+ ! GSV Thigh !Yes       !Yes            ! None      ! +------------------------------------+----------+---------------+----------+ ! GSV Knee                            ! Yes       ! Yes            ! None      ! +------------------------------------+----------+---------------+----------+ ! GSV Ankle                           ! Yes       ! Yes            ! None      ! +------------------------------------+----------+---------------+----------+ ! SSV                                 ! Yes       ! Yes            ! None      ! +------------------------------------+----------+---------------+----------+ Left Doppler Measurements +---------------------------+------+------+--------------------------------+ ! Location                   ! Signal!Reflux! Reflux (msec)                   ! +---------------------------+------+------+--------------------------------+ ! Common Femoral             !Phasic!      !                                ! +---------------------------+------+------+--------------------------------+ ! Prox Femoral               !Phasic!      !                                ! +---------------------------+------+------+--------------------------------+ ! Popliteal                  !Phasic!      !                                ! +---------------------------+------+------+--------------------------------+      ASSESSMENT:  Active Hospital Problems    Diagnosis Date Noted    COVID-19 virus not detected [[id number]]     UTI due to extended-spectrum beta lactamase (ESBL) producing Escherichia coli [N39.0, B96.29, Z16.12] 04/29/2020    [facility] [N17.9] 04/29/2020    Severe sepsis present on admission due to E.  Coli UTI [A41.9, R65.20] 72/44/7558    Neutrophilic leukocytosis [E72.2] 04/29/2020    Anemia [D64.9] 04/29/2020    Hematuria [R31.9] 04/27/2020    Gastroesophageal reflux disease without esophagitis [K21.9] 04/27/2020    Vitamin B12 deficiency [E53.8] 04/27/2020    Cystitis [N30.90] 11/17/2016   

## 2020-05-03 ENCOUNTER — APPOINTMENT (OUTPATIENT)
Dept: GENERAL RADIOLOGY | Age: 85
DRG: 871 | End: 2020-05-03
Attending: UROLOGY
Payer: MEDICARE

## 2020-05-03 LAB
ADENOVIRUS PCR: NOT DETECTED
ANION GAP SERPL CALCULATED.3IONS-SCNC: 14 MMOL/L (ref 9–17)
BORDETELLA PARAPERTUSSIS: NOT DETECTED
BORDETELLA PERTUSSIS PCR: NOT DETECTED
BUN BLDV-MCNC: 33 MG/DL (ref 8–23)
BUN/CREAT BLD: ABNORMAL (ref 9–20)
CALCIUM SERPL-MCNC: 8.3 MG/DL (ref 8.6–10.4)
CHLAMYDIA PNEUMONIAE BY PCR: NOT DETECTED
CHLORIDE BLD-SCNC: 107 MMOL/L (ref 98–107)
CO2: 25 MMOL/L (ref 20–31)
CORONAVIRUS 229E PCR: NOT DETECTED
CORONAVIRUS HKU1 PCR: NOT DETECTED
CORONAVIRUS NL63 PCR: NOT DETECTED
CORONAVIRUS OC43 PCR: NOT DETECTED
CREAT SERPL-MCNC: 0.94 MG/DL (ref 0.5–0.9)
GFR AFRICAN AMERICAN: >60 ML/MIN
GFR NON-AFRICAN AMERICAN: 57 ML/MIN
GFR SERPL CREATININE-BSD FRML MDRD: ABNORMAL ML/MIN/{1.73_M2}
GFR SERPL CREATININE-BSD FRML MDRD: ABNORMAL ML/MIN/{1.73_M2}
GLUCOSE BLD-MCNC: 77 MG/DL (ref 70–99)
HCT VFR BLD CALC: 28.2 % (ref 36.3–47.1)
HEMOGLOBIN: 8.6 G/DL (ref 11.9–15.1)
HUMAN METAPNEUMOVIRUS PCR: NOT DETECTED
INFLUENZA A BY PCR: NOT DETECTED
INFLUENZA A H1 (2009) PCR: NORMAL
INFLUENZA A H1 PCR: NORMAL
INFLUENZA A H3 PCR: NORMAL
INFLUENZA B BY PCR: NOT DETECTED
MCH RBC QN AUTO: 32 PG (ref 25.2–33.5)
MCHC RBC AUTO-ENTMCNC: 30.5 G/DL (ref 28.4–34.8)
MCV RBC AUTO: 104.8 FL (ref 82.6–102.9)
MYCOPLASMA PNEUMONIAE PCR: NOT DETECTED
NRBC AUTOMATED: 0.5 PER 100 WBC
PARAINFLUENZA 1 PCR: NOT DETECTED
PARAINFLUENZA 2 PCR: NOT DETECTED
PARAINFLUENZA 3 PCR: NOT DETECTED
PARAINFLUENZA 4 PCR: NOT DETECTED
PDW BLD-RTO: 13.9 % (ref 11.8–14.4)
PLATELET # BLD: 277 K/UL (ref 138–453)
PMV BLD AUTO: 8.7 FL (ref 8.1–13.5)
POTASSIUM SERPL-SCNC: 4.3 MMOL/L (ref 3.7–5.3)
RBC # BLD: 2.69 M/UL (ref 3.95–5.11)
RESP SYNCYTIAL VIRUS PCR: NOT DETECTED
RHINO/ENTEROVIRUS PCR: NOT DETECTED
SODIUM BLD-SCNC: 146 MMOL/L (ref 135–144)
SPECIMEN DESCRIPTION: NORMAL
WBC # BLD: 11.5 K/UL (ref 3.5–11.3)

## 2020-05-03 PROCEDURE — 6370000000 HC RX 637 (ALT 250 FOR IP): Performed by: STUDENT IN AN ORGANIZED HEALTH CARE EDUCATION/TRAINING PROGRAM

## 2020-05-03 PROCEDURE — 74018 RADEX ABDOMEN 1 VIEW: CPT

## 2020-05-03 PROCEDURE — 6360000002 HC RX W HCPCS: Performed by: STUDENT IN AN ORGANIZED HEALTH CARE EDUCATION/TRAINING PROGRAM

## 2020-05-03 PROCEDURE — 97530 THERAPEUTIC ACTIVITIES: CPT

## 2020-05-03 PROCEDURE — 2700000000 HC OXYGEN THERAPY PER DAY

## 2020-05-03 PROCEDURE — 99233 SBSQ HOSP IP/OBS HIGH 50: CPT | Performed by: INTERNAL MEDICINE

## 2020-05-03 PROCEDURE — 2580000003 HC RX 258: Performed by: STUDENT IN AN ORGANIZED HEALTH CARE EDUCATION/TRAINING PROGRAM

## 2020-05-03 PROCEDURE — 2060000000 HC ICU INTERMEDIATE R&B

## 2020-05-03 PROCEDURE — 80048 BASIC METABOLIC PNL TOTAL CA: CPT

## 2020-05-03 PROCEDURE — 99232 SBSQ HOSP IP/OBS MODERATE 35: CPT | Performed by: INTERNAL MEDICINE

## 2020-05-03 PROCEDURE — 36415 COLL VENOUS BLD VENIPUNCTURE: CPT

## 2020-05-03 PROCEDURE — 71045 X-RAY EXAM CHEST 1 VIEW: CPT

## 2020-05-03 PROCEDURE — 6360000002 HC RX W HCPCS: Performed by: INTERNAL MEDICINE

## 2020-05-03 PROCEDURE — 0100U HC RESPIRPTHGN MULT REV TRANS & AMP PRB TECH 21 TRGT: CPT

## 2020-05-03 PROCEDURE — 97161 PT EVAL LOW COMPLEX 20 MIN: CPT

## 2020-05-03 PROCEDURE — 97535 SELF CARE MNGMENT TRAINING: CPT

## 2020-05-03 PROCEDURE — 97166 OT EVAL MOD COMPLEX 45 MIN: CPT

## 2020-05-03 PROCEDURE — 94640 AIRWAY INHALATION TREATMENT: CPT

## 2020-05-03 PROCEDURE — 94761 N-INVAS EAR/PLS OXIMETRY MLT: CPT

## 2020-05-03 PROCEDURE — 93005 ELECTROCARDIOGRAM TRACING: CPT | Performed by: INTERNAL MEDICINE

## 2020-05-03 PROCEDURE — 85027 COMPLETE CBC AUTOMATED: CPT

## 2020-05-03 RX ORDER — METOCLOPRAMIDE HYDROCHLORIDE 5 MG/ML
5 INJECTION INTRAMUSCULAR; INTRAVENOUS EVERY 24 HOURS
Status: DISCONTINUED | OUTPATIENT
Start: 2020-05-03 | End: 2020-05-06 | Stop reason: HOSPADM

## 2020-05-03 RX ORDER — METOCLOPRAMIDE HYDROCHLORIDE 5 MG/ML
5 INJECTION INTRAMUSCULAR; INTRAVENOUS ONCE
Status: DISCONTINUED | OUTPATIENT
Start: 2020-05-03 | End: 2020-05-03

## 2020-05-03 RX ORDER — OXYBUTYNIN CHLORIDE 5 MG/1
2.5 TABLET ORAL ONCE
Status: COMPLETED | OUTPATIENT
Start: 2020-05-03 | End: 2020-05-03

## 2020-05-03 RX ADMIN — BUDESONIDE AND FORMOTEROL FUMARATE DIHYDRATE 2 PUFF: 80; 4.5 AEROSOL RESPIRATORY (INHALATION) at 08:40

## 2020-05-03 RX ADMIN — SENNOSIDES 8.6 MG: 8.6 TABLET, FILM COATED ORAL at 21:31

## 2020-05-03 RX ADMIN — MEROPENEM 1 G: 1 INJECTION, POWDER, FOR SOLUTION INTRAVENOUS at 10:30

## 2020-05-03 RX ADMIN — ALBUTEROL SULFATE 2.5 MG: 2.5 SOLUTION RESPIRATORY (INHALATION) at 08:40

## 2020-05-03 RX ADMIN — METOCLOPRAMIDE 5 MG: 5 INJECTION, SOLUTION INTRAMUSCULAR; INTRAVENOUS at 18:22

## 2020-05-03 RX ADMIN — BUDESONIDE AND FORMOTEROL FUMARATE DIHYDRATE 2 PUFF: 80; 4.5 AEROSOL RESPIRATORY (INHALATION) at 20:17

## 2020-05-03 RX ADMIN — ALBUTEROL SULFATE 2.5 MG: 2.5 SOLUTION RESPIRATORY (INHALATION) at 16:16

## 2020-05-03 RX ADMIN — ATROPA BELLADONNA AND OPIUM 30 MG: 16.2; 3 SUPPOSITORY RECTAL at 15:53

## 2020-05-03 RX ADMIN — ENOXAPARIN SODIUM 40 MG: 40 INJECTION, SOLUTION INTRAVENOUS; SUBCUTANEOUS at 09:00

## 2020-05-03 RX ADMIN — MEROPENEM 1 G: 1 INJECTION, POWDER, FOR SOLUTION INTRAVENOUS at 22:12

## 2020-05-03 RX ADMIN — PRAVASTATIN SODIUM 80 MG: 20 TABLET ORAL at 21:31

## 2020-05-03 RX ADMIN — SODIUM CHLORIDE, PRESERVATIVE FREE 10 ML: 5 INJECTION INTRAVENOUS at 21:32

## 2020-05-03 RX ADMIN — LATANOPROST 1 DROP: 50 SOLUTION OPHTHALMIC at 21:31

## 2020-05-03 RX ADMIN — ALBUTEROL SULFATE 2.5 MG: 2.5 SOLUTION RESPIRATORY (INHALATION) at 20:13

## 2020-05-03 RX ADMIN — OXYBUTYNIN CHLORIDE 2.5 MG: 5 TABLET ORAL at 23:51

## 2020-05-03 ASSESSMENT — ENCOUNTER SYMPTOMS
SHORTNESS OF BREATH: 1
DIARRHEA: 0
VOMITING: 0
SINUS PRESSURE: 0
ABDOMINAL PAIN: 0
COUGH: 0
PHOTOPHOBIA: 0
BACK PAIN: 0
RHINORRHEA: 0
NAUSEA: 0
SORE THROAT: 0
CONSTIPATION: 1
CHEST TIGHTNESS: 0

## 2020-05-03 ASSESSMENT — PAIN SCALES - GENERAL
PAINLEVEL_OUTOF10: 0

## 2020-05-03 NOTE — PROGRESS NOTES
[facility] ALBERT[facility] Hematuria [R31.9] . Vitals:    05/03/20 1616   BP:    Pulse:    Resp: 20   Temp:    SpO2: 99%   . Patients home meds are   Prior to Admission medications    Medication Sig Start Date End Date Taking? Authorizing Provider   lisinopril (PRINIVIL;ZESTRIL) 10 MG tablet Take 10 mg by mouth daily    Historical Provider, MD   latanoprost (XALATAN) 0.005 % ophthalmic solution Place 1 drop into both eyes nightly    Historical Provider, MD   senna-docusate (Dotty Barbour) 8.6-50 MG per tablet Take 1 tablet by mouth daily as needed constipation    Historical Provider, MD   albuterol sulfate HFA (VENTOLIN HFA) 108 (90 Base) MCG/ACT inhaler 1 spray by Nasal route as needed 12/11/19 12/10/20  Historical Provider, MD   Azelastine-Fluticasone (DYMISTA) 137-50 MCG/ACT SUSP 1 Squirt    Historical Provider, MD   levothyroxine (SYNTHROID) 75 MCG tablet Take 75 mcg by mouth daily    Historical Provider, MD   pravastatin (PRAVACHOL) 40 MG tablet Take 1 tablet by mouth nightly  Patient taking differently: Take 80 mg by mouth nightly Changed in Nov 2019 7/31/15   Aden Salgado DO   .   Recent Surgical History:   Assessment           RR 20  Breath Sounds: dim      · Bronchodilator assessment at level  3  · Hyperinflation assessment at level   · Secretion Management assessment at level    ·   · [x]    Bronchodilator Assessment  BRONCHODILATOR ASSESSMENT SCORE  Score 0 1 2 3 4 5   Breath Sounds   []  Patient Baseline []  No Wheeze good aeration []  Faint, scattered wheezing, good aeration [x]  Expiratory Wheezing and or moderately diminished []  Insp/Exp wheeze and/or very diminished []  Insp/Exp and/ or marked distress   Respiratory Rate   []  Patient Baseline []  Less than 20 []  Less than 20 [x]  20-25 []  Greater than 25 []  Greater than 25   Peak flow % of Pred or PB [x]  NA   []  Greater than 90%  []  81-90% []  71-80% []  Less than or equal to 70%  or unable to perform []  Unable due to

## 2020-05-03 NOTE — PROGRESS NOTES
Nutrition Assessment    Type and Reason for Visit: Initial(NPO x day 5)    Nutrition Recommendations:   - Continue NPO. Monitor for restart of oral diet as able/appropriate. - Will monitor labs, for bowel function, and plan of care. Nutrition Assessment: Pt seen based on NPO diet order x day 5 of admission. Pt was on an oral diet but on 5/1 started having abdominal pain/distention with nausea - SBO vs ileus present. Pt has been NPO since. NG in place for decompression - 1,100 mL output today. Labs reviewed - Na+146 mmol/L noted. Malnutrition Assessment:  · Malnutrition Status: At risk for malnutrition  · Context: Acute illness or injury  · Findings of the 6 clinical characteristics of malnutrition (Minimum of 2 out of 6 clinical characteristics is required to make the diagnosis of moderate or severe Protein Calorie Malnutrition based on AND/ASPEN Guidelines):  1. Energy Intake-Less than or equal to 75% of estimated energy requirement, Greater than or equal to 5 days    2. Weight Loss-No significant weight loss  3. Fat Loss-No significant subcutaneous fat loss  4. Muscle Loss-No significant muscle mass loss  5. Fluid Accumulation-(Mild to Moderate fluid accumulation), Extremities    Nutrition Risk Level: High    Nutrient Needs:  · Estimated Daily Total Kcal: 1438-0222 kcal/day  · Estimated Daily Protein (g): 65-90 gm pro/day    Nutrition Diagnosis:   · Problem: Inadequate oral intake  · Etiology: related to Alteration in GI function(SBO vs Ileus)     Signs and symptoms:  as evidenced by NPO status due to medical condition    Objective Information:  · Nutrition-Focused Physical Findings: NG in place.   · Wound Type: None  · Current Nutrition Therapies:  · Oral Diet Orders: NPO   · Oral Diet intake: NPO(previously consuming more than 50% of meals)  · Anthropometric Measures:  · Ht: 4' 11\" (149.9 cm)   · Current Body Wt: 195 lb 3.2 oz (88.5 kg)  · Admission Body Wt: 193 lb 8 oz (87.8 kg)  · Ideal Body Wt: 95 lb (43.1 kg), % Ideal Body 203%  · BMI Classification: BMI 35.0 - 39.9 Obese Class II    Nutrition Interventions:   Continue NPO(Monitor for restart of oral diet.)  Continued Inpatient Monitoring, Education Not Indicated    Nutrition Evaluation:   · Evaluation: Goals set   · Goals: Start of nutrition within 48 hours.     · Monitoring: Nutrition Progression, Skin Integrity, I&O, Weight, Monitor Hemodynamic Status, Monitor Bowel Function    Electronically signed by Cirilo Nieto RD, LD on 5/3/20 at 3:57 PM EDT    Contact Number: [phone]

## 2020-05-03 NOTE — PROGRESS NOTES
[facility]   Department of Internal Medicine - Staff Internal Medicine Teaching Service        Inpatient Daily Progress Note    Date: 5/3/2020  Patient Name: Tanisha Siddiqui  MRN: [id number]  [place]: [de-identified]   Date of Admission: 2020  5:27 AM  YOB: 1935  Primary Care Physician: Genesis Guerra  Attending Physician: Nishi Bishop MD   Room: [address] [place] [address]  Number of days in the hospital: 5    Subjective   Admitting Diagnosis: Acute hemorrhagic cystitis  Chief Complaint: No chief complaint on file. Pt was seen and examined at bedside. Resting comfortably in the bed. Abdominal pain resolved. Vitals stable. Labs reviewed. No acute events overnight. No bowel movements but pt reports passing a small amount of flatus. NG tube in place. 2350ml output last 24h. Review Of Systems:  Review of Systems   Constitutional: Negative for chills, diaphoresis and fever. HENT: Negative for rhinorrhea, sinus pressure and sore throat. Eyes: Negative for photophobia. Respiratory: Positive for shortness of breath. Negative for cough and chest tightness. Cardiovascular: Negative for chest pain. Gastrointestinal: Positive for constipation. Negative for abdominal pain, diarrhea, nausea and vomiting. Genitourinary: Negative for dysuria, frequency and urgency. Musculoskeletal: Negative for back pain and neck stiffness. Skin: Negative for rash. Neurological: Negative for seizures and speech difficulty. Psychiatric/Behavioral: Negative for agitation, confusion and decreased concentration. Objective   Vital Signs:  BP (!) 118/50   Pulse 86   Temp 98.4 °F (36.9 °C)   Resp 21   Ht 4' 11\" (1.499 m)   Wt 195 lb 3.2 oz (88.5 kg)   SpO2 99%   BMI 39.43 kg/m²     Temp (24hrs), Av °F (36.7 °C), Min:96.8 °F (36 °C), Max:98.4 °F (36.9 °C)    In: 1158.3   Out: 2650 [Urine:200]  Physical Exam -  Physical Exam  Vitals signs reviewed.

## 2020-05-03 NOTE — PROGRESS NOTES
Contrast? None    Result Date: 4/29/2020  EXAMINATION: CT OF THE ABDOMEN AND PELVIS WITHOUT CONTRAST 4/29/2020 8:27 am TECHNIQUE: CT of the abdomen and pelvis was performed without the administration of intravenous contrast. Multiplanar reformatted images are provided for review. Dose modulation, iterative reconstruction, and/or weight based adjustment of the mA/kV was utilized to reduce the radiation dose to as low as reasonably achievable. COMPARISON: 08/26/2013 HISTORY: ORDERING SYSTEM PROVIDED HISTORY: elevated creatinine TECHNOLOGIST PROVIDED HISTORY: elevated creatinine rule out obstructive uropathy Reason for Exam: elevated creatinine Acuity: Unknown Type of Exam: Unknown FINDINGS: Lower Chest: Atherosclerotic plaque is noted along the aortic arch. The heart size is normal.  Coronary artery vascular calcifications are noted. There is a mild pericardial effusion. There is a defect in the left hemidiaphragm with herniation of the stomach and colon through the defect and into the left hemithorax. There is a trace left pleural effusion. Organs: Unenhanced images through the liver are normal in appearance. The gallbladder is unremarkable. The adrenal glands are unremarkable. The pancreas is atrophic. The spleen is unremarkable. Unenhanced images through the kidneys are unremarkable. There are scattered colonic diverticula. There is a left inguinal hernia containing fat and colon. GI/Bowel: The uterus is to the left of midline. There are no adnexal masses. There is a Hope catheter noted in the bladder with high attenuation material which may be related to blood Pelvis: There is a right inguinal hernia containing fat. There is a small amount of free pelvic fluid, of uncertain etiology. No adnexal masses. No inguinal or pelvic sidewall adenopathy. Peritoneum/Retroperitoneum: Atherosclerotic plaque is noted in the aorta and its branch vessels. No retroperitoneal adenopathy. Bones/Soft Tissues:  There is Osteopenia. Degenerative changes in the shoulders and spine. The patient's stomach overlies the left lower 2/3 of the chest, which has increased from the previous known hiatal hernia seen in 2013. This could be related to worsening of the patient's hiatal hernia, though a component of an elevated left hemidiaphragm is in the differential.  This would be best assessed with a CT scan of the chest if clinically concerned. Cardiomegaly. COPD, with some bronchial thickening noted which may be related to superimposed acute bronchitis. Vl Dup Lower Extremity Venous Bilateral    Result Date: 4/29/2020    [facility]  Vascular Lower Extremities DVT Study Procedure   Patient Name  Evette Lyon     Date of Study         04/29/2020                MACIEJ RAZA   Date of Birth 1935  Gender                Female   Age           80 year(s)  Race                     Room Number   2020   Corporate ID  [id number]  #   Patient Kenzie  [de-identified]  #   MR #          [id number]     Sonographer           Forrest Villela RVT, RDMS   Accession #   [id number]   Interpreting          [address] [place] [address]                            Physician   Referring                 Referring Physician   Katie Ramirez  Nurse  Practitioner  Procedure Type of Study:   Veins: Lower Extremities DVT Study, Venous Scan Lower Bilateral.  Indications for Study:Swelling. Patient Status: In Patient. Conclusions   Summary   No evidence of superficial or deep venous thrombosis in both lower  extremities.    Signature   ----------------------------------------------------------------  Electronically signed by Forrest Villela RVT, RDMS(Sonographer) on 04/29/2020 06:36 PM  ----------------------------------------------------------------   ----------------------------------------------------------------  Electronically signed by Glenys Modena Reyes,Arthur(Interpreting  physician) on 04/29/2020 06:54 PM ----------------------------------------------------------------  Findings:   Right Impression:                    Left Impression:  The common femoral, femoral,         The common femoral, femoral,  popliteal and tibial veins           popliteal and tibial veins  demonstrate normal compressibility   demonstrate normal compressibility  and augmentation. and augmentation. Normal compressibility of the great  Normal compressibility of the great  saphenous vein. saphenous vein. Normal compressibility of the small  Normal compressibility of the small  saphenous vein. saphenous vein. Risk Factors History +--------------------------+---------------------+-------------------------+ ! Diagnosis                 ! Date                 ! Comments                 ! +--------------------------+---------------------+-------------------------+ ! Other                     !                     !COVID 19                 ! +--------------------------+---------------------+-------------------------+   - The patient's risk factor(s) include: dyslipidemia and arterial     hypertension. Velocities are measured in cm/s ; Diameters are measured in cm Right Lower Extremities DVT Study Measurements Right 2D Measurements +------------------------------------+----------+---------------+----------+ ! Location                            ! Visualized! Compressibility! Thrombosis! +------------------------------------+----------+---------------+----------+ ! Common Femoral                      !Yes       ! Yes            ! None      ! +------------------------------------+----------+---------------+----------+ ! Prox Femoral                        !Yes       ! Yes            ! None      ! +------------------------------------+----------+---------------+----------+ ! Mid Femoral                         !Yes       ! Yes            ! None      ! +------------------------------------+----------+---------------+----------+ ! Dist Femoral                        !Yes       ! Yes            ! None      ! +------------------------------------+----------+---------------+----------+ ! Popliteal                           !Yes       ! Yes            ! None      ! +------------------------------------+----------+---------------+----------+ ! Sapheno Femoral Junction            ! Yes       ! Yes            ! None      ! +------------------------------------+----------+---------------+----------+ ! PTV                                 ! Yes       ! Yes            ! None      ! +------------------------------------+----------+---------------+----------+ ! Peroneal                            !Yes       ! Yes            ! None      ! +------------------------------------+----------+---------------+----------+ ! Gastroc                             ! Yes       ! Yes            ! None      ! +------------------------------------+----------+---------------+----------+ ! GSV Thigh                           ! Yes       ! Yes            ! None      ! +------------------------------------+----------+---------------+----------+ ! GSV Knee                            ! Yes       ! Yes            ! None      ! +------------------------------------+----------+---------------+----------+ ! GSV Ankle                           ! Yes       ! Yes            ! None      ! +------------------------------------+----------+---------------+----------+ ! SSV                                 ! Yes       ! Yes            ! None      ! +------------------------------------+----------+---------------+----------+ Right Doppler Measurements +---------------------------+------+------+--------------------------------+ ! Location                   ! Signal!Reflux! Reflux (msec)                   ! +---------------------------+------+------+--------------------------------+ ! Common Femoral             !Phasic!      ! !Yes       !Yes            ! None      ! +------------------------------------+----------+---------------+----------+ ! GSV Thigh                           ! Yes       ! Yes            ! None      ! +------------------------------------+----------+---------------+----------+ ! GSV Knee                            ! Yes       ! Yes            ! None      ! +------------------------------------+----------+---------------+----------+ ! GSV Ankle                           ! Yes       ! Yes            ! None      ! +------------------------------------+----------+---------------+----------+ ! SSV                                 ! Yes       ! Yes            ! None      ! +------------------------------------+----------+---------------+----------+ Left Doppler Measurements +---------------------------+------+------+--------------------------------+ ! Location                   ! Signal!Reflux! Reflux (msec)                   ! +---------------------------+------+------+--------------------------------+ ! Common Femoral             !Phasic!      !                                ! +---------------------------+------+------+--------------------------------+ ! Prox Femoral               !Phasic!      !                                ! +---------------------------+------+------+--------------------------------+ ! Popliteal                  !Phasic!      !                                ! 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  Coli UTI [A41.9, R65.20] 78/48/0664    Neutrophilic leukocytosis

## 2020-05-03 NOTE — PROGRESS NOTES
Physical Therapy    Facility/Department: [facility] 4B STEPDOWN  Initial Assessment    NAME: Sandor Davison  : 1935  MRN: [id number]    Date of Service: 5/3/2020  Sandor Davison is a  80 y.o.  female the past medical history of hypertension ASCVD hypothyroidism presents with Hematuria and Dysuria  Discharge Recommendations:  Further therapy recommended at discharge. PT Equipment Recommendations  Equipment Needed: (TBD)    Assessment    Pt cooperative, motivated, normally very active per pt. Body structures, Functions, Activity limitations: Decreased functional mobility ; Decreased endurance;Decreased balance  Prognosis: Good  Decision Making: Medium Complexity  PT Education: Goals;PT Role;Plan of Care  Barriers to Learning: none  REQUIRES PT FOLLOW UP: Yes  Activity Tolerance  Activity Tolerance: Patient limited by fatigue;Patient limited by endurance       Patient Diagnosis(es): There were no encounter diagnoses. has a past medical history of Coronary artery disease, ESBL (extended spectrum beta-lactamase) producing bacteria infection, HH (hiatus hernia), Hyperkalemia, Hyperlipidemia, Hypertension, Hypotension, and Thyroid disease. has a past surgical history that includes Total knee arthroplasty (Right); bladder suspension; angioplasty; and Cataract removal with implant (Bilateral).     Restrictions  Restrictions/Precautions  Restrictions/Precautions: Up as Tolerated, Fall Risk, General Precautions(NG to suction)  Required Braces or Orthoses?: No  Position Activity Restriction  Other position/activity restrictions: ambulate patient   Vision/Hearing  Vision: Impaired  Vision Exceptions: Wears glasses for reading  Hearing: Within functional limits     Subjective  General  Patient assessed for rehabilitation services?: Yes  Response To Previous Treatment: Not applicable  Family / Caregiver Present: No  Follows Commands: Within Functional Limits  Pain Screening  Patient Currently in Pain: assistance  Stand Pivot Transfers: Minimal Assistance  Ambulation  Ambulation?: Yes  Ambulation 1  Surface: level tile  Device: Rolling Walker  Assistance: Minimal assistance  Gait Deviations: Slow Blanche; Increased YARI; Decreased step length  Distance: ~10 steps bed to chair  Stairs/Curb  Stairs?: No     Balance  Posture: Good  Sitting - Static: Good  Sitting - Dynamic: Good  Standing - Static: Fair  Standing - Dynamic: Fair  Other exercises  Other exercises 1: ankle pumps, LE AROM supine     Plan   Plan  Times per week: 5-6 visits weekly  Times per day: Daily  Current Treatment Recommendations: Strengthening, ROM, Balance Training, Functional Mobility Training, Transfer Training, Endurance Training, Gait Training, Stair training  Safety Devices  Type of devices: Call light within reach, Gait belt, Patient at risk for falls, Left in bed, Nurse notified  Restraints  Initially in place: No    AM-PAC Score  AM-PAC Inpatient Mobility Raw Score : 16 (05/03/20 1516)  AM-PAC Inpatient T-Scale Score : 40.78 (05/03/20 1516)  Mobility Inpatient CMS 0-100% Score: 54.16 (05/03/20 1516)  Mobility Inpatient CMS G-Code Modifier : CK (05/03/20 1516)          Goals  Short term goals  Time Frame for Short term goals: 12 visits  Short term goal 1: independent bed mobility  Short term goal 2: independent transfers  Short term goal 3: independent gait without device x 100'  Short term goal 4: independent stair ambulation x 1 step with 1 HR   Patient Goals   Patient goals : return home independently       Therapy Time   Individual Concurrent Group Co-treatment   Time In 1430         Time Out 1500         Minutes 30                 Lily Bush PT

## 2020-05-03 NOTE — PROGRESS NOTES
Restrictions  Restrictions/Precautions  Restrictions/Precautions: Up as Tolerated  Required Braces or Orthoses?: No  Position Activity Restriction  Other position/activity restrictions: ambulate patient     Subjective   General  Patient assessed for rehabilitation services?: Yes  Family / Caregiver Present: No  General Comment  Comments: RN ok'd patient for OT evaluation. Pt pleasant and cooperative throughout.    Patient Currently in Pain: No  Vital Signs  Temp: 97.5 °F (36.4 °C)  Temp Source: Oral  Heart Rate Source: Monitor  BP: (!) 111/41  BP Location: Left upper arm  BP Upper/Lower: Upper  MAP (mmHg): (!) 57  Patient Position: Right side  Level of Consciousness: Alert  Patient Currently in Pain: No  Oxygen Therapy  Pulse Oximeter Device Mode: Continuous  Pulse Oximeter Device Location: Finger  O2 Device: Nasal cannula  O2 Flow Rate (L/min): 2 L/min    Social/Functional History  Social/Functional History  Lives With: Spouse  Type of Home: House  Home Layout: One level  Home Access: Stairs to enter with rails  Entrance Stairs - Number of Steps: 1 4 inch step   Entrance Stairs - Rails: Right  Bathroom Shower/Tub: Walk-in shower, Shower chair with back  Bathroom Toilet: Standard  Bathroom Equipment: Grab bars in shower, Grab bars around toilet  Bathroom Accessibility: Accessible  Receives Help From: Family  ADL Assistance: Independent  Homemaking Assistance: Independent  Homemaking Responsibilities: Yes  Meal Prep Responsibility: Primary  Laundry Responsibility: Primary  Cleaning Responsibility: Primary  Shopping Responsibility: Primary  Ambulation Assistance: Independent  Transfer Assistance: Independent  Active : Yes  Mode of Transportation: Car, Truck  Occupation: Retired  Type of occupation:  - retired 2 years ago   [address]: [place]   Additional Comments: reports  is in good health and able to assist        Objective   Vision: Impaired  Vision Exceptions: Wears glasses for reading  Hearing: Within functional limits    Orientation  Overall Orientation Status: Within Functional Limits     Balance  Sitting Balance: Contact guard assistance(EOB for ~4 min; OT facilitated improved balance instructing hand placement )  Standing Balance: Contact guard assistance(Min A initially EOB progressing to CGA as time progressed; OT instructed patient on widening YARI to increase balance with good return )  Functional Mobility  Functional - Mobility Device: Rolling Walker  Activity: Other  Assist Level: Contact guard assistance  Functional Mobility Comments: from EOB to bedside recliner   ADL  Feeding: Independent  Grooming: Independent; Increased time to complete  UE Bathing: Contact guard assistance; Increased time to complete  LE Bathing: Moderate assistance; Increased time to complete  UE Dressing: Contact guard assistance; Increased time to complete  LE Dressing: Moderate assistance; Increased time to complete  Toileting: Minimal assistance; Increased time to complete  Tone RUE  RUE Tone: Normotonic  Tone LUE  LUE Tone: Normotonic  Coordination  Movements Are Fluid And Coordinated: Yes     Bed mobility  Supine to Sit: Moderate assistance  Scooting: Maximal assistance  Comment: Retired to bedside recliner; verbal and tactile cues for sequencing required;   Transfers  Sit to stand:  Moderate assistance  Stand to sit: Contact guard assistance  Transfer Comments: Pt requiring ~10 sec to adjust balance; OT instructed on technique to increase YARI to increase balance with good return; improving standing balance      Cognition  Overall Cognitive Status: Exceptions  Arousal/Alertness: Appropriate responses to stimuli  Safety Judgement: Decreased awareness of need for assistance  Initiation: Requires cues for some  Sequencing: Requires cues for some        Sensation  Overall Sensation Status: WFL      LUE AROM : WFL  Left Hand AROM: WFL  RUE AROM : WFL  Right Hand AROM: WFL  LUE Strength  Gross LUE Strength: WFL  L Hand General: 4+/5  RUE Strength  Gross RUE Strength: WFL  R Hand General: 4+/5              Plan   Plan  Times per week: 3-5x/wk     AM-PAC Score        AM-PAC Inpatient Daily Activity Raw Score: 18 (05/03/20 1312)  AM-PAC Inpatient ADL T-Scale Score : 38.66 (05/03/20 1312)  ADL Inpatient CMS 0-100% Score: 46.65 (05/03/20 1312)  ADL Inpatient CMS G-Code Modifier : CK (05/03/20 1312)    Goals  Short term goals  Time Frame for Short term goals: Patient will, by discharge  Short term goal 1: demonstrate UB self-cares independently   Short term goal 2: demonstrate LB self-cares at Mod I using AE PRN   Short term goal 3: demonstrate functional transfers/mobility using LRD at Supervision to engage in ADLs   Short term goal 4: demonstrate ~10 min of dynamic standing tolerance to engage in ADLs safely at Supervision  Short term goal 5: demonstrate ~30 min of functional activity tolerance implementing energy conservation strategies PRN        Therapy Time   Individual Concurrent Group Co-treatment   Time In 1150         Time Out 1224         Minutes 34         Timed Code Treatment Minutes: 25 Minutes       Si Been, OTR/L

## 2020-05-03 NOTE — PROGRESS NOTES
[facility] - Progress Note  Today's Date and Time: 5/3/2020, 12:38 PM    Impression :   · Hematuria  · UTI with E coli ESBL +  · Hx recurrent UTIs with prior E coli ESBL + infection  · Hx urinary incontinence with prior bladder suspension surgery in 1970's  · Hx CAD on anticoagulants    Recommendations:   · Meropenem 1 gm Iv q 8 hr  · ID wise OK to proceed with cystoscopy at the discretion of Urology  · Will test for COVID in anticipation of possible cystoscopy but degree of suspicion is low - COVID negative 4-29  ·     Medical Decision Making/Summary/Discussion:5/3/2020     · 79 yo woman with history of ESBL ecoli UTI  · Developed hailey hematuria with associated dysuria. · Admitted for further care, CBI in place, started on meropenem  · COVID testing is negative, Urine culture showing ESBL Ecoli   Infection Control Recommendations   · Allison Precautions  · Contact Isolation ESBL +    Antimicrobial Stewardship Recommendations     · Simplification of therapy  · Targeted therapy    Coordination of Outpatient Care:   · Estimated Length of IV antimicrobials:TBD Start 4-29  · Patient will need Midline Catheter Insertion: No  · Patient will need PICC line Insertion:No  · Patient will need: Home IV , [place],  SNF,  LTAC:TBD  · Patient will need outpatient wound care:No    Chief complaint/reason for consultation:   E coli ESBL + UTI    History of Present Illness:   Ruperto Ibarra is a 80y.o.-year-old  female who was initially admitted on 4/28/2020. Patient seen at the request of Estill Gosselin. INITIAL HISTORY:    Patient transferred from [facility] where she had presented with gross hematuria on 4-27-20. She has a Hx of prior urinary incontinence after pregnancies. Had undergone bladder suspension surgery in the 1970's. She reports Hx of recurrent UTI's including infections with E coli ESBL +.   On the current admission she developed hematuria on 12-26-20 which progressed from a few drops into hailey hematuria by the next morning. She had experienced symptoms of burning with urination prior to the hematuria suggestive of UTI. She is on Plavix and ASA because of CAD. Urine culture has grown E coli ESBL +. The patient is receiving meropenem. She is currently receiving bladder irrigations to control the bleeding. A cystoscopy may be necessary. She gives no Hx to suggest COVID. Her CXR shows a large Lt hiatal hernia and evidence of COPD. No infiltrates to suggest pneumonia. Abd CT 4-29-20: Hiatal hernia and vascular congestion. CURRENT EXAMINATION: 5/3/2020     Pt AAO  Denies chills, fevers, SOB  Seen on 3L O2 NC    Pt Hgb has dropped 4gm since admission. 12.9-->8.1  COVID testing is negative, O2 requirement and weakness is likely related to blood loss anemia    Hope in place, CBI has been turned off. Hematuria has stopped, no clots noted  Per urology notes, plan for outpatient cystoscopy. Afebrile  Mild tachycardia, VS otherwise stable  NG tube in place, bilious fluid removed  Patient evaluated by surgery because of constipation  Upper GI with water soluble contrast and small bowel series on 5-2-20: Large portion of stomach in the chest with relative stasis of contrast because of hiatal hernia. Ileus> No definite pasage into colon even on delayed films of 5-3-20      Labs, X rays reviewed: 5/3/2020    BUN: 33->34  Cr:1.66->1.23    WBC:17.8->15.6  Hb:12.9->10.0->8.8->8. 1  Plat: 245->226    Ferritin 141  ESR 91    4-29 COVID negative     Cultures:  Urine:  · 4-27-20 E coli ESBL +  Blood:  · 4-29 x 2 no growth to date  Sputum :  ·   Wound:  ·     Discussed with patient, RN. I have personally reviewed the past medical history, past surgical history, medications, social history, and family history, and I have updated the database accordingly.   Past Medical History:     Past Medical History:   Diagnosis Date    Coronary artery disease     ESBL (extended spectrum beta-lactamase) producing bacteria infection 6/30/2015    E.  Coli urine    HH (hiatus hernia)     Hyperkalemia 4/29/2020    Hyperlipidemia     Hypertension     Hypotension 4/29/2020    Thyroid disease        Past Surgical  History:     Past Surgical History:   Procedure Laterality Date    ANGIOPLASTY      BLADDER SUSPENSION      CATARACT REMOVAL WITH IMPLANT Bilateral     TOTAL KNEE ARTHROPLASTY Right        Medications:      albuterol  2.5 mg Nebulization 4x daily    docusate sodium  100 mg Oral Daily    senna  1 tablet Oral Nightly    enoxaparin  40 mg Subcutaneous Daily    budesonide-formoterol  2 puff Inhalation BID    meropenem  1 g Intravenous Q12H    latanoprost  1 drop Both Eyes Nightly    levothyroxine  75 mcg Oral Daily    pravastatin  80 mg Oral Nightly    sodium chloride flush  10 mL Intravenous 2 times per day    polyethylene glycol  17 g Oral Daily       Social History:     Social History     Socioeconomic History    Marital status:      Spouse name: Not on file    Number of children: Not on file    Years of education: Not on file    Highest education level: Not on file   Occupational History    Not on file   Social Needs    Financial resource strain: Not on file    Food insecurity     Worry: Not on file     Inability: Not on file    Transportation needs     Medical: Not on file     Non-medical: Not on file   Tobacco Use    Smoking status: Never Smoker    Smokeless tobacco: Never Used   Substance and Sexual Activity    Alcohol use: No    Drug use: No    Sexual activity: Not on file   Lifestyle    Physical activity     Days per week: Not on file     Minutes per session: Not on file    Stress: Not on file   Relationships    Social connections     Talks on phone: Not on file     Gets together: Not on file     Attends Buddhist service: Not on file     Active member of club or organization: Not on file     Attends meetings of clubs or 02  Eyes: Pupils equal, round, reactive to light and accommodation; extraocular movements intact; sclera anicteric; conjunctivae pink. No embolic phenomena. ENT: Oropharynx clear, without erythema, exudate, or thrush. No tenderness of sinuses. Mouth/throat: mucosa pink and moist. No lesions. Neck:Supple, without lymphadenopathy. Thyroid normal, No bruits. Pulmonary/Chest: Distant sounds. Clear to auscultation, without wheezes, rales, or rhonchi. No dullness to percussion. Cardiovascular: Regular rate and rhythm without murmurs, rubs, or gallops. Abdomen: Soft, non tender. Bowel sounds normal. No organomegaly  All four Extremities: No cyanosis, clubbing, edema, or effusions. Neurologic: No gross sensory or motor deficits. Skin: Warm and dry with good turgor. Signs of peripheral arterial insufficiency. No ulcerations. No open wounds. Medical Decision Making -Laboratory:   I have independently reviewed/ordered the following labs:    CBC with Differential:   Recent Labs     05/01/20  1852 05/02/20  1038 05/03/20  0649   WBC 15.2* 12.4* 11.5*   HGB 9.3* 8.1* 8.6*   HCT 30.1* 26.4* 28.2*    273 277   LYMPHOPCT 8*  --   --    MONOPCT 7  --   --      BMP:   Recent Labs     05/02/20  1038 05/03/20  0649    146*   K 4.6 4.3    107   CO2 21 25   BUN 36* 33*   CREATININE 0.89 0.94*     Hepatic Function Panel:   No results for input(s): PROT, LABALBU, BILIDIR, IBILI, BILITOT, ALKPHOS, ALT, AST in the last 72 hours. No results for input(s): RPR in the last 72 hours. No results for input(s): HIV in the last 72 hours. No results for input(s): BC in the last 72 hours.   Lab Results   Component Value Date    MUCUS NOT REPORTED 04/29/2020    RBC 2.69 05/03/2020    TRICHOMONAS NOT REPORTED 04/29/2020    WBC 11.5 05/03/2020    YEAST NOT REPORTED 04/29/2020    TURBIDITY TURBID 04/29/2020     Lab Results   Component Value Date    CREATININE 0.94 05/03/2020    GLUCOSE 77 05/03/2020       Medical Decision Making-Imaging:     EXAMINATION:   ONE XRAY VIEW OF THE CHEST       4/29/2020 6:54 am       COMPARISON:   08/03/2013       HISTORY:   ORDERING SYSTEM PROVIDED HISTORY: wheezing   TECHNOLOGIST PROVIDED HISTORY:   wheezing   Reason for Exam: wheezing. port upright at 625am       FINDINGS:   Cardiomegaly. Mabel Harper is an increased degree of lucency seen within the   patient's left chest related to intragastric air, which could be related to a   combination of a known hiatal hernia as well as elevated left hemidiaphragm   or worsening of hiatal hernia.  Underlying COPD.  Bronchial thickening is   identified.  No pneumothorax.  Osteopenia.  Degenerative changes in the   shoulders and spine.           Impression   The patient's stomach overlies the left lower 2/3 of the chest, which has   increased from the previous known hiatal hernia seen in 2013.  This could be   related to worsening of the patient's hiatal hernia, though a component of an   elevated left hemidiaphragm is in the differential.  This would be best   assessed with a CT scan of the chest if clinically concerned.       Cardiomegaly.       COPD, with some bronchial thickening noted which may be related to   superimposed acute bronchitis. Medical Decision [id number]:     4/29/2020  1:11 AM - AlexYaima mendez Incoming Lab Results From [facility]     Specimen Information: Urine, clean catch        Component Collected Lab   Specimen Description 04/27/2020  9:50 AM [facility]- [address] Lab   . CLEAN CATCH URINE    Special Requests 04/27/2020  9:50 AM [facility]- [address] Lab   NOT REPORTED    Culture Abnormal  04/27/2020  9:50 AM [address] >[id number] CFU/ML THIS ORGANISM IS AN EXTENDED-SPECTRUM BETA-LACTAMASE  AND RESISTANCE TO THERAPY WITH PENICILLINS, CEPHALOSPORINS AND AZTREONAM IS EXPECTED.  THESE ORGANISMS GENERALLY REMAIN SUSCEPTIBLE TO CARBAPENEMS.  CONSIDER ID CONSULTATION.    Testing Performed By Lab - Abbreviation Name Director Address Valid Date Range   [address] [address] 08/30/17 0801-Present   [address] [place] [address] LAB Bre Luna MD [address] [facility] [id number] 11/17/17 1521-Present   Susceptibility     Escherichia coli (1)     Antibiotic Interpretation CORRINA Status    amikacin   Final     NOT REPORTED   ampicillin Resistant  Final     >=32  RESISTANT   ampicillin-sulbactam   Final     NOT REPORTED   aztreonam Resistant  Final     <=1  RESISTANT   ceFAZolin Resistant  Final     >=64  RESISTANT   ceFAZolin Resistant Cefazolin sensitivity results can be used to predict the effectiveness of oral cephalosporins (eg.  Cephalexin) in uncomplicated Urinary Tract Infections due to E. coli, K. pneumoniae, and P. mirabilis Final    cefepime Resistant  Final     <=1  RESISTANT   cefTRIAXone Resistant  Final     >=64  RESISTANT   ciprofloxacin Resistant  Final     >=4  RESISTANT   ertapenem   Final     NOT REPORTED   Confirmatory Extended Spectrum Beta-Lactamase Positive POSITIVE Final    gentamicin Sensitive  Final     <=1  SUSCEPTIBLE   meropenem Sensitive  Final     <=0.25  SUSCEPTIBLE   nitrofurantoin Sensitive  Final     <=16  SUSCEPTIBLE   tigecycline   Final     NOT REPORTED   tobramycin Sensitive  Final     <=1  SUSCEPTIBLE   trimethoprim-sulfamethoxazole Resistant  Final     >=320  RESISTANT   piperacillin-tazobactam Resistant  Final     <=4  RESISTANT       Medical Decision Making-Other:     Note:  · Labs, medications, radiologic studies were reviewed with personal review of films  · Moderate Large amounts of data were reviewed  · Discussed with nursing Staff, Discharge planner  · Infection Control and Prevention measures reviewed  · All prior entries were reviewed  · Administer medications as ordered  · Prognosis: Good  · Discharge planning reviewed  · Follow up as

## 2020-05-04 ENCOUNTER — APPOINTMENT (OUTPATIENT)
Dept: GENERAL RADIOLOGY | Age: 85
DRG: 871 | End: 2020-05-04
Attending: UROLOGY
Payer: MEDICARE

## 2020-05-04 LAB
ANION GAP SERPL CALCULATED.3IONS-SCNC: 10 MMOL/L (ref 9–17)
BUN BLDV-MCNC: 28 MG/DL (ref 8–23)
BUN/CREAT BLD: ABNORMAL (ref 9–20)
CALCIUM SERPL-MCNC: 8.1 MG/DL (ref 8.6–10.4)
CHLORIDE BLD-SCNC: 107 MMOL/L (ref 98–107)
CO2: 29 MMOL/L (ref 20–31)
CREAT SERPL-MCNC: 0.84 MG/DL (ref 0.5–0.9)
EKG ATRIAL RATE: 89 BPM
EKG P AXIS: 41 DEGREES
EKG P-R INTERVAL: 150 MS
EKG Q-T INTERVAL: 372 MS
EKG QRS DURATION: 72 MS
EKG QTC CALCULATION (BAZETT): 452 MS
EKG R AXIS: 49 DEGREES
EKG T AXIS: 40 DEGREES
EKG VENTRICULAR RATE: 89 BPM
GFR AFRICAN AMERICAN: >60 ML/MIN
GFR NON-AFRICAN AMERICAN: >60 ML/MIN
GFR SERPL CREATININE-BSD FRML MDRD: ABNORMAL ML/MIN/{1.73_M2}
GFR SERPL CREATININE-BSD FRML MDRD: ABNORMAL ML/MIN/{1.73_M2}
GLUCOSE BLD-MCNC: 76 MG/DL (ref 70–99)
HCT VFR BLD CALC: 23.3 % (ref 36.3–47.1)
HEMOGLOBIN: 7.4 G/DL (ref 11.9–15.1)
MCH RBC QN AUTO: 31.6 PG (ref 25.2–33.5)
MCHC RBC AUTO-ENTMCNC: 31.8 G/DL (ref 28.4–34.8)
MCV RBC AUTO: 99.6 FL (ref 82.6–102.9)
NRBC AUTOMATED: 0.7 PER 100 WBC
PDW BLD-RTO: 14.1 % (ref 11.8–14.4)
PLATELET # BLD: 279 K/UL (ref 138–453)
PMV BLD AUTO: 8.3 FL (ref 8.1–13.5)
POTASSIUM SERPL-SCNC: 4 MMOL/L (ref 3.7–5.3)
RBC # BLD: 2.34 M/UL (ref 3.95–5.11)
SODIUM BLD-SCNC: 146 MMOL/L (ref 135–144)
WBC # BLD: 10.2 K/UL (ref 3.5–11.3)

## 2020-05-04 PROCEDURE — 6360000002 HC RX W HCPCS: Performed by: STUDENT IN AN ORGANIZED HEALTH CARE EDUCATION/TRAINING PROGRAM

## 2020-05-04 PROCEDURE — 99232 SBSQ HOSP IP/OBS MODERATE 35: CPT | Performed by: INTERNAL MEDICINE

## 2020-05-04 PROCEDURE — 99232 SBSQ HOSP IP/OBS MODERATE 35: CPT | Performed by: SURGERY

## 2020-05-04 PROCEDURE — 97530 THERAPEUTIC ACTIVITIES: CPT

## 2020-05-04 PROCEDURE — 6370000000 HC RX 637 (ALT 250 FOR IP): Performed by: STUDENT IN AN ORGANIZED HEALTH CARE EDUCATION/TRAINING PROGRAM

## 2020-05-04 PROCEDURE — 36415 COLL VENOUS BLD VENIPUNCTURE: CPT

## 2020-05-04 PROCEDURE — 97110 THERAPEUTIC EXERCISES: CPT

## 2020-05-04 PROCEDURE — 97116 GAIT TRAINING THERAPY: CPT

## 2020-05-04 PROCEDURE — 94761 N-INVAS EAR/PLS OXIMETRY MLT: CPT

## 2020-05-04 PROCEDURE — 94640 AIRWAY INHALATION TREATMENT: CPT

## 2020-05-04 PROCEDURE — 2580000003 HC RX 258: Performed by: STUDENT IN AN ORGANIZED HEALTH CARE EDUCATION/TRAINING PROGRAM

## 2020-05-04 PROCEDURE — 2060000000 HC ICU INTERMEDIATE R&B

## 2020-05-04 PROCEDURE — 6360000002 HC RX W HCPCS: Performed by: INTERNAL MEDICINE

## 2020-05-04 PROCEDURE — 74018 RADEX ABDOMEN 1 VIEW: CPT

## 2020-05-04 PROCEDURE — 2700000000 HC OXYGEN THERAPY PER DAY

## 2020-05-04 PROCEDURE — 97535 SELF CARE MNGMENT TRAINING: CPT

## 2020-05-04 PROCEDURE — 80048 BASIC METABOLIC PNL TOTAL CA: CPT

## 2020-05-04 PROCEDURE — 85027 COMPLETE CBC AUTOMATED: CPT

## 2020-05-04 RX ORDER — DOCUSATE SODIUM 100 MG/1
100 CAPSULE, LIQUID FILLED ORAL 2 TIMES DAILY
Status: DISCONTINUED | OUTPATIENT
Start: 2020-05-04 | End: 2020-05-06 | Stop reason: HOSPADM

## 2020-05-04 RX ORDER — MAGNESIUM HYDROXIDE 1200 MG/15ML
1000 LIQUID ORAL ONCE
Status: COMPLETED | OUTPATIENT
Start: 2020-05-04 | End: 2020-05-04

## 2020-05-04 RX ORDER — MAGNESIUM HYDROXIDE 1200 MG/15ML
1000 LIQUID ORAL CONTINUOUS
Status: DISCONTINUED | OUTPATIENT
Start: 2020-05-04 | End: 2020-05-04

## 2020-05-04 RX ADMIN — LEVOTHYROXINE SODIUM 75 MCG: 75 TABLET ORAL at 09:31

## 2020-05-04 RX ADMIN — BUDESONIDE AND FORMOTEROL FUMARATE DIHYDRATE 2 PUFF: 80; 4.5 AEROSOL RESPIRATORY (INHALATION) at 20:39

## 2020-05-04 RX ADMIN — ENOXAPARIN SODIUM 40 MG: 40 INJECTION, SOLUTION INTRAVENOUS; SUBCUTANEOUS at 09:31

## 2020-05-04 RX ADMIN — SENNOSIDES 8.6 MG: 8.6 TABLET, FILM COATED ORAL at 21:43

## 2020-05-04 RX ADMIN — BUDESONIDE AND FORMOTEROL FUMARATE DIHYDRATE 2 PUFF: 80; 4.5 AEROSOL RESPIRATORY (INHALATION) at 08:07

## 2020-05-04 RX ADMIN — MEROPENEM 1 G: 1 INJECTION, POWDER, FOR SOLUTION INTRAVENOUS at 22:00

## 2020-05-04 RX ADMIN — ALBUTEROL SULFATE 2.5 MG: 2.5 SOLUTION RESPIRATORY (INHALATION) at 15:33

## 2020-05-04 RX ADMIN — ATROPA BELLADONNA AND OPIUM 30 MG: 16.2; 3 SUPPOSITORY RECTAL at 02:00

## 2020-05-04 RX ADMIN — DOCUSATE SODIUM 100 MG: 100 CAPSULE, LIQUID FILLED ORAL at 21:43

## 2020-05-04 RX ADMIN — LATANOPROST 1 DROP: 50 SOLUTION OPHTHALMIC at 21:43

## 2020-05-04 RX ADMIN — METOCLOPRAMIDE 5 MG: 5 INJECTION, SOLUTION INTRAMUSCULAR; INTRAVENOUS at 17:45

## 2020-05-04 RX ADMIN — ALBUTEROL SULFATE 2.5 MG: 2.5 SOLUTION RESPIRATORY (INHALATION) at 07:57

## 2020-05-04 RX ADMIN — ALBUTEROL SULFATE 2.5 MG: 2.5 SOLUTION RESPIRATORY (INHALATION) at 20:39

## 2020-05-04 RX ADMIN — ALBUTEROL SULFATE 2.5 MG: 2.5 SOLUTION RESPIRATORY (INHALATION) at 11:35

## 2020-05-04 RX ADMIN — PRAVASTATIN SODIUM 80 MG: 20 TABLET ORAL at 21:43

## 2020-05-04 RX ADMIN — SODIUM CHLORIDE 1000 ML: 900 IRRIGANT IRRIGATION at 04:39

## 2020-05-04 RX ADMIN — DOCUSATE SODIUM 100 MG: 100 CAPSULE, LIQUID FILLED ORAL at 09:31

## 2020-05-04 RX ADMIN — MEROPENEM 1 G: 1 INJECTION, POWDER, FOR SOLUTION INTRAVENOUS at 11:05

## 2020-05-04 ASSESSMENT — ENCOUNTER SYMPTOMS
RHINORRHEA: 0
CONSTIPATION: 1
DIARRHEA: 0
ABDOMINAL PAIN: 0
SORE THROAT: 0
CHEST TIGHTNESS: 0
NAUSEA: 0
SINUS PRESSURE: 0
BACK PAIN: 0
PHOTOPHOBIA: 0
VOMITING: 0
SHORTNESS OF BREATH: 1
COUGH: 0

## 2020-05-04 ASSESSMENT — PAIN SCALES - GENERAL
PAINLEVEL_OUTOF10: 0
PAINLEVEL_OUTOF10: 0

## 2020-05-04 ASSESSMENT — PAIN DESCRIPTION - PROGRESSION
CLINICAL_PROGRESSION: NOT CHANGED
CLINICAL_PROGRESSION: NOT CHANGED

## 2020-05-04 NOTE — PROGRESS NOTES
HISTORY:    Patient transferred from [address] [place] [address] where she had presented with gross hematuria on 4-27-20. She has a Hx of prior urinary incontinence after pregnancies. Had undergone bladder suspension surgery in the 1970's. She reports Hx of recurrent UTI's including infections with E coli ESBL +. On the current admission she developed hematuria on 12-26-20 which progressed from a few drops into hailey hematuria by the next morning. She had experienced symptoms of burning with urination prior to the hematuria suggestive of UTI. She is on Plavix and ASA because of CAD. Urine culture has grown E coli ESBL +. The patient is receiving meropenem. She is currently receiving bladder irrigations to control the bleeding. A cystoscopy may be necessary. She gives no Hx to suggest COVID. Her CXR shows a large Lt hiatal hernia and evidence of COPD. No infiltrates to suggest pneumonia. Abd CT 4-29-20: Hiatal hernia and vascular congestion. Pt Hgb has dropped 4gm since admission. 12.9-->8.1  COVID testing is negative, O2 requirement and weakness is likely related to blood loss anemia    Jung in place, CBI has been turned off. Hematuria has stopped, no clots noted  Per urology notes, plan for outpatient cystoscopy. Developed an ileus and required NG placement 5-1  Patient evaluated by surgery because of constipation  Upper GI with water soluble contrast and small bowel series on 5-2-20: Large portion of stomach in the chest with relative stasis of contrast because of hiatal hernia. Ileus> No definite pasage into colon even on delayed films of 5-3-20    5-4 now with bladder spasms and clogging of the jung requiring irrigation.   Ileus continues per Ab xray 5-4      CURRENT EXAMINATION: 5/4/2020     Pt AAO  Denies chills, fevers, SOB  Passed a small amount of gas, no BM  Seen on 2L O2 NC  NG in place to LIS    Had bladder spasms with clogged jung last night and required jung irrigation  This am, no urine in persistent small bowel dilatation. 5-3 CXR    EXAMINATION:   ONE XRAY VIEW OF THE CHEST       5/3/2020 10:20 am       COMPARISON:   Chest radiograph 04/29/2020.  CT abdomen and pelvis 04/29/2020.  Abdominal   radiograph today and yesterday.  Small-bowel follow-through yesterday.       HISTORY:   ORDERING SYSTEM PROVIDED HISTORY: new onset   TECHNOLOGIST PROVIDED HISTORY:   new onset   Reason for Exam: port upr       FINDINGS:   Shallow inflation.  Herniated stomach in the left hemithorax containing   enteric tube is noted.  Overall the gaseous distention of the stomach is   improved compared to exam before NG tube was placed.  Cardiac silhouette   enlargement again noted.  Generalized interstitial prominence appears   unchanged.  No pneumothorax or new airspace disease appreciated.  Gaseous   distention of bowel in the upper abdomen appears improved compared to the   chest radiograph.           Impression   1.  Chronic findings in the chest without acute airspace disease appreciated.       2.  Herniated stomach containing enteric tube.  The gaseous distention of the   stomach and bowel in the upper abdomen overall appears improved compared to   prior chest radiograph before NG tube was placed.           EXAMINATION:   ONE XRAY VIEW OF THE CHEST       4/29/2020 6:54 am       COMPARISON:   08/03/2013       HISTORY:   ORDERING SYSTEM PROVIDED HISTORY: wheezing   TECHNOLOGIST PROVIDED HISTORY:   wheezing   Reason for Exam: wheezing.  port upright at 625am       FINDINGS:   Cardiomegaly. Kirk Cure is an increased degree of lucency seen within the   patient's left chest related to intragastric air, which could be related to a   combination of a known hiatal hernia as well as elevated left hemidiaphragm   or worsening of hiatal hernia.  Underlying COPD.  Bronchial thickening is   identified.  No pneumothorax.  Osteopenia.  Degenerative changes in the   shoulders and spine.           Impression   The patient's stomach overlies the left lower 2/3 of the chest, which has   increased from the previous known hiatal hernia seen in 2013.  This could be   related to worsening of the patient's hiatal hernia, though a component of an   elevated left hemidiaphragm is in the differential.  This would be best   assessed with a CT scan of the chest if clinically concerned.       Cardiomegaly.       COPD, with some bronchial thickening noted which may be related to   superimposed acute bronchitis. Medical Decision [id number]:     4/29/2020  1:11 AM - AlexYaima mendez Incoming Lab Results From [facility]     Specimen Information: Urine, clean catch        Component Collected Lab   Specimen Description 04/27/2020  9:50 AM [facility]- [address] Lab   . CLEAN CATCH URINE    Special Requests 04/27/2020  9:50 AM [facility]- [address] Lab   NOT REPORTED    Culture Abnormal  04/27/2020  9:50 AM [address] >[id number] CFU/ML THIS ORGANISM IS AN EXTENDED-SPECTRUM BETA-LACTAMASE  AND RESISTANCE TO THERAPY WITH PENICILLINS, CEPHALOSPORINS AND AZTREONAM IS EXPECTED.  THESE ORGANISMS GENERALLY REMAIN SUSCEPTIBLE TO CARBAPENEMS.  CONSIDER ID CONSULTATION. Testing Performed By     Lorrie Martinez Name Director Address Valid Date Range   [address] [address] 08/30/17 0801-Present   [address] Marquez Gaston MD [address] [facility].   [facility] [address] 11/17/17 1521-Present   Susceptibility     Escherichia coli (1)     Antibiotic Interpretation CORRINA Status    amikacin   Final     NOT REPORTED   ampicillin Resistant  Final     >=32  RESISTANT   ampicillin-sulbactam   Final     NOT REPORTED   aztreonam Resistant  Final     <=1  RESISTANT   ceFAZolin Resistant  Final     >=64  RESISTANT   ceFAZolin Resistant Cefazolin sensitivity results can be used to predict the effectiveness of oral cephalosporins (eg. Cephalexin) in uncomplicated Urinary Tract Infections due to E. coli, K. pneumoniae, and P. mirabilis Final    cefepime Resistant  Final     <=1  RESISTANT   cefTRIAXone Resistant  Final     >=64  RESISTANT   ciprofloxacin Resistant  Final     >=4  RESISTANT   ertapenem   Final     NOT REPORTED   Confirmatory Extended Spectrum Beta-Lactamase Positive POSITIVE Final    gentamicin Sensitive  Final     <=1  SUSCEPTIBLE   meropenem Sensitive  Final     <=0.25  SUSCEPTIBLE   nitrofurantoin Sensitive  Final     <=16  SUSCEPTIBLE   tigecycline   Final     NOT REPORTED   tobramycin Sensitive  Final     <=1  SUSCEPTIBLE   trimethoprim-sulfamethoxazole Resistant  Final     >=320  RESISTANT   piperacillin-tazobactam Resistant  Final     <=4  RESISTANT       Medical Decision Making-Other:     Note:  · Labs, medications, radiologic studies were reviewed with personal review of films  · Moderate Large amounts of data were reviewed  · Discussed with nursing Staff, Discharge planner  · Infection Control and Prevention measures reviewed  · All prior entries were reviewed  · Administer medications as ordered  · Prognosis: Good  · Discharge planning reviewed  · Follow up as outpatient. Thank you for allowing us to participate in the care of this patient. Please call with questions.     Valeri Castro MD  Pager: [phone] - Office: [phone]

## 2020-05-04 NOTE — PROGRESS NOTES
has fluctuated,    -Patient still has ileus versus small bowel obstruction and needing NG tube suctioning  General surgery is on board         Condition    [x] ill ,     [x] high risk , [] critical ,        [] improved but still labile                                        [] delirium ,      [] -----,                 [] I----     Unit  [] ICU           [] PICU       [] MED_SRG             []  Other  Prognosis -              Medications: Allergies: Allergies   Allergen Reactions    Bactrim [Sulfamethoxazole-Trimethoprim] Hives    Sulfamethoxazole-Trimethoprim        Current Meds:   Scheduled Meds:    docusate sodium  100 mg Oral BID    metoclopramide  5 mg Intravenous Q24H    albuterol  2.5 mg Nebulization 4x daily    senna  1 tablet Oral Nightly    enoxaparin  40 mg Subcutaneous Daily    budesonide-formoterol  2 puff Inhalation BID    meropenem  1 g Intravenous Q12H    latanoprost  1 drop Both Eyes Nightly    levothyroxine  75 mcg Oral Daily    pravastatin  80 mg Oral Nightly    sodium chloride flush  10 mL Intravenous 2 times per day    polyethylene glycol  17 g Oral Daily     Continuous Infusions:    sodium chloride 50 mL/hr at 05/01/20 1300    dextrose      sodium chloride       PRN Meds: albuterol, glucose, dextrose, glucagon (rDNA), dextrose, albuterol sulfate HFA, sodium chloride flush, acetaminophen **OR** acetaminophen, ondansetron, opium-belladonna      ---- ;     [facility] Dalj[address] Rogers[address], [address] [place] [address].    Phone [phone]   Fax: [phone]  Answering Service: [phone]

## 2020-05-04 NOTE — PROGRESS NOTES
and sacroiliac joints. No fracture. 1. No ureteral calculi. 2. High attenuation material in the bladder which may be related to blood products. Cystoscopy may be of benefit for further evaluation. 3. Large defect in the left hemidiaphragm with herniation of stomach and colon in the left hemithorax. 4. Colonic diverticulosis. 5. Atherosclerotic disease. 6. Small amount of free pelvic fluid of uncertain etiology. 7. Bilateral inguinal hernias containing fat. Nm Lung Scan Perfusion Only    Result Date: 4/29/2020  EXAMINATION: NUCLEAR MEDICINE PERFUSION SCAN. 4/29/2020 TECHNIQUE: Ventilation scan not performed due to facility constraints. 2.9 millicuries of Tc 52O MAA was administered intravenously prior to planar imaging of the lungs in multiple projections. COMPARISON: Chest radiograph 04/29/2020. HISTORY: ORDERING SYSTEM PROVIDED HISTORY: rule out PE TECHNOLOGIST PROVIDED HISTORY: rule out PE Reason for Exam: rule out PE, short of breath Relevant Medical/Surgical History: UTI, hypertension FINDINGS: PERFUSION: Distribution of radiotracer is homogeneous. No segmental defects identified. CHEST RADIOGRAPH: There is elevation of left hemidiaphragm. Overall lung volumes are low. No focal lung infiltrate. Low Probability for Pulmonary Embolus. Xr Chest Portable    Result Date: 4/29/2020  EXAMINATION: ONE XRAY VIEW OF THE CHEST 4/29/2020 6:54 am COMPARISON: 08/03/2013 HISTORY: ORDERING SYSTEM PROVIDED HISTORY: wheezing TECHNOLOGIST PROVIDED HISTORY: wheezing Reason for Exam: wheezing. port upright at 625am FINDINGS: Cardiomegaly. There is an increased degree of lucency seen within the patient's left chest related to intragastric air, which could be related to a combination of a known hiatal hernia as well as elevated left hemidiaphragm or worsening of hiatal hernia. Underlying COPD. Bronchial thickening is identified. No pneumothorax. Osteopenia. Degenerative changes in the shoulders and spine. The patient's stomach overlies the left lower 2/3 of the chest, which has increased from the previous known hiatal hernia seen in 2013. This could be related to worsening of the patient's hiatal hernia, though a component of an elevated left hemidiaphragm is in the differential.  This would be best assessed with a CT scan of the chest if clinically concerned. Cardiomegaly. COPD, with some bronchial thickening noted which may be related to superimposed acute bronchitis. Vl Dup Lower Extremity Venous Bilateral    Result Date: 4/29/2020    [facility]  Vascular Lower Extremities DVT Study Procedure   Patient Name  Evette Lyon     Date of Study         04/29/2020                MACIEJ RZAA   Date of Birth 1935  Gender                Female   Age           80 year(s)  Race                     Room Number   2020   Corporate ID  [id number]  #   Patient Kenzie  [de-identified]  #   MR #          [id number]     Sonographer           Forrest Villela RVT, RDMS   Accession #   [id number]   Interpreting          [address] [place] [address]                            Physician   Referring                 Referring Physician   Katie Ramirez  Nurse  Practitioner  Procedure Type of Study:   Veins: Lower Extremities DVT Study, Venous Scan Lower Bilateral.  Indications for Study:Swelling. Patient Status: In Patient. Conclusions   Summary   No evidence of superficial or deep venous thrombosis in both lower  extremities.    Signature   ----------------------------------------------------------------  Electronically signed by Forrest Villela RVT, RDMS(Sonographer) on 04/29/2020 06:36 PM  ----------------------------------------------------------------   ----------------------------------------------------------------  Electronically signed by Glenys Modena Reyes,Arthur(Interpreting  physician) on 04/29/2020 06:54 PM  ----------------------------------------------------------------  Findings:   Right Impression:                    Left Impression:  The common femoral, femoral,         The common femoral, femoral,  popliteal and tibial veins           popliteal and tibial veins  demonstrate normal compressibility   demonstrate normal compressibility  and augmentation. and augmentation. Normal compressibility of the great  Normal compressibility of the great  saphenous vein. saphenous vein. Normal compressibility of the small  Normal compressibility of the small  saphenous vein. saphenous vein. Risk Factors History +--------------------------+---------------------+-------------------------+ ! Diagnosis                 ! Date                 ! Comments                 ! +--------------------------+---------------------+-------------------------+ ! Other                     !                     !COVID 19                 ! +--------------------------+---------------------+-------------------------+   - The patient's risk factor(s) include: dyslipidemia and arterial     hypertension. Velocities are measured in cm/s ; Diameters are measured in cm Right Lower Extremities DVT Study Measurements Right 2D Measurements +------------------------------------+----------+---------------+----------+ ! Location                            ! Visualized! Compressibility! Thrombosis! +------------------------------------+----------+---------------+----------+ ! Common Femoral                      !Yes       ! Yes            ! None      ! +------------------------------------+----------+---------------+----------+ ! Prox Femoral                        !Yes       ! Yes            ! None      ! +------------------------------------+----------+---------------+----------+ ! Mid Femoral                         !Yes       ! Yes            ! None      ! +------------------------------------+----------+---------------+----------+ ! Dist Femoral                        !Yes       ! Yes            ! None      ! +------------------------------------+----------+---------------+----------+ ! Popliteal                           !Yes       ! Yes            ! None      ! +------------------------------------+----------+---------------+----------+ ! Sapheno Femoral Junction            ! Yes       ! Yes            ! None      ! +------------------------------------+----------+---------------+----------+ ! PTV                                 ! Yes       ! Yes            ! None      ! +------------------------------------+----------+---------------+----------+ ! Peroneal                            !Yes       ! Yes            ! None      ! +------------------------------------+----------+---------------+----------+ ! Gastroc                             ! Yes       ! Yes            ! None      ! +------------------------------------+----------+---------------+----------+ ! GSV Thigh                           ! Yes       ! Yes            ! None      ! +------------------------------------+----------+---------------+----------+ ! GSV Knee                            ! Yes       ! Yes            ! None      ! +------------------------------------+----------+---------------+----------+ ! GSV Ankle                           ! Yes       ! Yes            ! None      ! +------------------------------------+----------+---------------+----------+ ! SSV                                 ! Yes       ! Yes            ! None      ! +------------------------------------+----------+---------------+----------+ Right Doppler Measurements +---------------------------+------+------+--------------------------------+ ! Location                   ! Signal!Reflux! Reflux (msec)                   ! +---------------------------+------+------+--------------------------------+ ! Common Femoral             !Phasic!      !                                ! +---------------------------+------+------+--------------------------------+ ! Prox Femoral               !Phasic!      ! !Yes       !Yes            ! None      ! +------------------------------------+----------+---------------+----------+ ! GSV Knee                            ! Yes       ! Yes            ! None      ! +------------------------------------+----------+---------------+----------+ ! GSV Ankle                           ! Yes       ! Yes            ! None      ! +------------------------------------+----------+---------------+----------+ ! SSV                                 ! Yes       ! Yes            ! None      ! +------------------------------------+----------+---------------+----------+ Left Doppler Measurements +---------------------------+------+------+--------------------------------+ ! Location                   ! Signal!Reflux! Reflux (msec)                   ! +---------------------------+------+------+--------------------------------+ ! Common Femoral             !Phasic!      !                                ! +---------------------------+------+------+--------------------------------+ ! Prox Femoral               !Phasic!      !                                ! +---------------------------+------+------+--------------------------------+ ! Popliteal                  !Phasic!      !                                ! 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  Coli UTI [A41.9, R65.20] 28/67/6126    Neutrophilic leukocytosis [U92.2] 04/29/2020    Anemia [D64.9] 04/29/2020    Hematuria [R31.9] 04/27/2020    Gastroesophageal reflux disease without esophagitis [K21.9] 04/27/2020

## 2020-05-04 NOTE — PROGRESS NOTES
Pt experiencing leaking around jung catheter due to bladder spasms. Provider notified. Pt given ditropan and B&O suppository. Medication unsuccessful in alleviating spasms. Provider then notified that patient was retaining 606mL in bladder. Jung irrigation orders were initiated and were successful in draining bladder. Pt with a total of 650 mL out of jung catheter. I/Os inaccurate d/t moderate-large amount of leaking around jung throughout shift. Will continue to assess and intervene as necessary.       Electronically signed by Pat Dent RN on 5/4/2020 at 6:10 AM

## 2020-05-04 NOTE — PLAN OF CARE
Problem: Pain:  Goal: Pain level will decrease  Description: Pain level will decrease  5/4/2020 0419 by Nancy Rangel RN  Outcome: Met This Shift  5/3/2020 1812 by Marely Rivera RN  Outcome: Ongoing  Goal: Control of acute pain  Description: Control of acute pain  5/4/2020 0419 by Nancy Rangel RN  Outcome: Met This Shift  5/3/2020 1812 by Marely Rivera RN  Outcome: Ongoing  Goal: Control of chronic pain  Description: Control of chronic pain  5/4/2020 0419 by Nancy Rangel RN  Outcome: Met This Shift  5/3/2020 1812 by Marely Rivera RN  Outcome: Ongoing     Problem: Falls - Risk of:  Goal: Will remain free from falls  Description: Will remain free from falls  5/4/2020 0419 by Nancy Rangel RN  Outcome: Met This Shift  5/3/2020 1812 by Marely Rivera RN  Outcome: Ongoing  Goal: Absence of physical injury  Description: Absence of physical injury  5/4/2020 0419 by Nancy Rangel RN  Outcome: Met This Shift  5/3/2020 1812 by Marely Rivera RN  Outcome: Ongoing     Pt remains free from falls. Pt assessed as high fall risk. Fall precautions in place including fall star posted, bed locked in lowest position, non skid slippers in place, bed alarm activated, side rails x2, call light in reach and room free from clutter. Will continue to monitor and reinforce use of call light with hourly rounding.     Problem: Venous Thromboembolism:  Goal: Will show no signs or symptoms of venous thromboembolism  Description: Will show no signs or symptoms of venous thromboembolism  5/4/2020 0419 by Nancy Rangel RN  Outcome: Met This Shift  5/3/2020 1812 by Marely Rivera RN  Outcome: Ongoing  Goal: Absence of signs or symptoms of impaired coagulation  Description: Absence of signs or symptoms of impaired coagulation  5/4/2020 0419 by Nancy Rangel RN  Outcome: Met This Shift  5/3/2020 1812 by Marely iRvera RN  Outcome: Ongoing     Problem: DAILY CARE  Goal: Daily care needs are met  5/4/2020 0419 by Nancy Rangel RN  Outcome: Ongoing  5/3/2020 1812 by Jocelynn Allen RN  Outcome: Ongoing     Problem: PAIN  Goal: Patient's pain/discomfort is manageable  5/4/2020 0419 by Romelia Neves RN  Outcome: Ongoing  5/3/2020 1812 by Jocelynn Allen RN  Outcome: Ongoing     Problem: Tissue Perfusion - Renal, Altered:  Goal: Electrolytes within specified parameters  Description: Electrolytes within specified parameters  5/4/2020 0419 by Romelia Neves RN  Outcome: Ongoing  5/3/2020 1812 by Jocelynn Allen RN  Outcome: Ongoing  Goal: Urine creatinine clearance will be within specified parameters  Description: Urine creatinine clearance will be within specified parameters  5/4/2020 0419 by Romelia Neves RN  Outcome: Ongoing  5/3/2020 1812 by Jocelynn Allen RN  Outcome: Ongoing  Goal: Serum creatinine will be within specified parameters  Description: Serum creatinine will be within specified parameters  5/4/2020 0419 by Romelia Neves RN  Outcome: Ongoing  5/3/2020 1812 by Jocelynn Allen RN  Outcome: Ongoing  Goal: Ability to achieve a balanced intake and output will improve  Description: Ability to achieve a balanced intake and output will improve  5/4/2020 0419 by Romelia Neves RN  Outcome: Ongoing  5/3/2020 1812 by Jocelynn Allen RN  Outcome: Ongoing     Problem: Discharge Planning:  Goal: Discharged to appropriate level of care  Description: Discharged to appropriate level of care  5/4/2020 0419 by Romelia Neves RN  Outcome: Ongoing  5/3/2020 1812 by Jocelynn Allen RN  Outcome: Ongoing     Problem: Gas Exchange - Impaired:  Goal: Levels of oxygenation will improve  Description: Levels of oxygenation will improve  5/4/2020 0419 by Romelia Neves RN  Outcome: Ongoing  5/3/2020 1812 by Jocelynn Allen RN  Outcome: Ongoing     Problem: Infection, Septic Shock:  Goal: Will show no infection signs and symptoms  Description: Will show no infection signs and symptoms  5/4/2020 0419 by Romelia Neves RN  Outcome: Ongoing  5/3/2020 1812 by

## 2020-05-05 PROBLEM — A41.9 SEVERE SEPSIS (HCC): Status: RESOLVED | Noted: 2020-04-29 | Resolved: 2020-05-05

## 2020-05-05 PROBLEM — N17.9 AKI (ACUTE KIDNEY INJURY) (HCC): Status: RESOLVED | Noted: 2020-04-29 | Resolved: 2020-05-05

## 2020-05-05 PROBLEM — R65.20 SEVERE SEPSIS (HCC): Status: RESOLVED | Noted: 2020-04-29 | Resolved: 2020-05-05

## 2020-05-05 LAB
ANION GAP SERPL CALCULATED.3IONS-SCNC: 13 MMOL/L (ref 9–17)
BUN BLDV-MCNC: 23 MG/DL (ref 8–23)
BUN/CREAT BLD: ABNORMAL (ref 9–20)
CALCIUM SERPL-MCNC: 8.3 MG/DL (ref 8.6–10.4)
CHLORIDE BLD-SCNC: 102 MMOL/L (ref 98–107)
CO2: 26 MMOL/L (ref 20–31)
CREAT SERPL-MCNC: 0.69 MG/DL (ref 0.5–0.9)
CULTURE: NORMAL
CULTURE: NORMAL
GFR AFRICAN AMERICAN: >60 ML/MIN
GFR NON-AFRICAN AMERICAN: >60 ML/MIN
GFR SERPL CREATININE-BSD FRML MDRD: ABNORMAL ML/MIN/{1.73_M2}
GFR SERPL CREATININE-BSD FRML MDRD: ABNORMAL ML/MIN/{1.73_M2}
GLUCOSE BLD-MCNC: 96 MG/DL (ref 70–99)
HCT VFR BLD CALC: 26.5 % (ref 36.3–47.1)
HEMOGLOBIN: 8.1 G/DL (ref 11.9–15.1)
Lab: NORMAL
Lab: NORMAL
MCH RBC QN AUTO: 31.8 PG (ref 25.2–33.5)
MCHC RBC AUTO-ENTMCNC: 30.6 G/DL (ref 28.4–34.8)
MCV RBC AUTO: 103.9 FL (ref 82.6–102.9)
NRBC AUTOMATED: 0 PER 100 WBC
PDW BLD-RTO: 14.4 % (ref 11.8–14.4)
PLATELET # BLD: 339 K/UL (ref 138–453)
PMV BLD AUTO: 8.8 FL (ref 8.1–13.5)
POTASSIUM SERPL-SCNC: 4.4 MMOL/L (ref 3.7–5.3)
RBC # BLD: 2.55 M/UL (ref 3.95–5.11)
SODIUM BLD-SCNC: 141 MMOL/L (ref 135–144)
SPECIMEN DESCRIPTION: NORMAL
SPECIMEN DESCRIPTION: NORMAL
WBC # BLD: 11.5 K/UL (ref 3.5–11.3)

## 2020-05-05 PROCEDURE — 6360000002 HC RX W HCPCS: Performed by: NURSE PRACTITIONER

## 2020-05-05 PROCEDURE — 51701 INSERT BLADDER CATHETER: CPT

## 2020-05-05 PROCEDURE — 51798 US URINE CAPACITY MEASURE: CPT

## 2020-05-05 PROCEDURE — 97535 SELF CARE MNGMENT TRAINING: CPT

## 2020-05-05 PROCEDURE — 99232 SBSQ HOSP IP/OBS MODERATE 35: CPT | Performed by: SURGERY

## 2020-05-05 PROCEDURE — 80048 BASIC METABOLIC PNL TOTAL CA: CPT

## 2020-05-05 PROCEDURE — 94761 N-INVAS EAR/PLS OXIMETRY MLT: CPT

## 2020-05-05 PROCEDURE — 6370000000 HC RX 637 (ALT 250 FOR IP): Performed by: STUDENT IN AN ORGANIZED HEALTH CARE EDUCATION/TRAINING PROGRAM

## 2020-05-05 PROCEDURE — 97116 GAIT TRAINING THERAPY: CPT

## 2020-05-05 PROCEDURE — 2580000003 HC RX 258: Performed by: NURSE PRACTITIONER

## 2020-05-05 PROCEDURE — 2700000000 HC OXYGEN THERAPY PER DAY

## 2020-05-05 PROCEDURE — 94640 AIRWAY INHALATION TREATMENT: CPT

## 2020-05-05 PROCEDURE — 97110 THERAPEUTIC EXERCISES: CPT

## 2020-05-05 PROCEDURE — 2580000003 HC RX 258: Performed by: INTERNAL MEDICINE

## 2020-05-05 PROCEDURE — 6360000002 HC RX W HCPCS: Performed by: STUDENT IN AN ORGANIZED HEALTH CARE EDUCATION/TRAINING PROGRAM

## 2020-05-05 PROCEDURE — 36415 COLL VENOUS BLD VENIPUNCTURE: CPT

## 2020-05-05 PROCEDURE — 2060000000 HC ICU INTERMEDIATE R&B

## 2020-05-05 PROCEDURE — 6360000002 HC RX W HCPCS: Performed by: INTERNAL MEDICINE

## 2020-05-05 PROCEDURE — 99232 SBSQ HOSP IP/OBS MODERATE 35: CPT | Performed by: INTERNAL MEDICINE

## 2020-05-05 PROCEDURE — 85027 COMPLETE CBC AUTOMATED: CPT

## 2020-05-05 PROCEDURE — 2580000003 HC RX 258: Performed by: STUDENT IN AN ORGANIZED HEALTH CARE EDUCATION/TRAINING PROGRAM

## 2020-05-05 RX ADMIN — BUDESONIDE AND FORMOTEROL FUMARATE DIHYDRATE 2 PUFF: 80; 4.5 AEROSOL RESPIRATORY (INHALATION) at 21:17

## 2020-05-05 RX ADMIN — SODIUM CHLORIDE, PRESERVATIVE FREE 10 ML: 5 INJECTION INTRAVENOUS at 21:53

## 2020-05-05 RX ADMIN — LEVOTHYROXINE SODIUM 75 MCG: 75 TABLET ORAL at 09:50

## 2020-05-05 RX ADMIN — ALBUTEROL SULFATE 2.5 MG: 2.5 SOLUTION RESPIRATORY (INHALATION) at 21:17

## 2020-05-05 RX ADMIN — ALBUTEROL SULFATE 2.5 MG: 2.5 SOLUTION RESPIRATORY (INHALATION) at 12:07

## 2020-05-05 RX ADMIN — METOCLOPRAMIDE 5 MG: 5 INJECTION, SOLUTION INTRAMUSCULAR; INTRAVENOUS at 18:23

## 2020-05-05 RX ADMIN — ENOXAPARIN SODIUM 40 MG: 40 INJECTION, SOLUTION INTRAVENOUS; SUBCUTANEOUS at 09:49

## 2020-05-05 RX ADMIN — SODIUM CHLORIDE: 9 INJECTION, SOLUTION INTRAVENOUS at 21:56

## 2020-05-05 RX ADMIN — DOCUSATE SODIUM 100 MG: 100 CAPSULE, LIQUID FILLED ORAL at 21:54

## 2020-05-05 RX ADMIN — MEROPENEM 1 G: 1 INJECTION, POWDER, FOR SOLUTION INTRAVENOUS at 09:50

## 2020-05-05 RX ADMIN — BUDESONIDE AND FORMOTEROL FUMARATE DIHYDRATE 2 PUFF: 80; 4.5 AEROSOL RESPIRATORY (INHALATION) at 08:29

## 2020-05-05 RX ADMIN — LATANOPROST 1 DROP: 50 SOLUTION OPHTHALMIC at 22:06

## 2020-05-05 RX ADMIN — ALBUTEROL SULFATE 2.5 MG: 2.5 SOLUTION RESPIRATORY (INHALATION) at 08:29

## 2020-05-05 RX ADMIN — ALBUTEROL SULFATE 2.5 MG: 2.5 SOLUTION RESPIRATORY (INHALATION) at 16:21

## 2020-05-05 RX ADMIN — DOCUSATE SODIUM 100 MG: 100 CAPSULE, LIQUID FILLED ORAL at 09:50

## 2020-05-05 RX ADMIN — PRAVASTATIN SODIUM 80 MG: 20 TABLET ORAL at 21:54

## 2020-05-05 RX ADMIN — SODIUM CHLORIDE, PRESERVATIVE FREE 10 ML: 5 INJECTION INTRAVENOUS at 09:50

## 2020-05-05 RX ADMIN — SENNOSIDES 8.6 MG: 8.6 TABLET, FILM COATED ORAL at 21:54

## 2020-05-05 RX ADMIN — MEROPENEM 1 G: 1 INJECTION, POWDER, FOR SOLUTION INTRAVENOUS at 22:50

## 2020-05-05 ASSESSMENT — PAIN DESCRIPTION - PROGRESSION: CLINICAL_PROGRESSION: NOT CHANGED

## 2020-05-05 NOTE — CARE COORDINATION
Transitional Planning  Informed that patient would like to go to SNF as she feels too weak for her spouse to take care of at home. Given choice, would like referral to [facility]. eReferral sent. 0308 - Return call from Sheldon Tyson at [facility], they are not taking any admissions right now. Will need new choice. Spoke to patient, requested writer speak with friend Tk Amanda ([phone]). Called Trudi, no answer, left VM.     1820 - Spoke to patient's friend Tk Amanda, second choice would be [facility]. eReferral sent.

## 2020-05-05 NOTE — PROGRESS NOTES
Supervision  Short term goal 5: demonstrate ~30 min of functional activity tolerance implementing energy conservation strategies PRN      Therapy Time   Individual Concurrent Group Co-treatment   Time In  1320         Time Out  1420         Minutes  60 total tx time                 AALIYAH CALDERON/SANDRO

## 2020-05-05 NOTE — PROGRESS NOTES
encounter have been performed by me . I agree with the assessment, plan and orders as documented by the resident. Principal Problem:    Acute hemorrhagic cystitis  Active Problems:    Hyperlipidemia    Hypertension    Chronic coronary artery disease    Presence of cardiac and vascular implant and graft    Hematuria    Gastroesophageal reflux disease without esophagitis    Vitamin B12 deficiency    Cystitis    UTI due to extended-spectrum beta lactamase (ESBL) producing Escherichia coli    Neutrophilic leukocytosis    Anemia    COVID-19 virus not detected    Ileus (HCC)    Paraesophageal hernia  Resolved Problems:    JOSIAH (acute kidney injury) ([facility]    Hyperkalemia    Severe sepsis present on admission due to E. Coli UTI    Hypotension        --   CLINICAL COURSE ---          clinical course has improved,    -Nelsy Rodriguez is having less GI symptoms NG tube is out  She will start oral feeding         Condition    [] ill ,     [] high risk , [] critical ,        [] improved but still labile                                        [] delirium ,      [] -----,                 [] I----     Unit  [] ICU           [] PICU       [] MED_SRG             []  Other  Prognosis -              Medications: Allergies:     Allergies   Allergen Reactions    Bactrim [Sulfamethoxazole-Trimethoprim] Hives    Sulfamethoxazole-Trimethoprim        Current Meds:   Scheduled Meds:    docusate sodium  100 mg Oral BID    metoclopramide  5 mg Intravenous Q24H    albuterol  2.5 mg Nebulization 4x daily    senna  1 tablet Oral Nightly    enoxaparin  40 mg Subcutaneous Daily    budesonide-formoterol  2 puff Inhalation BID    meropenem  1 g Intravenous Q12H    latanoprost  1 drop Both Eyes Nightly    levothyroxine  75 mcg Oral Daily    pravastatin  80 mg Oral Nightly    sodium chloride flush  10 mL Intravenous 2 times per day    polyethylene glycol  17 g Oral Daily     Continuous Infusions:    sodium chloride 50 mL/hr at 05/01/20 1300

## 2020-05-05 NOTE — PROGRESS NOTES
willing to transfer to recliner. Pt denies pain at this time  General Comment  Comments: Pt left in chair with call light within reach, B LEs elevated   Pain Screening  Patient Currently in Pain: Denies  Vital Signs  Patient Currently in Pain: Denies       Orientation  Orientation  Overall Orientation Status: Within Normal Limits  Cognition      Objective   Bed mobility  Rolling to Right: Contact guard assistance  Supine to Sit: Minimal assistance  Sit to Supine: (pt left seated in recliner)  Scooting: Minimal assistance  Comment: increased time required to complete mobility  Transfers  Sit to Stand: Minimal Assistance  Stand to sit: Contact guard assistance  Bed to Chair: Contact guard assistance  Comment: slightly unsteady while standing requiring CGA for safety  Ambulation  Ambulation?: Yes  Ambulation 1  Surface: level tile  Device: Rolling Walker  Assistance: Contact guard assistance  Quality of Gait: flexed posture, shuffling gait, apparent effort required for mobility  Gait Deviations: Slow Blanche; Increased YARI; Decreased step length;Shuffles  Distance: 5ft bed to chair  Comments: ambulation distance limited by fatigue and decreased endurance  Stairs/Curb  Stairs?: No     Balance  Posture: Fair  Sitting - Static: Good  Sitting - Dynamic: Good  Standing - Static: Fair  Standing - Dynamic: Fair  Comments: standing balance assessed with RW  Exercises  Seated LE exercise program: Long Arc Quads, hip abduction/adduction, heel/toe raises, and marches.  Reps: 10x   Comments: IS to 1000 x10 reps    Goals  Short term goals  Time Frame for Short term goals: 12 visits  Short term goal 1: independent bed mobility  Short term goal 2: independent transfers  Short term goal 3: independent gait without device x 100'  Short term goal 4: independent stair ambulation x 1 step with 1 HR   Patient Goals   Patient goals : return home independently    Plan    Plan  Times per week: 5-6 visits weekly  Times per day: Daily  Current

## 2020-05-05 NOTE — PROGRESS NOTES
The patient's stomach overlies the left lower 2/3 of the chest, which has increased from the previous known hiatal hernia seen in 2013. This could be related to worsening of the patient's hiatal hernia, though a component of an elevated left hemidiaphragm is in the differential.  This would be best assessed with a CT scan of the chest if clinically concerned. Cardiomegaly. COPD, with some bronchial thickening noted which may be related to superimposed acute bronchitis. Vl Dup Lower Extremity Venous Bilateral    Result Date: 4/29/2020    [facility]  Vascular Lower Extremities DVT Study Procedure   Patient Name  Madeline Robledo     Date of Study         04/29/2020                MACIEJ RAZA   Date of Birth 1935  Gender                Female   Age           80 year(s)  Race                     Room Number   2020   Corporate ID  [id number]  #   Patient Kenzie  [de-identified]  #   MR #          [id number]     Sonographer           Garrett Teixeira RVT, RDMS   Accession #   [id number]   Interpreting          [address] [place] [address]                            Physician   Referring                 Referring Physician   Trupti Corrales  Nurse  Practitioner  Procedure Type of Study:   Veins: Lower Extremities DVT Study, Venous Scan Lower Bilateral.  Indications for Study:Swelling. Patient Status: In Patient. Conclusions   Summary   No evidence of superficial or deep venous thrombosis in both lower  extremities.    Signature   ----------------------------------------------------------------  Electronically signed by Garrett Teixeira RVT, RDMS(Sonographer) on 04/29/2020 06:36 PM  ----------------------------------------------------------------   ----------------------------------------------------------------  Electronically signed by Margarie Pastel Reyes,Arthur(Interpreting  physician) on 04/29/2020 06:54 PM  ----------------------------------------------------------------  Findings:   Right Impression:                    Left Impression:  The common femoral, femoral,         The common femoral, femoral,  popliteal and tibial veins           popliteal and tibial veins  demonstrate normal compressibility   demonstrate normal compressibility  and augmentation. and augmentation. Normal compressibility of the great  Normal compressibility of the great  saphenous vein. saphenous vein. Normal compressibility of the small  Normal compressibility of the small  saphenous vein. saphenous vein. Risk Factors History +--------------------------+---------------------+-------------------------+ ! Diagnosis                 ! Date                 ! Comments                 ! +--------------------------+---------------------+-------------------------+ ! Other                     !                     !COVID 19                 ! +--------------------------+---------------------+-------------------------+   - The patient's risk factor(s) include: dyslipidemia and arterial     hypertension. Velocities are measured in cm/s ; Diameters are measured in cm Right Lower Extremities DVT Study Measurements Right 2D Measurements +------------------------------------+----------+---------------+----------+ ! Location                            ! Visualized! Compressibility! Thrombosis! +------------------------------------+----------+---------------+----------+ ! Common Femoral                      !Yes       ! Yes            ! None      ! +------------------------------------+----------+---------------+----------+ ! Prox Femoral                        !Yes       ! Yes            ! None      ! +------------------------------------+----------+---------------+----------+ ! Mid Femoral                         !Yes       ! Yes            ! None      ! +------------------------------------+----------+---------------+----------+ ! Dist Femoral                        !Yes       ! Yes            ! None      ! !Yes       !Yes            ! None      ! +------------------------------------+----------+---------------+----------+ ! GSV Knee                            ! Yes       ! Yes            ! None      ! +------------------------------------+----------+---------------+----------+ ! GSV Ankle                           ! Yes       ! Yes            ! None      ! +------------------------------------+----------+---------------+----------+ ! SSV                                 ! Yes       ! Yes            ! None      ! +------------------------------------+----------+---------------+----------+ Left Doppler Measurements +---------------------------+------+------+--------------------------------+ ! Location                   ! Signal!Reflux! Reflux (msec)                   ! +---------------------------+------+------+--------------------------------+ ! Common Femoral             !Phasic!      !                                ! +---------------------------+------+------+--------------------------------+ ! Prox Femoral               !Phasic!      !                                ! +---------------------------+------+------+--------------------------------+ ! Popliteal                  !Phasic!      !                                ! 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  Coli UTI [A41.9, R65.20] 06/09/9673    Neutrophilic leukocytosis [V97.4] 04/29/2020    Anemia [D64.9] 04/29/2020    Hematuria [R31.9] 04/27/2020   

## 2020-05-05 NOTE — PROGRESS NOTES
deficits. Skin: Warm and dry with good turgor. Signs of peripheral arterial insufficiency. No ulcerations. No open wounds. Medical Decision Making -Laboratory:   I have independently reviewed/ordered the following labs:    CBC with Differential:   Recent Labs     2038 20  0559   WBC 10.2 11.5*   HGB 7.4* 8.1*   HCT 23.3* 26.5*    339     BMP:   Recent Labs     20  0538 20  0559   * 141   K 4.0 4.4    102   CO2 29 26   BUN 28* 23   CREATININE 0.84 0.69     Hepatic Function Panel:   No results for input(s): PROT, LABALBU, BILIDIR, IBILI, BILITOT, ALKPHOS, ALT, AST in the last 72 hours. No results for input(s): RPR in the last 72 hours. No results for input(s): HIV in the last 72 hours. No results for input(s): BC in the last 72 hours.   Lab Results   Component Value Date    MUCUS NOT REPORTED 2020    RBC 2.55 2020    TRICHOMONAS NOT REPORTED 2020    WBC 11.5 2020    YEAST NOT REPORTED 2020    TURBIDITY TURBID 2020     Lab Results   Component Value Date    CREATININE 0.69 2020    GLUCOSE 96 2020       Medical Decision Making-Imagin-4 AXR    EXAMINATION:   ONE SUPINE XRAY VIEW(S) OF THE ABDOMEN       2020 7:22 am       COMPARISON:   KUB from 2020, and upper GI with small-bowel follow-through from   2020       HISTORY:   ORDERING SYSTEM PROVIDED HISTORY: progression of contrast   TECHNOLOGIST PROVIDED HISTORY:   progression of contrast   Reason for Exam: sup       FINDINGS:   Previously identified small bowel contrast now within large bowel extending   to the rectum.  Small amount of rectal retained stool and diverticular   disease noted throughout.  No abnormal large bowel dilatation.  Mild   persistent dilatation small bowel loops, greatest approximately 2.8-3.0 cm   right mid abdomen.  No marked distension or obvious free air.       No organomegaly or mass.  Vascular aortic calcifications and DJD

## 2020-05-05 NOTE — PROGRESS NOTES
Pt had 6 beat run of vtach. Resident notified at bedside. Will continue to monitor.      Electronically signed by Sudhakar Bain RN on 5/5/2020 at 9:15 AM

## 2020-05-06 VITALS
OXYGEN SATURATION: 94 % | WEIGHT: 184.51 LBS | TEMPERATURE: 97.8 F | BODY MASS INDEX: 37.2 KG/M2 | HEART RATE: 90 BPM | SYSTOLIC BLOOD PRESSURE: 119 MMHG | DIASTOLIC BLOOD PRESSURE: 58 MMHG | RESPIRATION RATE: 25 BRPM | HEIGHT: 59 IN

## 2020-05-06 PROBLEM — R31.9 HEMATURIA: Status: RESOLVED | Noted: 2020-04-27 | Resolved: 2020-05-06

## 2020-05-06 PROBLEM — N30.01 ACUTE HEMORRHAGIC CYSTITIS: Status: RESOLVED | Noted: 2020-05-02 | Resolved: 2020-05-06

## 2020-05-06 LAB
ABSOLUTE EOS #: 0.48 K/UL (ref 0–0.44)
ABSOLUTE IMMATURE GRANULOCYTE: 0.41 K/UL (ref 0–0.3)
ABSOLUTE LYMPH #: 1.34 K/UL (ref 1.1–3.7)
ABSOLUTE MONO #: 0.91 K/UL (ref 0.1–1.2)
BASOPHILS # BLD: 0 % (ref 0–2)
BASOPHILS ABSOLUTE: 0.03 K/UL (ref 0–0.2)
DIFFERENTIAL TYPE: ABNORMAL
EOSINOPHILS RELATIVE PERCENT: 4 % (ref 1–4)
HCT VFR BLD CALC: 26.6 % (ref 36.3–47.1)
HEMOGLOBIN: 8.1 G/DL (ref 11.9–15.1)
IMMATURE GRANULOCYTES: 3 %
LYMPHOCYTES # BLD: 10 % (ref 24–43)
MCH RBC QN AUTO: 30.9 PG (ref 25.2–33.5)
MCHC RBC AUTO-ENTMCNC: 30.5 G/DL (ref 28.4–34.8)
MCV RBC AUTO: 101.5 FL (ref 82.6–102.9)
MONOCYTES # BLD: 7 % (ref 3–12)
NRBC AUTOMATED: 0 PER 100 WBC
PDW BLD-RTO: 14.2 % (ref 11.8–14.4)
PLATELET # BLD: 397 K/UL (ref 138–453)
PLATELET ESTIMATE: ABNORMAL
PMV BLD AUTO: 8.8 FL (ref 8.1–13.5)
RBC # BLD: 2.62 M/UL (ref 3.95–5.11)
RBC # BLD: ABNORMAL 10*6/UL
SEG NEUTROPHILS: 76 % (ref 36–65)
SEGMENTED NEUTROPHILS ABSOLUTE COUNT: 10.44 K/UL (ref 1.5–8.1)
WBC # BLD: 13.6 K/UL (ref 3.5–11.3)
WBC # BLD: ABNORMAL 10*3/UL

## 2020-05-06 PROCEDURE — 99233 SBSQ HOSP IP/OBS HIGH 50: CPT | Performed by: INTERNAL MEDICINE

## 2020-05-06 PROCEDURE — 6360000002 HC RX W HCPCS: Performed by: STUDENT IN AN ORGANIZED HEALTH CARE EDUCATION/TRAINING PROGRAM

## 2020-05-06 PROCEDURE — 6370000000 HC RX 637 (ALT 250 FOR IP): Performed by: NURSE PRACTITIONER

## 2020-05-06 PROCEDURE — 6370000000 HC RX 637 (ALT 250 FOR IP): Performed by: STUDENT IN AN ORGANIZED HEALTH CARE EDUCATION/TRAINING PROGRAM

## 2020-05-06 PROCEDURE — 6360000002 HC RX W HCPCS: Performed by: INTERNAL MEDICINE

## 2020-05-06 PROCEDURE — 97116 GAIT TRAINING THERAPY: CPT

## 2020-05-06 PROCEDURE — 51798 US URINE CAPACITY MEASURE: CPT

## 2020-05-06 PROCEDURE — 94640 AIRWAY INHALATION TREATMENT: CPT

## 2020-05-06 PROCEDURE — 85025 COMPLETE CBC W/AUTO DIFF WBC: CPT

## 2020-05-06 PROCEDURE — 36415 COLL VENOUS BLD VENIPUNCTURE: CPT

## 2020-05-06 PROCEDURE — 2580000003 HC RX 258: Performed by: STUDENT IN AN ORGANIZED HEALTH CARE EDUCATION/TRAINING PROGRAM

## 2020-05-06 RX ORDER — ALBUTEROL SULFATE 90 UG/1
2 AEROSOL, METERED RESPIRATORY (INHALATION) EVERY 6 HOURS PRN
Status: DISCONTINUED | OUTPATIENT
Start: 2020-05-06 | End: 2020-05-06 | Stop reason: HOSPADM

## 2020-05-06 RX ORDER — DOCUSATE SODIUM 100 MG/1
100 CAPSULE, LIQUID FILLED ORAL 2 TIMES DAILY
Qty: 60 CAPSULE | Refills: 0 | Status: SHIPPED | OUTPATIENT
Start: 2020-05-06 | End: 2020-06-05

## 2020-05-06 RX ORDER — SENNA AND DOCUSATE SODIUM 50; 8.6 MG/1; MG/1
2 TABLET, FILM COATED ORAL DAILY
Status: DISCONTINUED | OUTPATIENT
Start: 2020-05-06 | End: 2020-05-06 | Stop reason: HOSPADM

## 2020-05-06 RX ORDER — ALBUTEROL SULFATE 2.5 MG/3ML
2.5 SOLUTION RESPIRATORY (INHALATION) EVERY 6 HOURS PRN
Qty: 120 EACH | Refills: 3 | Status: SHIPPED | OUTPATIENT
Start: 2020-05-06 | End: 2021-03-31

## 2020-05-06 RX ORDER — ASPIRIN 81 MG/1
81 TABLET, CHEWABLE ORAL DAILY
Qty: 30 TABLET | Refills: 3 | Status: SHIPPED | OUTPATIENT
Start: 2020-05-12

## 2020-05-06 RX ORDER — BUDESONIDE AND FORMOTEROL FUMARATE DIHYDRATE 80; 4.5 UG/1; UG/1
2 AEROSOL RESPIRATORY (INHALATION) 2 TIMES DAILY
Qty: 1 INHALER | Refills: 3 | Status: SHIPPED | OUTPATIENT
Start: 2020-05-06 | End: 2021-03-31

## 2020-05-06 RX ORDER — ASPIRIN 81 MG/1
81 TABLET ORAL DAILY
Qty: 30 TABLET | Refills: 0 | Status: SHIPPED | OUTPATIENT
Start: 2020-05-12 | End: 2020-05-15

## 2020-05-06 RX ORDER — CLOPIDOGREL BISULFATE 75 MG/1
75 TABLET ORAL DAILY
Status: ON HOLD | COMMUNITY
End: 2020-05-06 | Stop reason: SDUPTHER

## 2020-05-06 RX ORDER — ALBUTEROL SULFATE 2.5 MG/3ML
2.5 SOLUTION RESPIRATORY (INHALATION) EVERY 6 HOURS PRN
Status: DISCONTINUED | OUTPATIENT
Start: 2020-05-06 | End: 2020-05-06 | Stop reason: HOSPADM

## 2020-05-06 RX ORDER — ASPIRIN 81 MG/1
81 TABLET, CHEWABLE ORAL DAILY
Status: ON HOLD | COMMUNITY
End: 2020-05-06 | Stop reason: SDUPTHER

## 2020-05-06 RX ORDER — CLOPIDOGREL BISULFATE 75 MG/1
75 TABLET ORAL DAILY
Qty: 30 TABLET | Refills: 3 | Status: SHIPPED | OUTPATIENT
Start: 2020-05-12

## 2020-05-06 RX ADMIN — LEVOTHYROXINE SODIUM 75 MCG: 75 TABLET ORAL at 09:00

## 2020-05-06 RX ADMIN — BUDESONIDE AND FORMOTEROL FUMARATE DIHYDRATE 2 PUFF: 80; 4.5 AEROSOL RESPIRATORY (INHALATION) at 08:19

## 2020-05-06 RX ADMIN — POLYETHYLENE GLYCOL 3350 17 G: 17 POWDER, FOR SOLUTION ORAL at 09:15

## 2020-05-06 RX ADMIN — SODIUM CHLORIDE, PRESERVATIVE FREE 10 ML: 5 INJECTION INTRAVENOUS at 11:59

## 2020-05-06 RX ADMIN — STANDARDIZED SENNA CONCENTRATE AND DOCUSATE SODIUM 2 TABLET: 8.6; 5 TABLET ORAL at 10:15

## 2020-05-06 RX ADMIN — ALBUTEROL SULFATE 2.5 MG: 2.5 SOLUTION RESPIRATORY (INHALATION) at 08:19

## 2020-05-06 RX ADMIN — DOCUSATE SODIUM 100 MG: 100 CAPSULE, LIQUID FILLED ORAL at 09:15

## 2020-05-06 RX ADMIN — ENOXAPARIN SODIUM 40 MG: 40 INJECTION, SOLUTION INTRAVENOUS; SUBCUTANEOUS at 11:58

## 2020-05-06 ASSESSMENT — PAIN SCALES - GENERAL: PAINLEVEL_OUTOF10: 0

## 2020-05-06 NOTE — PROGRESS NOTES
[facility]   Department of Internal Medicine - Staff Internal Medicine Teaching Service        Inpatient Daily Progress Note    Date: 2020  Patient Name: Sujey Pastrana  MRN: [id number]  [place]: [de-identified]   Date of Admission: 2020  5:27 AM  YOB: 1935  Primary Care Physician: Dea Parry  Attending Physician: Xi Carvajal MD   Room: [address] [place] [address]  Number of days in the hospital: 8    Subjective   Admitting Diagnosis: Acute hemorrhagic cystitis  Chief Complaint: No chief complaint on file. Pt was seen and examined at bedside. Afebrile  Vitals stable  On 1 lpm of oxygen via nasal cannula and saturating well. Blood sugars under control  Resting comfortably in the bed. Vitals stable. Labs reviewed. No acute events overnight. Patient states she is too weak and her  cannot take care of her. Likely SNF placement. Gen surg- ok with low fiber diet, signed off. OP follow up with Dr. Mary Siu for hiatal hernia      Objective   Vital Signs:  BP (!) 119/59   Pulse 91   Temp 97.8 °F (36.6 °C) (Temporal)   Resp 21   Ht 4' 11\" (1.499 m)   Wt 184 lb 8.2 oz (83.7 kg)   SpO2 99%   BMI 37.27 kg/m²     Temp (24hrs), Av.1 °F (36.7 °C), Min:97.3 °F (36.3 °C), Max:99 °F (37.2 °C)    In: .7   Out: 279 [Urine:279]  Physical Exam -  Physical Exam  Vitals signs reviewed. Constitutional:       Appearance: Normal appearance. HENT:      Head: Normocephalic and atraumatic. Right Ear: External ear normal.      Left Ear: External ear normal.      Mouth/Throat:      Mouth: Mucous membranes are moist.   Eyes:      Extraocular Movements: Extraocular movements intact. Conjunctiva/sclera: Conjunctivae normal.   Neck:      Musculoskeletal: Normal range of motion and neck supple. No neck rigidity. Cardiovascular:      Rate and Rhythm: Normal rate and regular rhythm. Heart sounds: Normal heart sounds. No murmur.    Pulmonary: LUZ MARINA Cross , including pertinent history and exam findings,    5/6/20   with the resident. I have seen and examined the patient and the key elements of all parts of the encounter have been performed by me . I agree with the assessment, plan and orders as documented by the resident. Principal Problem:    Acute hemorrhagic cystitis  Active Problems:    Hyperlipidemia    Hypertension    Chronic coronary artery disease    Presence of cardiac and vascular implant and graft    Hematuria    Gastroesophageal reflux disease without esophagitis    Vitamin B12 deficiency    Cystitis    UTI due to extended-spectrum beta lactamase (ESBL) producing Escherichia coli    Neutrophilic leukocytosis    Anemia    COVID-19 virus not detected    Ileus (HCC)    Paraesophageal hernia  Resolved Problems:    JOSIAH (acute kidney injury) ([facility]    Hyperkalemia    Severe sepsis present on admission due to E. Coli UTI    Hypotension        --   CLINICAL COURSE ---          clinical course has fluctuated,    -         Condition    [] ill ,     [x] high risk , [] critical ,        [] improved but still labile                                        [] delirium ,      [] -----,                 [] I----     Unit  [] ICU           [] PICU       [] MED_SRG             []  Other  Prognosis -              Medications: Allergies:     Allergies   Allergen Reactions    Bactrim [Sulfamethoxazole-Trimethoprim] Hives    Sulfamethoxazole-Trimethoprim        Current Meds:   Scheduled Meds:    sennosides-docusate sodium  2 tablet Oral Daily    docusate sodium  100 mg Oral BID    metoclopramide  5 mg Intravenous Q24H    albuterol  2.5 mg Nebulization 4x daily    senna  1 tablet Oral Nightly    enoxaparin  40 mg Subcutaneous Daily    budesonide-formoterol  2 puff Inhalation BID    latanoprost  1 drop Both Eyes Nightly    levothyroxine  75 mcg Oral Daily    pravastatin  80 mg Oral Nightly    sodium chloride flush  10 mL Intravenous 2 times per day  polyethylene glycol  17 g Oral Daily     Continuous Infusions:    sodium chloride 75 mL/hr at 05/06/20 0410    dextrose      sodium chloride       PRN Meds: albuterol, glucose, dextrose, glucagon (rDNA), dextrose, albuterol sulfate HFA, sodium chloride flush, acetaminophen **OR** acetaminophen, ondansetron, opium-belladonna      ---- ;     [facility]  [address]    Phone [phone]   Fax: [phone]  Answering Service: [phone]

## 2020-05-06 NOTE — PROGRESS NOTES
History Narrative    Not on file       Family History:     Family History   Problem Relation Age of Onset    Diabetes Brother     Stroke Father     Coronary Art Dis Other         siblings X3        Allergies:   Bactrim [sulfamethoxazole-trimethoprim] and Sulfamethoxazole-trimethoprim     Review of Systems:   Constitutional: No fevers or chills. No systemic complaints  Head: No headaches  Eyes: No double vision or blurry vision. No conjunctival inflammation. ENT: No sore throat or runny nose. . No hearing loss, tinnitus or vertigo. Cardiovascular: No chest pain or palpitations. No shortness of breath. No CHOE  Lung: No shortness of breath or cough. No sputum production  Abdomen: Resolved abd pain, BM x 3  Genitourinary: Retention requiring st cath  Musculoskeletal: No muscle aches or pains. No joint effusions, swelling or deformities  Hematologic: No bleeding or bruising. Neurologic: No headache, weakness, numbness, or tingling. Integument: No rash, no ulcers. Psychiatric: No depression. Endocrine: No polyuria, no polydipsia, no polyphagia. Physical Examination :     Patient Vitals for the past 8 hrs:   BP Temp Temp src Pulse Resp SpO2 Weight   05/06/20 0829 -- -- -- -- 25 100 % --   05/06/20 0600 -- -- -- -- -- -- 184 lb 8.2 oz (83.7 kg)   05/06/20 0335 (!) 119/59 -- -- -- -- -- --   05/06/20 0333 (!) 111/46 97.8 °F (36.6 °C) Temporal 91 21 99 % --   05/06/20 0101 122/62 97.3 °F (36.3 °C) Temporal 92 26 100 % --     General Appearance: Awake, alert, and in no apparent distress. Heavy set  Head:  Normocephalic, no trauma. Eyes: Pupils equal, round, reactive to light and accommodation; extraocular movements intact; sclera anicteric; conjunctivae pink. No embolic phenomena. ENT: Oropharynx clear, without erythema, exudate, or thrush. No tenderness of sinuses. Mouth/throat: mucosa pink and moist. No lesions. Neck:Supple, without lymphadenopathy. Thyroid normal, No bruits.   Pulmonary/Chest: Distant sounds. Clear to auscultation, without wheezes, rales, or rhonchi. No dullness to percussion. Cardiovascular: Regular rate and rhythm with murmur, no rubs, or gallops. Abdomen: Soft, generally  tender. Bowel sounds active. No organomegaly  All four Extremities: No cyanosis, clubbing, edema, or effusions. Neurologic: No gross sensory or motor deficits. Skin: Warm and dry with good turgor. Signs of peripheral arterial insufficiency. No ulcerations. No open wounds. Medical Decision Making -Laboratory:   I have independently reviewed/ordered the following labs:    CBC with Differential:   Recent Labs     20   WBC 10.2 11.5*   HGB 7.4* 8.1*   HCT 23.3* 26.5*    339     BMP:   Recent Labs     2059   * 141   K 4.0 4.4    102   CO2 29 26   BUN 28* 23   CREATININE 0.84 0.69     Hepatic Function Panel:   No results for input(s): PROT, LABALBU, BILIDIR, IBILI, BILITOT, ALKPHOS, ALT, AST in the last 72 hours. No results for input(s): RPR in the last 72 hours. No results for input(s): HIV in the last 72 hours. No results for input(s): BC in the last 72 hours.   Lab Results   Component Value Date    MUCUS NOT REPORTED 2020    RBC 2.55 2020    TRICHOMONAS NOT REPORTED 2020    WBC 11.5 2020    YEAST NOT REPORTED 2020    TURBIDITY TURBID 2020     Lab Results   Component Value Date    CREATININE 0.69 2020    GLUCOSE 96 2020       Medical Decision Making-Imagin-4 AXR    EXAMINATION:   ONE SUPINE XRAY VIEW(S) OF THE ABDOMEN       2020 7:22 am       COMPARISON:   KUB from 2020, and upper GI with small-bowel follow-through from   2020       HISTORY:   ORDERING SYSTEM PROVIDED HISTORY: progression of contrast   TECHNOLOGIST PROVIDED HISTORY:   progression of contrast   Reason for Exam: sup       FINDINGS:   Previously identified small bowel contrast now within large bowel extending   to Exam: wheezing. port upright at 625am       FINDINGS:   Cardiomegaly. Marinus Ivan is an increased degree of lucency seen within the   patient's left chest related to intragastric air, which could be related to a   combination of a known hiatal hernia as well as elevated left hemidiaphragm   or worsening of hiatal hernia.  Underlying COPD.  Bronchial thickening is   identified.  No pneumothorax.  Osteopenia.  Degenerative changes in the   shoulders and spine.           Impression   The patient's stomach overlies the left lower 2/3 of the chest, which has   increased from the previous known hiatal hernia seen in 2013.  This could be   related to worsening of the patient's hiatal hernia, though a component of an   elevated left hemidiaphragm is in the differential.  This would be best   assessed with a CT scan of the chest if clinically concerned.       Cardiomegaly.       COPD, with some bronchial thickening noted which may be related to   superimposed acute bronchitis. Medical Decision [place]-D[facility]m:     4/29/2020  1:11 AM - Alex, Yaima Incoming Lab Results From [facility]     Specimen Information: Urine, clean catch        Component Collected Lab   Specimen Description 04/27/2020  9:50 AM [facility] [address] Lab   . CLEAN CATCH URINE    Special Requests 04/27/2020  9:50 AM [facility] [address] Lab   NOT REPORTED    Culture Abnormal  04/27/2020  9:50 AM [address] >[id number] CFU/ML THIS ORGANISM IS AN EXTENDED-SPECTRUM BETA-LACTAMASE  AND RESISTANCE TO THERAPY WITH PENICILLINS, CEPHALOSPORINS AND AZTREONAM IS EXPECTED.  THESE ORGANISMS GENERALLY REMAIN SUSCEPTIBLE TO CARBAPENEMS.  CONSIDER ID CONSULTATION.    Testing Performed By     Lorrie Martinez Name Director Address Valid Date Range   [address] [address] 08/30/17 0801-Present   [facility] [address] [facility]

## 2020-05-06 NOTE — PLAN OF CARE
Problem: DAILY CARE  Goal: Daily care needs are met  Outcome: Met This Shift     Problem: Pain:  Goal: Pain level will decrease  Description: Pain level will decrease  Outcome: Met This Shift  Goal: Control of acute pain  Description: Control of acute pain  Outcome: Met This Shift  Goal: Control of chronic pain  Description: Control of chronic pain  Outcome: Met This Shift     Problem: Falls - Risk of:  Goal: Will remain free from falls  Description: Will remain free from falls  Outcome: Met This Shift  Goal: Absence of physical injury  Description: Absence of physical injury  Outcome: Met This Shift     Problem: Tissue Perfusion - Renal, Altered:  Goal: Urine creatinine clearance will be within specified parameters  Description: Urine creatinine clearance will be within specified parameters  Outcome: Met This Shift  Goal: Serum creatinine will be within specified parameters  Description: Serum creatinine will be within specified parameters  Outcome: Met This Shift     Problem: PAIN  Goal: Patient's pain/discomfort is manageable  Outcome: Ongoing     Problem: Tissue Perfusion - Renal, Altered:  Goal: Electrolytes within specified parameters  Description: Electrolytes within specified parameters  Outcome: Ongoing  Goal: Ability to achieve a balanced intake and output will improve  Description: Ability to achieve a balanced intake and output will improve  Outcome: Ongoing     Patient unable to void on own. Q6 hr bladder scans performed and straight catheterizations. Total of 200mL urine output for shift; provider notified. Patient maintaining an adequate amount of PO intake. IVF increased to 75mL, will continue to monitor intake and output with hourly rounding throughout 7p-7a shift.      Problem: Discharge Planning:  Goal: Discharged to appropriate level of care  Description: Discharged to appropriate level of care  Outcome: Ongoing     Problem: Gas Exchange - Impaired:  Goal: Levels of oxygenation will improve  Description: Levels of oxygenation will improve  Outcome: Ongoing     Problem: Infection, Septic Shock:  Goal: Will show no infection signs and symptoms  Description: Will show no infection signs and symptoms  Outcome: Ongoing     Problem: Tissue Perfusion, Altered:  Goal: Circulatory function within specified parameters  Description: Circulatory function within specified parameters  Outcome: Ongoing     Problem: Venous Thromboembolism:  Goal: Will show no signs or symptoms of venous thromboembolism  Description: Will show no signs or symptoms of venous thromboembolism  Outcome: Ongoing  Goal: Absence of signs or symptoms of impaired coagulation  Description: Absence of signs or symptoms of impaired coagulation  Outcome: Ongoing     Problem: Musculor/Skeletal Functional Status  Goal: Highest potential functional level  Outcome: Ongoing  Goal: Absence of falls  Outcome: Ongoing     Problem: Nutrition  Goal: Optimal nutrition therapy  Outcome: Ongoing     Problem: Mobility - Impaired:  Goal: Mobility will improve  Description: Mobility will improve  Outcome: Ongoing       Electronically signed by Danica Jeong RN on 5/6/2020 at 6:24 AM

## 2020-05-06 NOTE — DISCHARGE SUMMARY
with herniation of stomach and  colon in the left hemithorax. 4. Colonic diverticulosis. 5. Atherosclerotic disease. 6. Small amount of free pelvic fluid of uncertain etiology. 7. Bilateral inguinal hernias containing fat.      Consults:     Consults:     Final Specialist Recommendations/Findings:   IP CONSULT TO PULMONOLOGY  IP CONSULT TO INFECTIOUS DISEASES  IP CONSULT TO NEPHROLOGY  IP CONSULT TO PULMONOLOGY  IP CONSULT TO INTERNAL MEDICINE  IP CONSULT TO GENERAL SURGERY  IP CONSULT TO CASE MANAGEMENT      Any Hospital Acquired Infections: none    Discharge Functional Status:  stable    DISCHARGE PLAN     Disposition: [facility]    Patient Instructions:   Current Discharge Medication List      START taking these medications    Details   albuterol (PROVENTIL) (2.5 MG/3ML) 0.083% nebulizer solution Take 3 mLs by nebulization every 6 hours as needed for Wheezing  Qty: 120 each, Refills: 3         CONTINUE these medications which have NOT CHANGED    Details   lisinopril (PRINIVIL;ZESTRIL) 10 MG tablet Take 10 mg by mouth daily      latanoprost (XALATAN) 0.005 % ophthalmic solution Place 1 drop into both eyes nightly      senna-docusate (PERICOLACE) 8.6-50 MG per tablet Take 1 tablet by mouth daily as needed constipation      albuterol sulfate HFA (VENTOLIN HFA) 108 (90 Base) MCG/ACT inhaler 1 spray by Nasal route as needed      Azelastine-Fluticasone (DYMISTA) 137-50 MCG/ACT SUSP 1 Squirt      levothyroxine (SYNTHROID) 75 MCG tablet Take 75 mcg by mouth daily      pravastatin (PRAVACHOL) 40 MG tablet Take 1 tablet by mouth nightly  Qty: 30 tablet, Refills: 3             Activity: activity as tolerated    Diet: low fiber diet    Follow-up:    Roverto Bowen  [address] [place] [address] [address] [address]    Schedule an appointment as soon as possible for a visit in 2 weeks  hospital follow up    Amilcar Ang MD  [address]  [phone]    Schedule an appointment as soon as possible for a visit  Please schedule appointment for follow-up of blood in the urine and need for cystoscopy and further imaging. Established urologist.    Maggie Angela MD  [facility] 90.  [address] [facility]  [phone]    Schedule an appointment as soon as possible for a visit in 2 weeks  hematuria and dysuria. hospital follow up. ESBL UTI. [address]  [address] [facility]  Graciela Landa 26  [phone]  Schedule an appointment as soon as possible for a visit in 1 week      Romulo Solis, DO  [address]  [phone]    Schedule an appointment as soon as possible for a visit in 2 weeks  for hiatal hernia. Patient Instructions:   · Urology:  Patient needs to follow up with her urologist Dr Dariana De La Fuente for CT urogram and cystoscopy in 2 weeks. Continue straight catheterization as needed on discharge at nursing facility. Do not restart aspirin and plavix for at least 5 days post discharge,  please his call office to schedule appointment ASAP. · Bladder scan every 8 hours, if PVR > 400 cc, straight cath. · Give laxatives and enemas as needed for constipation. · Continue low fiber diet with supplements. · Follow up with gen surg for hiatal hernia  · Follow up with Dr. Silvano Salgado for ESBL E.Coli. Follow up labs: none  Follow up imaging: none    Note that over 30 minutes was spent in preparing discharge papers, discussing discharge with patient, medication review, etc.      Emma Suazo MD, MD  Internal Medicine Resident, PGY-1  [address] [place], [place]  5/6/2020, 2:50 PM

## 2020-05-06 NOTE — CARE COORDINATION
Transitional Planning  Call to [facility] ([phone]), spoke to [facility], confirmed receipt of referral. They are not taking any admissions at this time. Will need new choice. [id number] - Call to patient's friend Severino Dong ([phone]), next choices are [facility] and [facility]. eReferrals sent. Return call from [address] [place] [address] at [facility], they do not have beds available at [address] [phone] - Return call from Sriram at [facility], they are able to accept patient today. Confirmed they can straight cath and they do have bladder scanner. Left VM for patient's contact Severino Dong to advise of accepted at [facility] and discharge. AVS with completed KATHY and HENS faxed. RN to call report to [phone].     Discharge [address] Case Management Department  Written by: Roverto Dallas RN    Patient Name: Krunal Dodson  Attending Provider: Sherlyn Rogers MD  Admit Date: 2020  5:27 AM  MRN: [id number]  Account: [de-identified]                     : 1935  Discharge Date:  20       Disposition: [facility] Jenniffer Contreras RN

## 2020-05-06 NOTE — PLAN OF CARE
Problem: RESPIRATORY  Intervention: Respiratory assessment  5/6/2020 1150 by Nancy Das RCP  Note: FLACO PONCEatient Assessment complete. Hematuria [R31.9] . Vitals:    05/06/20 0829   BP:    Pulse:    Resp: 25   Temp:    SpO2: 100%   . Patients home meds are   Prior to Admission medications    Medication Sig Start Date End Date Taking? Authorizing Provider   lisinopril (PRINIVIL;ZESTRIL) 10 MG tablet Take 10 mg by mouth daily    Historical Provider, MD   latanoprost (XALATAN) 0.005 % ophthalmic solution Place 1 drop into both eyes nightly    Historical Provider, MD   senna-docusate ([place]ola [place]ihu[place]ua) 8.6-50 MG per tablet Take 1 tablet by mouth daily as needed constipation    Historical Provider, MD   albuterol sulfate HFA (VENTOLIN HFA) 108 (90 Base) MCG/ACT inhaler 1 spray by Nasal route as needed 12/11/19 12/10/20  Historical Provider, MD   Azelastine-Fluticasone (DYMISTA) 137-50 MCG/ACT SUSP 1 Squirt    Historical Provider, MD   levothyroxine (SYNTHROID) 75 MCG tablet Take 75 mcg by mouth daily    Historical Provider, MD   pravastatin (PRAVACHOL) 40 MG tablet Take 1 tablet by mouth nightly  Patient taking differently: Take 80 mg by mouth nightly Changed in Nov 2019 7/31/15   Ade Jerez, DO     Assessment     Admit for Hematuria and developed SBO. Current issue with bladder urinary retention post catheter removal.  Currently on room air and tolerating adequate saturations. Last cxr showed low lung volumes. Incentive Spirometer at bedside and encouraged use. Has no history of asthma or smoking history. Currently on Symbicort. No issue with wheezing. Will  change bronchodialators to as needed for wheezing. Continue LABA, ICS as ordered.       RR 18  Breath Sounds: clear      Bronchodilator assessment at level  1  Hyperinflation assessment at level   Secretion Management assessment at level      [x]    Bronchodilator Assessment  BRONCHODILATOR ASSESSMENT SCORE  Score 0 1 2 3 4 5   Breath Sounds []  Patient Baseline [x]  No Wheeze good aeration []  Faint, scattered wheezing, good aeration []  Expiratory Wheezing and or moderately diminished []  Insp/Exp wheeze and/or very diminished []  Insp/Exp and/ or marked distress   Respiratory Rate   []  Patient Baseline [x]  Less than 20 [x]  Less than 20 []  20-25 []  Greater than 25 []  Greater than 25   Peak flow % of Pred or PB [x]  NA   []  Greater than 90%  []  81-90% []  71-80% []  Less than or equal to 70%  or unable to perform []  Unable due to Respiratory Distress   Dyspnea re []  Patient Baseline [x]  No SOB [x]  No SOB []  SOB on exertion []  SOB min activity []  At rest/acute   e FEV% Predicted       [x]  NA []  Above 69%  []  Unable []  Above 60-69%  []  Unable []  Above 50-59%  []  Unable []  Above 35-49%  []  Unable []  Less than 35%  []  Unable                 []  Hyperinflation Assessment  Score 1 2 3   CXR and Breath Sounds   []  Clear []  No atelectasis  Basilar aeration []  Atelectasis or absent basilar breath sounds   Incentive Spirometry Volume  (Per IBW)   []  Greater than or equal to 15ml/Kg []  less than 15ml/Kg []  less than 15ml/Kg   Surgery within last 2 weeks []  None or general   []  Abdominal or thoracic surgery  []  Abdominal or thoracic   Chronic Pulmonary Historyre []  No []  Yes []  Yes     []  Secretion Management Assessment  Score 1 2 3   Bilateral Breath Sounds   []  Occasional Rhonchi []  Scattered Rhonchi []  Course Rhonchi and/or poor aeration   Sputum    []  Small amount of thin secretions []  Moderate amount of viscous secretions []  Copius, Viscious Yellow/ Secretions   CXR as reported by physician []  clear  []  Unavailable []  Infiltrates and/or consolidation  []  Unavailable []  Mucus Plugging and or lobar consolidation  []  Unavailable   Cough []  Strong, productive cough []  Weak productive cough []  No cough or weak non-productive cough   JEFF LUA  11:50 AM                            FEMALE

## 2020-05-06 NOTE — PROGRESS NOTES
Patient had a pre-void bladder scan 269cc. Patient had a bowel movement and urinated some. . Patient stated relief. Urine was cloudy, dark.

## 2020-05-08 ENCOUNTER — TELEPHONE (OUTPATIENT)
Dept: INFECTIOUS DISEASES | Age: 85
End: 2020-05-08

## 2020-05-08 NOTE — TELEPHONE ENCOUNTER
Peng Foss MD. Schedule an appointment as soon as possible for a visit in 2 weeks. Specialty: Infectious Diseases   Why: hematuria and dysuria. hospital follow up. ESBL UTI.    Contact information:   Antoine Jeronimo [address] [place] [address] [id number] [phone]

## 2020-05-15 ENCOUNTER — APPOINTMENT (OUTPATIENT)
Dept: ULTRASOUND IMAGING | Age: 85
End: 2020-05-15
Payer: MEDICARE

## 2020-05-15 ENCOUNTER — HOSPITAL ENCOUNTER (EMERGENCY)
Age: 85
Discharge: HOME | End: 2020-05-15
Attending: EMERGENCY MEDICINE
Payer: MEDICARE

## 2020-05-15 VITALS
DIASTOLIC BLOOD PRESSURE: 69 MMHG | SYSTOLIC BLOOD PRESSURE: 134 MMHG | WEIGHT: 174 LBS | TEMPERATURE: 98.2 F | HEIGHT: 59 IN | RESPIRATION RATE: 15 BRPM | BODY MASS INDEX: 35.08 KG/M2 | HEART RATE: 83 BPM | OXYGEN SATURATION: 97 %

## 2020-05-15 PROCEDURE — 93971 EXTREMITY STUDY: CPT

## 2020-05-15 PROCEDURE — 99283 EMERGENCY DEPT VISIT LOW MDM: CPT

## 2020-05-15 RX ORDER — ASPIRIN 81 MG/1
81 TABLET ORAL DAILY
COMMUNITY
End: 2021-02-18 | Stop reason: SDUPTHER

## 2020-05-15 RX ORDER — ACETAMINOPHEN 500 MG
1000 TABLET ORAL EVERY 6 HOURS PRN
COMMUNITY

## 2020-05-15 RX ORDER — FUROSEMIDE 20 MG/1
20 TABLET ORAL DAILY
Status: ON HOLD | COMMUNITY
End: 2021-02-19 | Stop reason: HOSPADM

## 2020-05-15 NOTE — ED NOTES
Daughter Severino Dong on her way to transport pt back to [facility].      Verito Gallo RN  05/15/20 0580

## 2020-05-15 NOTE — ED PROVIDER NOTES
Previous Medications    ACETAMINOPHEN (TYLENOL) 500 MG TABLET    Take 1,000 mg by mouth every 6 hours as needed for Pain    ALBUTEROL (PROVENTIL) (2.5 MG/3ML) 0.083% NEBULIZER SOLUTION    Take 3 mLs by nebulization every 6 hours as needed for Wheezing    ALBUTEROL SULFATE HFA (VENTOLIN HFA) 108 (90 BASE) MCG/ACT INHALER    1 spray by Nasal route as needed    ASPIRIN 81 MG CHEWABLE TABLET    Take 1 tablet by mouth daily    ASPIRIN EC 81 MG EC TABLET    Take 81 mg by mouth daily    AZELASTINE-FLUTICASONE (DYMISTA) 137-50 MCG/ACT SUSP    1 Squirt    BUDESONIDE-FORMOTEROL (SYMBICORT) 80-4.5 MCG/ACT AERO    Inhale 2 puffs into the lungs 2 times daily    CLOPIDOGREL (PLAVIX) 75 MG TABLET    Take 1 tablet by mouth daily    DOCUSATE SODIUM (COLACE) 100 MG CAPSULE    Take 1 capsule by mouth 2 times daily    FUROSEMIDE (LASIX) 20 MG TABLET    Take 20 mg by mouth daily    LATANOPROST (XALATAN) 0.005 % OPHTHALMIC SOLUTION    Place 1 drop into both eyes nightly    LEVOTHYROXINE (SYNTHROID) 75 MCG TABLET    Take 75 mcg by mouth daily    LISINOPRIL (PRINIVIL;ZESTRIL) 10 MG TABLET    Take 10 mg by mouth daily    PRAVASTATIN (PRAVACHOL) 40 MG TABLET    Take 1 tablet by mouth nightly    SENNA-DOCUSATE (PERICOLACE) 8.6-50 MG PER TABLET    Take 1 tablet by mouth daily as needed constipation       ALLERGIES     is allergic to bactrim [sulfamethoxazole-trimethoprim] and sulfamethoxazole-trimethoprim. FAMILY HISTORY     She indicated that the status of her father is unknown. She indicated that the status of her brother is unknown. She indicated that the status of her other is unknown.     family history includes Coronary Art Dis in an other family member; Diabetes in her brother; Stroke in her father. SOCIAL HISTORY      reports that she has never smoked. She has never used smokeless tobacco. She reports that she does not drink alcohol or use drugs.     PHYSICAL EXAM     INITIAL VITALS:  height is 4' 11\" (1.499 m) and weight is 78.9 kg (174 lb). Her oral temperature is 98.2 °F (36.8 °C). Her blood pressure is 130/62 and her pulse is 84. Her respiration is 16 and oxygen saturation is 97%. Patient is alert and oriented, in no apparent distress. HEENT is atraumatic. Pupils are PERRL at 5 mm. Mucous membranes moist.    Neck is supple with no lymphadenopathy. No JVD. No meningismus. Heart sounds regular rate and rhythm with no gallops, murmurs, or rubs. Lungs clear, no wheezes, rales or rhonchi. Abdomen: soft, nontender with no pain to palpation. Musculoskeletal exam shows no evidence of trauma. Normal distal pulses in all extremities. Skin: 1-2+ edema in legs bilat with some stasis change noted. Neurological exam reveals cranial nerves 2 through 12 grossly intact. Patient has equal  and normal deep tendon reflexes. Psychiatric: no hallucinations or suicidal ideation. Lymphatics.:  No lymphadenopathy. DIFFERENTIAL DIAGNOSIS/ MDM:     Edema, DVT    DIAGNOSTIC RESULTS         RADIOLOGY:   I directly visualized the following  images and reviewed the radiologist interpretations:  US DUP LOWER EXTREMITY RIGHT KENNY   Final Result   1. Positive for DVT in the left popliteal vein. 2.  No evidence for DVT in the right lower extremity. Limited evaluation of   the calf veins. Findings were discussed with Halima Led at 1:00 pm on 5/15/2020. US DUP LOWER EXTREMITY LEFT KENNY   Final Result   1. Positive for DVT in the left popliteal vein. 2.  No evidence for DVT in the right lower extremity. Limited evaluation of   the calf veins. Findings were discussed with Halima Led at 1:00 pm on 5/15/2020. US DUP LOWER EXTREMITY RIGHT KENNY (Final result)   Result time 05/15/20 13:01:32   Final result by Ti Pike MD (05/15/20 13:01:32)                Impression:    1.  Positive for DVT in the left popliteal vein. 2.  No evidence for DVT in the right lower extremity.

## 2020-05-28 ENCOUNTER — HOSPITAL ENCOUNTER (OUTPATIENT)
Age: 85
Discharge: HOME | End: 2020-05-28
Payer: MEDICARE

## 2020-05-28 LAB
ANION GAP SERPL CALCULATED.3IONS-SCNC: 15 MMOL/L (ref 9–17)
BUN BLDV-MCNC: 11 MG/DL (ref 8–23)
BUN/CREAT BLD: ABNORMAL (ref 9–20)
CALCIUM SERPL-MCNC: 9.2 MG/DL (ref 8.6–10.4)
CHLORIDE BLD-SCNC: 97 MMOL/L (ref 98–107)
CO2: 24 MMOL/L (ref 20–31)
CREAT SERPL-MCNC: 0.85 MG/DL (ref 0.5–0.9)
GFR AFRICAN AMERICAN: >60 ML/MIN
GFR NON-AFRICAN AMERICAN: >60 ML/MIN
GFR SERPL CREATININE-BSD FRML MDRD: ABNORMAL ML/MIN/{1.73_M2}
GFR SERPL CREATININE-BSD FRML MDRD: ABNORMAL ML/MIN/{1.73_M2}
GLUCOSE BLD-MCNC: 93 MG/DL (ref 70–99)
HCT VFR BLD CALC: 32.9 % (ref 36–46)
HEMOGLOBIN: 10.3 G/DL (ref 12–16)
POTASSIUM SERPL-SCNC: 3.9 MMOL/L (ref 3.7–5.3)
SODIUM BLD-SCNC: 136 MMOL/L (ref 135–144)
TSH SERPL DL<=0.05 MIU/L-ACNC: 1.03 MIU/L (ref 0.3–5)

## 2020-05-28 PROCEDURE — 87088 URINE BACTERIA CULTURE: CPT

## 2020-05-28 PROCEDURE — 36415 COLL VENOUS BLD VENIPUNCTURE: CPT

## 2020-05-28 PROCEDURE — 87186 SC STD MICRODIL/AGAR DIL: CPT

## 2020-05-28 PROCEDURE — 80048 BASIC METABOLIC PNL TOTAL CA: CPT

## 2020-05-28 PROCEDURE — 87086 URINE CULTURE/COLONY COUNT: CPT

## 2020-05-28 PROCEDURE — 85018 HEMOGLOBIN: CPT

## 2020-05-28 PROCEDURE — 84443 ASSAY THYROID STIM HORMONE: CPT

## 2020-05-28 PROCEDURE — 85014 HEMATOCRIT: CPT

## 2020-05-30 LAB
CULTURE: ABNORMAL
Lab: ABNORMAL
SPECIMEN DESCRIPTION: ABNORMAL

## 2020-06-05 ENCOUNTER — HOSPITAL ENCOUNTER (OUTPATIENT)
Age: 85
Discharge: HOME | End: 2020-06-05
Payer: MEDICARE

## 2020-06-05 LAB
FERRITIN: 66 UG/L (ref 13–150)
IRON SATURATION: 20 % (ref 20–55)
IRON: 64 UG/DL (ref 37–145)
TOTAL IRON BINDING CAPACITY: 313 UG/DL (ref 250–450)
UNSATURATED IRON BINDING CAPACITY: 249 UG/DL (ref 112–347)

## 2020-06-05 PROCEDURE — 36415 COLL VENOUS BLD VENIPUNCTURE: CPT

## 2020-06-05 PROCEDURE — 82728 ASSAY OF FERRITIN: CPT

## 2020-06-05 PROCEDURE — 82746 ASSAY OF FOLIC ACID SERUM: CPT

## 2020-06-05 PROCEDURE — 83540 ASSAY OF IRON: CPT

## 2020-06-05 PROCEDURE — 83550 IRON BINDING TEST: CPT

## 2020-06-05 PROCEDURE — 82607 VITAMIN B-12: CPT

## 2020-06-06 LAB
FOLATE: 16 NG/ML
VITAMIN B-12: 967 PG/ML (ref 232–1245)

## 2020-08-31 ENCOUNTER — HOSPITAL ENCOUNTER (OUTPATIENT)
Dept: GENERAL RADIOLOGY | Age: 85
Discharge: HOME | End: 2020-09-02
Payer: MEDICARE

## 2020-08-31 ENCOUNTER — HOSPITAL ENCOUNTER (OUTPATIENT)
Age: 85
Discharge: HOME | End: 2020-09-02
Payer: MEDICARE

## 2020-08-31 PROCEDURE — 71046 X-RAY EXAM CHEST 2 VIEWS: CPT

## 2021-01-10 NOTE — PLAN OF CARE
Discharge- Rio  1968, 46 y o  male MRN: [id number]    Unit/Bed#: -01 Encounter: [id number]    Primary Care Provider: Wendy Jon   Date and time admitted to hospital: 1/8/2021  1:16 PM    * Syncope  Assessment & Plan  · Reports an episode of syncope on admission; patient reports feeling a intermittent buzzing over his head for the past several days; as well as some visual disturbances such as silver floaters  · Cardiac verses neurologic  · Appears to be cardiac in nature as patient did have positive orthostatic vital signs  · Telemetry, no events noted  · Orthostatics + with a significant drop from laying to sitting, including his heart rate  · Cardiology consult  · Echocardiogram pending  · Okay to discharge, cardiology office will follow up with patient  Stroke-like symptoms  Assessment & Plan  · Patient is complaining of some variable, nonspecific symptoms including headache, bilateral blurry vision, and slightly worsened left leg weakness  · Head CTA head and neck is negative except for thyroid nodule  · MRI Brain (1/8/21): No acute intracranial pathology  · Atorvastatin 40 mg, previously maintained on 20 mg  · Lipid panel relatively normal; triglycerides elevated at 180  · Aspirin  · Echocardiogram pending for today  · Neurology consult  · Signed off; unlikely that this is a stroke  · MRI normal    Acute kidney injury ([facility] [address] )  Assessment & Plan  · Baseline creatinine appears to be 0 8-0 9  · 1 38 on admission  · Creatinine now resolved and within baseline, 0 98  · Continue to hold lisinopril  · Resolved  TBI (traumatic brain injury) [facility])  Assessment & Plan  · History of; With residual left leg weakness that is worsened on admission  · Initially happened when in the [employer] many years ago    · Re-injured himself approximately 1 year ago when he fell backwards striking his head on a rock  · 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 Pt medicated by RN per PRN orders. Non-pharmacologic interventions offered to pt as well. Pt states pain is tolerable at this time. Will continue to monitor.      Problem: Falls - Risk of:  Goal: Will remain free from falls  Description: Will remain free from falls  Outcome: Ongoing     Problem: Falls - Risk of:  Goal: Absence of physical injury  Description: Absence of physical injury  Outcome: Ongoing activity  · Also reports patient has started hearing voices in sounds since his TBI    Hyperglycemia  Assessment & Plan  · Blood sugar greater than 400 on admission  · Hemoglobin A1c 12  · Resume home medication regimen on discharge  · Follow up with Endocrinology outpatient  · Carb controlled diet    Type 2 diabetes mellitus without complication, with long-term current use of insulin [facility])  Assessment & Plan  Lab Results   Component Value Date    HGBA1C 12 0 (H) 01/09/2021       Recent Labs     01/09/21  1121 01/09/21  1515 01/09/21  2058 01/10/21  0549   POCGLU 250* 245* 263* 225*       Blood Sugar Average: Last 72 hrs:  (P) 299 [id number]     · Hemoglobin A1c shows out of control blood sugars, hemoglobin A1C 12 0  · Carb controlled diet  · Resume home insulin regimen with addition of sliding scales  · Fingersticks  · Patient doesn't appear to follow up very well with his diabetes; would recommend Endocrinology consult outpatient  Discharging Physician / Practitioner: Pedro Bearden  PCP: Maegan Hester  Admission Date:   Admission Orders (From admission, onward)     Ordered        01/10/21 1007  Inpatient Admission  Once         01/08/21 1743  Place in Observation  Once                   Discharge Date: 01/10/21    Resolved Problems  Date Reviewed: 1/9/2021    None          Consultations During Hospital Stay:  · Neurology  · Cardiology    Procedures Performed:   · None    Significant Findings / Test Results:   · XR Chest 1 view portable (1/8/21): No acute cardiopulmonary disease  · CTA Head and Neck with and without contrast (1/8/21): 1  No evidence of acute intracranial hemorrhage  2  No evidence of hemodynamic significant stenosis, aneurysm or dissection  3  2 cm left thyroid nodule    Incidental discovery of one or more thyroid nodule(s) measuring more than 1 5 cm and without suspicious features is noted in this patient who is above 28years old; according to guidelines published in the February 2015  white paper on incidental thyroid nodules in the Journal of the [employer] of [employer]iology [place]), further characterization with thyroid ultrasound is recommended  · MRI Brain (1/8/21): No acute intracranial pathology  · Echocardiogram (1/10/21)    Incidental Findings:   · 2 cm left thyroid nodule  Incidental discovery of one or more thyroid nodule(s) measuring more than 1 5 cm and without suspicious features is noted in this patient who is above 28years old; according to guidelines published in the February 2015  white paper on incidental thyroid nodules in the Journal of the [employer] of [employer]iology [place]), further characterization with thyroid ultrasound is recommended  Test Results Pending at Discharge (will require follow up): · None     Outpatient Tests Requested:  · Ultrasound Thyroid  · Follow up with Cardiology for OP monitor  · Follow up with PCP within 1 wk    Complications:  None    Reason for Admission: Syncope    Hospital Course:     Allyson Zarate  is a 46 y o  male patient who originally presented to the hospital on 1/8/2021 due to syncopal episode, headache, blurry vision and left leg weakness  Patient has past medical history of DM, HTN, HLD, Seizures  Neurology was consulted and patient was ruled out for a stroke; neurology does not believe this to be a stroke at this time given his positive orthostatic vital signs  They also felt this was low chance for seizure activity as patient is already maintained on Depakote outpatient  Cardiology consulted for postural hypotension  Echocardiogram was obtained  Stable for discharge on current medication regimen and will follow up outpatient with Cardiology for OP monitor  He was also noted to have a slight CORDELL on admission which resolved after resolution of Lisinopril and IVF for hydration  He is stable for discharge  Please see above list of diagnoses and related plan for additional information       Condition at Discharge: good     Discharge Day Visit / Exam:     Subjective:  "I feel good, I felt good even after I passed out "  Denies complaints of chest pain, shortness of breath, fever or chills  Vitals: Blood Pressure: 145/92 (01/10/21 1051)  Pulse: 61 (01/10/21 1051)  Temperature: 97 5 °F (36 4 °C) (01/10/21 0849)  Temp Source: Oral (01/09/21 1904)  Respirations: 19 (01/10/21 0849)  Height: 5' 9" (175 3 cm) (01/08/21 1320)  Weight - Scale: 132 kg (290 lb 2 oz) (01/08/21 1320)  SpO2: 90 % (01/10/21 1051)     Exam:   Physical Exam  Constitutional:       Appearance: He is obese  He is not ill-appearing  Cardiovascular:      Rate and Rhythm: Normal rate  Pulses: Normal pulses  Heart sounds: Normal heart sounds  Pulmonary:      Effort: Pulmonary effort is normal       Breath sounds: Normal breath sounds  Abdominal:      General: Bowel sounds are normal  There is no distension  Palpations: Abdomen is soft  Tenderness: There is no abdominal tenderness  Musculoskeletal: Normal range of motion  General: No swelling or tenderness  Right lower leg: No edema  Left lower leg: No edema  Skin:     General: Skin is warm and dry  Capillary Refill: Capillary refill takes less than 2 seconds  Neurological:      Mental Status: He is alert  Mental status is at baseline  Psychiatric:         Mood and Affect: Mood normal           Discussion with Family: NA    Discharge instructions/Information to patient and family:   See after visit summary for information provided to patient and family  Provisions for Follow-Up Care:  See after visit summary for information related to follow-up care and any pertinent home health orders  Disposition:     Home    Planned Readmission: No     Discharge Statement:  I spent 35 minutes discharging the patient  This time was spent on the day of discharge  I had direct contact with the patient on the day of discharge   Greater than 50% of the total time was spent examining patient, answering all patient questions, arranging and discussing plan of care with patient as well as directly providing post-discharge instructions  Additional time then spent on discharge activities  Discharge Medications:  See after visit summary for reconciled discharge medications provided to patient and family        ** Please Note: This note has been constructed using a voice recognition system **

## 2021-01-12 ENCOUNTER — OFFICE VISIT (OUTPATIENT)
Dept: INFECTIOUS DISEASES | Age: 86
End: 2021-01-12
Payer: MEDICARE

## 2021-01-12 ENCOUNTER — HOSPITAL ENCOUNTER (OUTPATIENT)
Age: 86
Setting detail: SPECIMEN
Discharge: HOME | End: 2021-01-12
Payer: MEDICARE

## 2021-01-12 VITALS — HEART RATE: 97 BPM | TEMPERATURE: 92 F | DIASTOLIC BLOOD PRESSURE: 56 MMHG | SYSTOLIC BLOOD PRESSURE: 78 MMHG

## 2021-01-12 DIAGNOSIS — B96.29 UTI DUE TO EXTENDED-SPECTRUM BETA LACTAMASE (ESBL) PRODUCING ESCHERICHIA COLI: ICD-10-CM

## 2021-01-12 DIAGNOSIS — N39.0 UTI DUE TO EXTENDED-SPECTRUM BETA LACTAMASE (ESBL) PRODUCING ESCHERICHIA COLI: Primary | ICD-10-CM

## 2021-01-12 DIAGNOSIS — B96.29 UTI DUE TO EXTENDED-SPECTRUM BETA LACTAMASE (ESBL) PRODUCING ESCHERICHIA COLI: Primary | ICD-10-CM

## 2021-01-12 DIAGNOSIS — N39.0 UTI DUE TO EXTENDED-SPECTRUM BETA LACTAMASE (ESBL) PRODUCING ESCHERICHIA COLI: ICD-10-CM

## 2021-01-12 DIAGNOSIS — Z16.12 UTI DUE TO EXTENDED-SPECTRUM BETA LACTAMASE (ESBL) PRODUCING ESCHERICHIA COLI: Primary | ICD-10-CM

## 2021-01-12 DIAGNOSIS — Z16.12 UTI DUE TO EXTENDED-SPECTRUM BETA LACTAMASE (ESBL) PRODUCING ESCHERICHIA COLI: ICD-10-CM

## 2021-01-12 LAB
-: ABNORMAL
AMORPHOUS: ABNORMAL
BACTERIA: ABNORMAL
BILIRUBIN URINE: NEGATIVE
CASTS UA: ABNORMAL /LPF (ref 0–8)
COLOR: YELLOW
COMMENT UA: ABNORMAL
CRYSTALS, UA: ABNORMAL /HPF
EPITHELIAL CELLS UA: ABNORMAL /HPF (ref 0–5)
GLUCOSE URINE: NEGATIVE
KETONES, URINE: NEGATIVE
LEUKOCYTE ESTERASE, URINE: ABNORMAL
MUCUS: ABNORMAL
NITRITE, URINE: NEGATIVE
OTHER OBSERVATIONS UA: ABNORMAL
PH UA: 7 (ref 5–8)
PROTEIN UA: ABNORMAL
RBC UA: ABNORMAL /HPF (ref 0–4)
RENAL EPITHELIAL, UA: ABNORMAL /HPF
SPECIFIC GRAVITY UA: 1.01 (ref 1–1.03)
TRICHOMONAS: ABNORMAL
TURBIDITY: ABNORMAL
URINE HGB: ABNORMAL
UROBILINOGEN, URINE: NORMAL
WBC UA: ABNORMAL /HPF (ref 0–5)
YEAST: ABNORMAL

## 2021-01-12 PROCEDURE — G8484 FLU IMMUNIZE NO ADMIN: HCPCS | Performed by: INTERNAL MEDICINE

## 2021-01-12 PROCEDURE — 1090F PRES/ABSN URINE INCON ASSESS: CPT | Performed by: INTERNAL MEDICINE

## 2021-01-12 PROCEDURE — 99205 OFFICE O/P NEW HI 60 MIN: CPT | Performed by: INTERNAL MEDICINE

## 2021-01-12 PROCEDURE — 1036F TOBACCO NON-USER: CPT | Performed by: INTERNAL MEDICINE

## 2021-01-12 PROCEDURE — 4040F PNEUMOC VAC/ADMIN/RCVD: CPT | Performed by: INTERNAL MEDICINE

## 2021-01-12 PROCEDURE — G8417 CALC BMI ABV UP PARAM F/U: HCPCS | Performed by: INTERNAL MEDICINE

## 2021-01-12 PROCEDURE — 1123F ACP DISCUSS/DSCN MKR DOCD: CPT | Performed by: INTERNAL MEDICINE

## 2021-01-12 PROCEDURE — G8428 CUR MEDS NOT DOCUMENT: HCPCS | Performed by: INTERNAL MEDICINE

## 2021-01-12 PROCEDURE — G8399 PT W/DXA RESULTS DOCUMENT: HCPCS | Performed by: INTERNAL MEDICINE

## 2021-01-12 RX ORDER — FESOTERODINE FUMARATE 4 MG/1
4 TABLET, EXTENDED RELEASE ORAL DAILY
COMMUNITY
End: 2021-02-18

## 2021-01-12 NOTE — PROGRESS NOTES
Infectious disease Consult Note      Patient: Bushra Torrez  : 1935  Acct#:  [de-identified]     Date:  2021    Subjective:       History of Present Illness  Patient is a 80 y. o.female    Chief Complaint   Patient presents with    New Patient     uri/ records in care everywhere and media   The patient had T2 urothelial cell carcinoma status post  tumor resection 2020 initially followed by  5 cycles of fu /mitomycin and radiation that was completed. Status postop transurethral resection of residual bladder tumor 2020   Urine culture 2020 grew  E. coli that was sensitive to ampicillin/ sulbactam and Klebsiella ([address] was detected)  She was treated with Unasyn during her hospitalization, discharged on Augmentin. History of left lower extremity DVT, coronary artery disease, hypertension, hyperlipidemia, UTIs with history of ESBL, chronic renal disease, hydronephrosis. Prior bladder suspension surgery in 's  On 2021 creatinine 2.3  Interval history 2021  Today she is complaining of dysuria, incontinent urine, mild suprapubic tenderness, feeling of incomplete emptying, denied hematuria, denied fever or chills. She is incontinent urine  Blood pressure on the low side with a systolic Blood pressure in the 80s, denied dizziness or lightheadedness, denied chest pain or shortness of breath. The patient and daughter state that blood pressure was low yesterday at the radiation center received IV fluid. Her lisinopril  was increased recently by her cardiologist Dr. Malinda Jaquez was held. She is on Lasix also  Past Medical History:   Diagnosis Date    Coronary artery disease     ESBL (extended spectrum beta-lactamase) producing bacteria infection 2015    E.  Coli urine    HH (hiatus hernia)     Hyperkalemia 2020    Hyperlipidemia     Hypertension     Hypotension 2020    Thyroid disease Past Surgical History:   Procedure Laterality Date    ANGIOPLASTY      BLADDER SUSPENSION      CATARACT REMOVAL WITH IMPLANT Bilateral     TOTAL KNEE ARTHROPLASTY Right           Admission Meds  Current Outpatient Medications on File Prior to Visit   Medication Sig Dispense Refill    fesoterodine (TOVIAZ) 4 MG TB24 ER tablet Take 4 mg by mouth daily      aspirin EC 81 MG EC tablet Take 81 mg by mouth daily      furosemide (LASIX) 20 MG tablet Take 20 mg by mouth daily      acetaminophen (TYLENOL) 500 MG tablet Take 1,000 mg by mouth every 6 hours as needed for Pain      rivaroxaban (XARELTO) 15 MG TABS tablet Take 1 tablet by mouth daily (with breakfast) 30 tablet 1    albuterol (PROVENTIL) (2.5 MG/3ML) 0.083% nebulizer solution Take 3 mLs by nebulization every 6 hours as needed for Wheezing 120 each 3    budesonide-formoterol (SYMBICORT) 80-4.5 MCG/ACT AERO Inhale 2 puffs into the lungs 2 times daily 1 Inhaler 3    aspirin 81 MG chewable tablet Take 1 tablet by mouth daily 30 tablet 3    clopidogrel (PLAVIX) 75 MG tablet Take 1 tablet by mouth daily 30 tablet 3    lisinopril (PRINIVIL;ZESTRIL) 10 MG tablet Take 10 mg by mouth daily      latanoprost (XALATAN) 0.005 % ophthalmic solution Place 1 drop into both eyes nightly      senna-docusate (PERICOLACE) 8.6-50 MG per tablet Take 1 tablet by mouth daily as needed constipation      Azelastine-Fluticasone (DYMISTA) 137-50 MCG/ACT SUSP 1 Squirt      levothyroxine (SYNTHROID) 75 MCG tablet Take 75 mcg by mouth daily      pravastatin (PRAVACHOL) 40 MG tablet Take 1 tablet by mouth nightly 30 tablet 3    albuterol sulfate HFA (VENTOLIN HFA) 108 (90 Base) MCG/ACT inhaler 1 spray by Nasal route as needed       No current facility-administered medications on file prior to visit.             Allergies  Allergies   Allergen Reactions    Bactrim [Sulfamethoxazole-Trimethoprim] Hives    Sulfamethoxazole-Trimethoprim         Social   Social History Tobacco Use    Smoking status: Never Smoker    Smokeless tobacco: Never Used   Substance Use Topics    Alcohol use: No               Family History   Problem Relation Age of Onset    Diabetes Brother     Stroke Father     Coronary Art Dis Other         siblings X3          Review of Systems  No fever / chills / sweats. No weight loss. No visual change, eye pain, eye discharge. No oral lesion, sore throat, dysphagia. Denies cough / sputum. Denies chest pain, palpitations. Denies n / v / abd pain. No diarrhea. Denies joint swelling or pain. No myalgia, arthralgia. Denies focal weakness, sensory change or other neurologic symptom  No lymph node swelling or tenderness. Other than above 12systems reviewed were negative . Physical Exam  BP (!) 78/56   Pulse 97   Temp 92 °F (33.3 °C)           General Appearance: alert and oriented to person, place and time, well-developed and well-nourished, in no acute distress  Skin: warm and dry, no rash or erythema  Head: normocephalic and atraumatic  Eyes: pupils equal, round, and reactive to light  ENT: hearing grossly normal bilaterally. Neck: neck supple and non tender . Pulmonary/Chest: clear to auscultation bilaterally- no wheezes, rales or rhonchi, normal air movement, no respiratory distress  Cardiovascular: normal rate, regular rhythm, normal S1 and S2, no murmurs.   Abdomen: soft, non-tender, non-distended, mild suprapubic tenderness  Extremities: no cyanosis, clubbing   Bilateral lower extremity edema  Musculoskeletal: normal range of motion, no joint swelling, deformity or tenderness  Neurologic: no cranial nerve deficit and muscle strength normal  Port site with no erythema or tenderness  Data Review:    WBC   Date Value Ref Range Status   05/06/2020 13.6 (H) 3.5 - 11.3 k/uL Final   05/05/2020 11.5 (H) 3.5 - 11.3 k/uL Final   05/04/2020 10.2 3.5 - 11.3 k/uL Final     Hemoglobin   Date Value Ref Range Status 05/28/2020 10.3 (L) 12.0 - 16.0 g/dL Final   05/06/2020 8.1 (L) 11.9 - 15.1 g/dL Final   05/05/2020 8.1 (L) 11.9 - 15.1 g/dL Final     Hematocrit   Date Value Ref Range Status   05/28/2020 32.9 (L) 36 - 46 % Final   05/06/2020 26.6 (L) 36.3 - 47.1 % Final   05/05/2020 26.5 (L) 36.3 - 47.1 % Final     MCV   Date Value Ref Range Status   05/06/2020 101.5 82.6 - 102.9 fL Final   05/05/2020 103.9 (H) 82.6 - 102.9 fL Final   05/04/2020 99.6 82.6 - 102.9 fL Final     Platelets   Date Value Ref Range Status   05/06/2020 397 138 - 453 k/uL Final   05/05/2020 339 138 - 453 k/uL Final   05/04/2020 279 138 - 453 k/uL Final     Sodium   Date Value Ref Range Status   05/28/2020 136 135 - 144 mmol/L Final   05/05/2020 141 135 - 144 mmol/L Final   05/04/2020 146 (H) 135 - 144 mmol/L Final     Potassium   Date Value Ref Range Status   05/28/2020 3.9 3.7 - 5.3 mmol/L Final   05/05/2020 4.4 3.7 - 5.3 mmol/L Final   05/04/2020 4.0 3.7 - 5.3 mmol/L Final     Chloride   Date Value Ref Range Status   05/28/2020 97 (L) 98 - 107 mmol/L Final   05/05/2020 102 98 - 107 mmol/L Final   05/04/2020 107 98 - 107 mmol/L Final     CO2   Date Value Ref Range Status   05/28/2020 24 20 - 31 mmol/L Final   05/05/2020 26 20 - 31 mmol/L Final   05/04/2020 29 20 - 31 mmol/L Final     BUN   Date Value Ref Range Status   05/28/2020 11 8 - 23 mg/dL Final   05/05/2020 23 8 - 23 mg/dL Final   05/04/2020 28 (H) 8 - 23 mg/dL Final     CREATININE   Date Value Ref Range Status   05/28/2020 0.85 0.50 - 0.90 mg/dL Final   05/05/2020 0.69 0.50 - 0.90 mg/dL Final   05/04/2020 0.84 0.50 - 0.90 mg/dL Final     AST   Date Value Ref Range Status   04/27/2020 22 <32 U/L Final   05/23/2017 18 <32 U/L Final     Comment:     Performed at [facility]: WILLAM MCHUGH [address] [place] [address]   [phone]     07/12/2016 35 (H) <32 U/L Final     ALT   Date Value Ref Range Status   04/27/2020 14 5 - 33 U/L Final   04/25/2019 12 5 - 33 U/L Final 05/23/2017 9 5 - 33 U/L Final     Comment:     Performed at [address] [place] [address] Dr Mark Panchal. [address]   [phone]       Total Bilirubin   Date Value Ref Range Status   04/27/2020 0.22 (L) 0.3 - 1.2 mg/dL Final   07/12/2016 0.35 0.3 - 1.2 mg/dL Final   07/05/2016 0.20 (L) 0.3 - 1.2 mg/dL Final     Alkaline Phosphatase   Date Value Ref Range Status   04/27/2020 82 35 - 104 U/L Final   07/12/2016 80 35 - 104 U/L Final   07/05/2016 77 35 - 104 U/L Final     No results found for: LIPASE, AMYLASE  Protime   Date Value Ref Range Status   04/27/2020 13.0 11.8 - 14.6 sec Final   08/03/2013 10.4 9.4 - 12.6 sec Final     INR   Date Value Ref Range Status   04/27/2020 1.0  Final     Comment:           Non-therapeutic Range:     INR = 0.9-1.2  Therapeutic Range: Moderate Anticoagulant Intensity:     INR = 2.0-3.0   High Anticoagulant Intensity:     INR = 2.5-3.5           08/03/2013 1.0  Final     Comment:           Therapeutic Range: Moderate Anticoagulant Intensity:     INR = 2.0-3.0   High Anticoagulant Intensity:     INR = 2.5-3.5        [place] [id number] [phone]         Imaging Studies:                           All appropriate imaging studies and reports reviewed: Yes                 Assessment:   Recurrent UTI with history of ESBL producing bacteria  Urinary retention  T2 urothelial cell carcinoma status post  tumor resection 8/24/2020 initially followed by  5 cycles of fu /mitomycin and radiation that was completed. Status postop transurethral resection of residual bladder tumor 12/30/2020  History of DVT on anticoagulation  Coronary artery disease  Hypertension  Hyperlipidemia    Recommendations:   Patient was sent to her cardiologist office for evaluation and was advised to go to the ER if remains hypotensive.   UA with reflex to culture  Ultrasound planned by her urologist She had a follow-up with her urologist next week consider post void residual and straight cath if needed  Follow-up next week      Thank you for allowing me to participate in the care of your patient. Please feel free to contact me with any questions or concerns.      Lexus Gonzalez MD

## 2021-01-13 LAB
CULTURE: ABNORMAL
Lab: ABNORMAL
SPECIMEN DESCRIPTION: ABNORMAL

## 2021-01-15 ENCOUNTER — TELEPHONE (OUTPATIENT)
Dept: INFECTIOUS DISEASES | Age: 86
End: 2021-01-15

## 2021-01-15 RX ORDER — GRANULES FOR ORAL 3 G/1
3 POWDER ORAL
Qty: 2 EACH | Refills: 0 | Status: SHIPPED | OUTPATIENT
Start: 2021-01-15 | End: 2021-01-19

## 2021-01-15 NOTE — TELEPHONE ENCOUNTER
Lily Cochran MD 1/15/2021 2:37 PM good afternoon dr. Shawnee Cabral, your pt Baldomero Rae : 1935 is calling for her UTI results that you ordered. they are in the epic system. Please review and let me know what you would like done. Thank You Baljeet Dick Read 1/15/2021 2:49 PM 1/15/2021 3:28 PM I put an order for abxs 1/15/2021 3:42 PM please advise pt calling again Unread 1/15/2021 3:43 PM sorry we were typing same time thank you I will let the pt know Unread        pt informed.

## 2021-01-18 ENCOUNTER — TELEPHONE (OUTPATIENT)
Dept: INFECTIOUS DISEASES | Age: 86
End: 2021-01-18

## 2021-01-18 NOTE — TELEPHONE ENCOUNTER
RECEIVED A PA NOTICE FOR Fosfomycin Tromethamine 3GM packets  THIS IS IN THE PROCESS WITH [employer]

## 2021-01-19 ENCOUNTER — OFFICE VISIT (OUTPATIENT)
Dept: INFECTIOUS DISEASES | Age: 86
End: 2021-01-19
Payer: MEDICARE

## 2021-01-19 VITALS
DIASTOLIC BLOOD PRESSURE: 68 MMHG | TEMPERATURE: 98.1 F | HEIGHT: 59 IN | SYSTOLIC BLOOD PRESSURE: 125 MMHG | WEIGHT: 174 LBS | BODY MASS INDEX: 35.08 KG/M2 | HEART RATE: 94 BPM | RESPIRATION RATE: 19 BRPM | OXYGEN SATURATION: 99 %

## 2021-01-19 DIAGNOSIS — B96.29 UTI DUE TO EXTENDED-SPECTRUM BETA LACTAMASE (ESBL) PRODUCING ESCHERICHIA COLI: Primary | ICD-10-CM

## 2021-01-19 DIAGNOSIS — N39.0 UTI DUE TO EXTENDED-SPECTRUM BETA LACTAMASE (ESBL) PRODUCING ESCHERICHIA COLI: Primary | ICD-10-CM

## 2021-01-19 DIAGNOSIS — Z16.12 UTI DUE TO EXTENDED-SPECTRUM BETA LACTAMASE (ESBL) PRODUCING ESCHERICHIA COLI: Primary | ICD-10-CM

## 2021-01-19 PROCEDURE — 99214 OFFICE O/P EST MOD 30 MIN: CPT | Performed by: INTERNAL MEDICINE

## 2021-01-19 PROCEDURE — 1090F PRES/ABSN URINE INCON ASSESS: CPT | Performed by: INTERNAL MEDICINE

## 2021-01-19 PROCEDURE — G8417 CALC BMI ABV UP PARAM F/U: HCPCS | Performed by: INTERNAL MEDICINE

## 2021-01-19 PROCEDURE — G8427 DOCREV CUR MEDS BY ELIG CLIN: HCPCS | Performed by: INTERNAL MEDICINE

## 2021-01-19 PROCEDURE — G8484 FLU IMMUNIZE NO ADMIN: HCPCS | Performed by: INTERNAL MEDICINE

## 2021-01-19 PROCEDURE — G8399 PT W/DXA RESULTS DOCUMENT: HCPCS | Performed by: INTERNAL MEDICINE

## 2021-01-19 PROCEDURE — 4040F PNEUMOC VAC/ADMIN/RCVD: CPT | Performed by: INTERNAL MEDICINE

## 2021-01-19 PROCEDURE — 1123F ACP DISCUSS/DSCN MKR DOCD: CPT | Performed by: INTERNAL MEDICINE

## 2021-01-19 PROCEDURE — 1036F TOBACCO NON-USER: CPT | Performed by: INTERNAL MEDICINE

## 2021-01-19 RX ORDER — NITROFURANTOIN 25; 75 MG/1; MG/1
100 CAPSULE ORAL 2 TIMES DAILY
Qty: 4 CAPSULE | Refills: 0 | Status: SHIPPED | OUTPATIENT
Start: 2021-01-19 | End: 2021-01-21

## 2021-01-19 RX ORDER — NITROFURANTOIN 25; 75 MG/1; MG/1
100 CAPSULE ORAL 2 TIMES DAILY
Qty: 28 CAPSULE | Refills: 1 | Status: SHIPPED | OUTPATIENT
Start: 2021-01-19 | End: 2021-02-16

## 2021-01-19 NOTE — PROGRESS NOTES
Infectious disease Consult Note      Patient: Daniel Palmer  : 1935  Acct#:  [de-identified]     Date:  2021    Subjective:       History of Present Illness  Patient is a 80 y. o.female    Chief Complaint   Patient presents with    Follow-up     1 week follow up   The patient had T2 urothelial cell carcinoma status post  tumor resection 2020 initially followed by  5 cycles of fu /mitomycin and radiation that was completed. Status postop transurethral resection of residual bladder tumor 2020   Urine culture 2020 grew  E. coli that was sensitive to ampicillin/ sulbactam and Klebsiella ([address] was detected)  She was treated with Unasyn during her hospitalization, discharged on Augmentin. History of left lower extremity DVT, coronary artery disease, hypertension, hyperlipidemia, UTIs with history of ESBL, chronic renal disease, hydronephrosis. Prior bladder suspension surgery in 1970's    Interval history 2021  She still complaining of burning with urination, dysuria, incontinence and feeling of incomplete emptying, denied fever or chills, denied nausea or vomiting, no diarrhea, no other complaints. Urine culture on 2021 grew ESBL producing E. Coli  Fosfomycin was called to her pharmacy   Past Medical History:   Diagnosis Date    Coronary artery disease     ESBL (extended spectrum beta-lactamase) producing bacteria infection 2015    E.  Coli urine    HH (hiatus hernia)     Hyperkalemia 2020    Hyperlipidemia     Hypertension     Hypotension 2020    Thyroid disease       Past Surgical History:   Procedure Laterality Date    ANGIOPLASTY      BLADDER SUSPENSION      CATARACT REMOVAL WITH IMPLANT Bilateral     TOTAL KNEE ARTHROPLASTY Right           Admission Meds  Current Outpatient Medications on File Prior to Visit   Medication Sig Dispense Refill  fosfomycin tromethamine (MONUROL) 3 g PACK Take 1 packet by mouth every 3 days for 2 doses 2 each 0    aspirin EC 81 MG EC tablet Take 81 mg by mouth daily      acetaminophen (TYLENOL) 500 MG tablet Take 1,000 mg by mouth every 6 hours as needed for Pain      rivaroxaban (XARELTO) 15 MG TABS tablet Take 1 tablet by mouth daily (with breakfast) 30 tablet 1    albuterol (PROVENTIL) (2.5 MG/3ML) 0.083% nebulizer solution Take 3 mLs by nebulization every 6 hours as needed for Wheezing 120 each 3    budesonide-formoterol (SYMBICORT) 80-4.5 MCG/ACT AERO Inhale 2 puffs into the lungs 2 times daily 1 Inhaler 3    aspirin 81 MG chewable tablet Take 1 tablet by mouth daily 30 tablet 3    clopidogrel (PLAVIX) 75 MG tablet Take 1 tablet by mouth daily 30 tablet 3    lisinopril (PRINIVIL;ZESTRIL) 10 MG tablet Take 10 mg by mouth daily      latanoprost (XALATAN) 0.005 % ophthalmic solution Place 1 drop into both eyes nightly      senna-docusate (PERICOLACE) 8.6-50 MG per tablet Take 1 tablet by mouth daily as needed constipation      Azelastine-Fluticasone (DYMISTA) 137-50 MCG/ACT SUSP 1 Squirt      levothyroxine (SYNTHROID) 75 MCG tablet Take 75 mcg by mouth daily      pravastatin (PRAVACHOL) 40 MG tablet Take 1 tablet by mouth nightly 30 tablet 3    fesoterodine (TOVIAZ) 4 MG TB24 ER tablet Take 4 mg by mouth daily      furosemide (LASIX) 20 MG tablet Take 20 mg by mouth daily       No current facility-administered medications on file prior to visit. Allergies  Allergies   Allergen Reactions    Bactrim [Sulfamethoxazole-Trimethoprim] Hives    Sulfamethoxazole-Trimethoprim         Social   Social History     Tobacco Use    Smoking status: Never Smoker    Smokeless tobacco: Never Used   Substance Use Topics    Alcohol use:  No               Family History   Problem Relation Age of Onset    Diabetes Brother     Stroke Father     Coronary Art Dis Other         siblings X3 Review of Systems  No fever / chills / sweats. No weight loss. No visual change, eye pain, eye discharge. No oral lesion, sore throat, dysphagia. Denies cough / sputum. Denies chest pain, palpitations. Denies n / v / abd pain. No diarrhea. Denies joint swelling or pain. No myalgia, arthralgia. Denies focal weakness, sensory change or other neurologic symptom  No lymph node swelling or tenderness. Other than above 12systems reviewed were negative . Physical Exam  /68   Pulse 94   Temp 98.1 °F (36.7 °C)   Resp 19   Ht 4' 11\" (1.499 m)   Wt 174 lb (78.9 kg)   SpO2 99%   BMI 35.14 kg/m²           General Appearance: alert and oriented to person, place and time, well-developed and well-nourished, in no acute distress  Skin: warm and dry, no rash or erythema  Head: normocephalic and atraumatic  Eyes: pupils equal, round, and reactive to light  ENT: hearing grossly normal bilaterally. Neck: neck supple and non tender . Pulmonary/Chest: clear to auscultation bilaterally- no wheezes, rales or rhonchi, normal air movement, no respiratory distress  Cardiovascular: normal rate, regular rhythm, normal S1 and S2, no murmurs.   Abdomen: soft, non-tender, non-distended, mild suprapubic tenderness  Extremities: no cyanosis, clubbing   Bilateral lower extremity edema  Musculoskeletal: normal range of motion, no joint swelling, deformity or tenderness  Neurologic: no cranial nerve deficit and muscle strength normal  Port site with no erythema or tenderness  Data Review:    WBC   Date Value Ref Range Status   05/06/2020 13.6 (H) 3.5 - 11.3 k/uL Final   05/05/2020 11.5 (H) 3.5 - 11.3 k/uL Final   05/04/2020 10.2 3.5 - 11.3 k/uL Final     Hemoglobin   Date Value Ref Range Status   05/28/2020 10.3 (L) 12.0 - 16.0 g/dL Final   05/06/2020 8.1 (L) 11.9 - 15.1 g/dL Final   05/05/2020 8.1 (L) 11.9 - 15.1 g/dL Final     Hematocrit   Date Value Ref Range Status   05/28/2020 32.9 (L) 36 - 46 % Final 05/06/2020 26.6 (L) 36.3 - 47.1 % Final   05/05/2020 26.5 (L) 36.3 - 47.1 % Final     MCV   Date Value Ref Range Status   05/06/2020 101.5 82.6 - 102.9 fL Final   05/05/2020 103.9 (H) 82.6 - 102.9 fL Final   05/04/2020 99.6 82.6 - 102.9 fL Final     Platelets   Date Value Ref Range Status   05/06/2020 397 138 - 453 k/uL Final   05/05/2020 339 138 - 453 k/uL Final   05/04/2020 279 138 - 453 k/uL Final     Sodium   Date Value Ref Range Status   05/28/2020 136 135 - 144 mmol/L Final   05/05/2020 141 135 - 144 mmol/L Final   05/04/2020 146 (H) 135 - 144 mmol/L Final     Potassium   Date Value Ref Range Status   05/28/2020 3.9 3.7 - 5.3 mmol/L Final   05/05/2020 4.4 3.7 - 5.3 mmol/L Final   05/04/2020 4.0 3.7 - 5.3 mmol/L Final     Chloride   Date Value Ref Range Status   05/28/2020 97 (L) 98 - 107 mmol/L Final   05/05/2020 102 98 - 107 mmol/L Final   05/04/2020 107 98 - 107 mmol/L Final     CO2   Date Value Ref Range Status   05/28/2020 24 20 - 31 mmol/L Final   05/05/2020 26 20 - 31 mmol/L Final   05/04/2020 29 20 - 31 mmol/L Final     BUN   Date Value Ref Range Status   05/28/2020 11 8 - 23 mg/dL Final   05/05/2020 23 8 - 23 mg/dL Final   05/04/2020 28 (H) 8 - 23 mg/dL Final     CREATININE   Date Value Ref Range Status   05/28/2020 0.85 0.50 - 0.90 mg/dL Final   05/05/2020 0.69 0.50 - 0.90 mg/dL Final   05/04/2020 0.84 0.50 - 0.90 mg/dL Final     AST   Date Value Ref Range Status   04/27/2020 22 <32 U/L Final   05/23/2017 18 <32 U/L Final     Comment:     Performed at [facility]: WILLAM MCHUGH [address] [place], [address] [place] [address]   [phone]     07/12/2016 35 (H) <32 U/L Final     ALT   Date Value Ref Range Status   04/27/2020 14 5 - 33 U/L Final   04/25/2019 12 5 - 33 U/L Final   05/23/2017 9 5 - 33 U/L Final     Comment:     Performed at [facility]: WILLAM MCHUGH [address]  [place], [address] [place] [address]   [phone]       Total Bilirubin   Date Value Ref Range Status

## 2021-02-09 ENCOUNTER — TELEPHONE (OUTPATIENT)
Dept: INFECTIOUS DISEASES | Age: 86
End: 2021-02-09

## 2021-02-09 NOTE — TELEPHONE ENCOUNTER
The patient called and stated that she went to [facility] and they did a urine culture on her and informed her to contact our office for further management of UTI some results are in care everywhere Please advise

## 2021-02-18 ENCOUNTER — HOSPITAL ENCOUNTER (OUTPATIENT)
Age: 86
Setting detail: OBSERVATION
Discharge: HOME | End: 2021-02-19
Attending: EMERGENCY MEDICINE | Admitting: FAMILY MEDICINE
Payer: MEDICARE

## 2021-02-18 ENCOUNTER — TELEPHONE (OUTPATIENT)
Dept: INFECTIOUS DISEASES | Age: 86
End: 2021-02-18

## 2021-02-18 DIAGNOSIS — N39.0 URINARY TRACT INFECTION IN FEMALE: Primary | ICD-10-CM

## 2021-02-18 PROBLEM — D50.9 IRON DEFICIENCY ANEMIA: Status: ACTIVE | Noted: 2021-02-18

## 2021-02-18 PROBLEM — N30.01 ACUTE CYSTITIS WITH HEMATURIA: Status: ACTIVE | Noted: 2021-02-18

## 2021-02-18 PROBLEM — N18.9 ACUTE KIDNEY INJURY SUPERIMPOSED ON CKD (HCC): Status: ACTIVE | Noted: 2020-04-29

## 2021-02-18 PROBLEM — Z79.01 CURRENT USE OF LONG TERM ANTICOAGULATION: Status: ACTIVE | Noted: 2021-02-18

## 2021-02-18 PROBLEM — N18.30 STAGE 3 CHRONIC KIDNEY DISEASE (HCC): Status: ACTIVE | Noted: 2021-02-18

## 2021-02-18 PROBLEM — N30.00 ACUTE CYSTITIS WITHOUT HEMATURIA: Status: ACTIVE | Noted: 2021-02-18

## 2021-02-18 LAB
-: ABNORMAL
ABSOLUTE EOS #: 0.1 K/UL (ref 0–0.4)
ABSOLUTE IMMATURE GRANULOCYTE: ABNORMAL K/UL (ref 0–0.3)
ABSOLUTE LYMPH #: 1.3 K/UL (ref 1–4.8)
ABSOLUTE MONO #: 1 K/UL (ref 0.1–1.3)
AMORPHOUS: ABNORMAL
ANION GAP SERPL CALCULATED.3IONS-SCNC: 13 MMOL/L (ref 9–17)
BACTERIA: ABNORMAL
BASOPHILS # BLD: 1 % (ref 0–2)
BASOPHILS ABSOLUTE: 0.1 K/UL (ref 0–0.2)
BILIRUBIN URINE: NEGATIVE
BUN BLDV-MCNC: 23 MG/DL (ref 8–23)
BUN/CREAT BLD: ABNORMAL (ref 9–20)
CALCIUM SERPL-MCNC: 9.5 MG/DL (ref 8.6–10.4)
CASTS UA: ABNORMAL /LPF
CHLORIDE BLD-SCNC: 99 MMOL/L (ref 98–107)
CO2: 21 MMOL/L (ref 20–31)
COLOR: YELLOW
COMMENT UA: ABNORMAL
CREAT SERPL-MCNC: 2.01 MG/DL (ref 0.5–0.9)
CRYSTALS, UA: ABNORMAL /HPF
DIFFERENTIAL TYPE: ABNORMAL
EOSINOPHILS RELATIVE PERCENT: 2 % (ref 0–4)
EPITHELIAL CELLS UA: ABNORMAL /HPF
GFR AFRICAN AMERICAN: 29 ML/MIN
GFR NON-AFRICAN AMERICAN: 24 ML/MIN
GFR SERPL CREATININE-BSD FRML MDRD: ABNORMAL ML/MIN/{1.73_M2}
GFR SERPL CREATININE-BSD FRML MDRD: ABNORMAL ML/MIN/{1.73_M2}
GLUCOSE BLD-MCNC: 108 MG/DL (ref 70–99)
GLUCOSE URINE: NEGATIVE
HCT VFR BLD CALC: 28 % (ref 36–46)
HEMOGLOBIN: 9.2 G/DL (ref 12–16)
IMMATURE GRANULOCYTES: ABNORMAL %
KETONES, URINE: NEGATIVE
LACTIC ACID, WHOLE BLOOD: NORMAL MMOL/L (ref 0.7–2.1)
LACTIC ACID: 1 MMOL/L (ref 0.5–2.2)
LEUKOCYTE ESTERASE, URINE: ABNORMAL
LYMPHOCYTES # BLD: 15 % (ref 24–44)
MCH RBC QN AUTO: 31.4 PG (ref 26–34)
MCHC RBC AUTO-ENTMCNC: 32.8 G/DL (ref 31–37)
MCV RBC AUTO: 95.8 FL (ref 80–100)
MONOCYTES # BLD: 11 % (ref 1–7)
MUCUS: ABNORMAL
NITRITE, URINE: NEGATIVE
NRBC AUTOMATED: ABNORMAL PER 100 WBC
OTHER OBSERVATIONS UA: ABNORMAL
PDW BLD-RTO: 14 % (ref 11.5–14.9)
PH UA: 7 (ref 5–8)
PLATELET # BLD: 369 K/UL (ref 150–450)
PLATELET ESTIMATE: ABNORMAL
PMV BLD AUTO: 6 FL (ref 6–12)
POTASSIUM SERPL-SCNC: 4.9 MMOL/L (ref 3.7–5.3)
PROTEIN UA: ABNORMAL
RBC # BLD: 2.92 M/UL (ref 4–5.2)
RBC # BLD: ABNORMAL 10*6/UL
RBC UA: ABNORMAL /HPF
RENAL EPITHELIAL, UA: ABNORMAL /HPF
SEG NEUTROPHILS: 71 % (ref 36–66)
SEGMENTED NEUTROPHILS ABSOLUTE COUNT: 6.2 K/UL (ref 1.3–9.1)
SODIUM BLD-SCNC: 133 MMOL/L (ref 135–144)
SPECIFIC GRAVITY UA: 1.01 (ref 1–1.03)
TRICHOMONAS: ABNORMAL
TURBIDITY: ABNORMAL
URINE HGB: ABNORMAL
UROBILINOGEN, URINE: NORMAL
WBC # BLD: 8.7 K/UL (ref 3.5–11)
WBC # BLD: ABNORMAL 10*3/UL
WBC UA: ABNORMAL /HPF
YEAST: ABNORMAL

## 2021-02-18 PROCEDURE — 99283 EMERGENCY DEPT VISIT LOW MDM: CPT

## 2021-02-18 PROCEDURE — 94664 DEMO&/EVAL PT USE INHALER: CPT

## 2021-02-18 PROCEDURE — G0378 HOSPITAL OBSERVATION PER HR: HCPCS

## 2021-02-18 PROCEDURE — 81001 URINALYSIS AUTO W/SCOPE: CPT

## 2021-02-18 PROCEDURE — 96372 THER/PROPH/DIAG INJ SC/IM: CPT

## 2021-02-18 PROCEDURE — 6370000000 HC RX 637 (ALT 250 FOR IP): Performed by: FAMILY MEDICINE

## 2021-02-18 PROCEDURE — 6360000002 HC RX W HCPCS: Performed by: FAMILY MEDICINE

## 2021-02-18 PROCEDURE — 2580000003 HC RX 258: Performed by: EMERGENCY MEDICINE

## 2021-02-18 PROCEDURE — 87086 URINE CULTURE/COLONY COUNT: CPT

## 2021-02-18 PROCEDURE — 2060000000 HC ICU INTERMEDIATE R&B

## 2021-02-18 PROCEDURE — 87186 SC STD MICRODIL/AGAR DIL: CPT

## 2021-02-18 PROCEDURE — 94640 AIRWAY INHALATION TREATMENT: CPT

## 2021-02-18 PROCEDURE — 85025 COMPLETE CBC W/AUTO DIFF WBC: CPT

## 2021-02-18 PROCEDURE — 80048 BASIC METABOLIC PNL TOTAL CA: CPT

## 2021-02-18 PROCEDURE — 36415 COLL VENOUS BLD VENIPUNCTURE: CPT

## 2021-02-18 PROCEDURE — 83605 ASSAY OF LACTIC ACID: CPT

## 2021-02-18 PROCEDURE — 96365 THER/PROPH/DIAG IV INF INIT: CPT

## 2021-02-18 PROCEDURE — 87088 URINE BACTERIA CULTURE: CPT

## 2021-02-18 PROCEDURE — 87040 BLOOD CULTURE FOR BACTERIA: CPT

## 2021-02-18 PROCEDURE — 2580000003 HC RX 258: Performed by: FAMILY MEDICINE

## 2021-02-18 RX ORDER — LATANOPROST 50 UG/ML
1 SOLUTION/ DROPS OPHTHALMIC NIGHTLY
Status: DISCONTINUED | OUTPATIENT
Start: 2021-02-18 | End: 2021-02-19 | Stop reason: HOSPADM

## 2021-02-18 RX ORDER — MAGNESIUM SULFATE 1 G/100ML
1000 INJECTION INTRAVENOUS PRN
Status: DISCONTINUED | OUTPATIENT
Start: 2021-02-18 | End: 2021-02-18

## 2021-02-18 RX ORDER — PRAVASTATIN SODIUM 80 MG/1
80 TABLET ORAL DAILY
COMMUNITY

## 2021-02-18 RX ORDER — CLOPIDOGREL BISULFATE 75 MG/1
75 TABLET ORAL DAILY
Status: DISCONTINUED | OUTPATIENT
Start: 2021-02-18 | End: 2021-02-19 | Stop reason: HOSPADM

## 2021-02-18 RX ORDER — HYDROCODONE BITARTRATE AND ACETAMINOPHEN 5; 325 MG/1; MG/1
1 TABLET ORAL 2 TIMES DAILY PRN
COMMUNITY

## 2021-02-18 RX ORDER — LEVOTHYROXINE SODIUM 0.07 MG/1
75 TABLET ORAL DAILY
Status: DISCONTINUED | OUTPATIENT
Start: 2021-02-18 | End: 2021-02-19 | Stop reason: HOSPADM

## 2021-02-18 RX ORDER — HYDROCODONE BITARTRATE AND ACETAMINOPHEN 5; 325 MG/1; MG/1
1 TABLET ORAL NIGHTLY
Status: DISCONTINUED | OUTPATIENT
Start: 2021-02-18 | End: 2021-02-19 | Stop reason: HOSPADM

## 2021-02-18 RX ORDER — POTASSIUM CHLORIDE 20 MEQ/1
40 TABLET, EXTENDED RELEASE ORAL PRN
Status: DISCONTINUED | OUTPATIENT
Start: 2021-02-18 | End: 2021-02-18

## 2021-02-18 RX ORDER — ASPIRIN 81 MG/1
81 TABLET, CHEWABLE ORAL DAILY
Status: DISCONTINUED | OUTPATIENT
Start: 2021-02-18 | End: 2021-02-19 | Stop reason: HOSPADM

## 2021-02-18 RX ORDER — IPRATROPIUM BROMIDE AND ALBUTEROL SULFATE 2.5; .5 MG/3ML; MG/3ML
1 SOLUTION RESPIRATORY (INHALATION)
Status: DISCONTINUED | OUTPATIENT
Start: 2021-02-18 | End: 2021-02-18

## 2021-02-18 RX ORDER — SODIUM CHLORIDE 0.9 % (FLUSH) 0.9 %
10 SYRINGE (ML) INJECTION PRN
Status: DISCONTINUED | OUTPATIENT
Start: 2021-02-18 | End: 2021-02-19 | Stop reason: HOSPADM

## 2021-02-18 RX ORDER — LISINOPRIL 10 MG/1
10 TABLET ORAL DAILY
Status: DISCONTINUED | OUTPATIENT
Start: 2021-02-18 | End: 2021-02-19 | Stop reason: HOSPADM

## 2021-02-18 RX ORDER — BUDESONIDE AND FORMOTEROL FUMARATE DIHYDRATE 80; 4.5 UG/1; UG/1
2 AEROSOL RESPIRATORY (INHALATION) 2 TIMES DAILY
Status: DISCONTINUED | OUTPATIENT
Start: 2021-02-18 | End: 2021-02-19 | Stop reason: HOSPADM

## 2021-02-18 RX ORDER — IPRATROPIUM BROMIDE AND ALBUTEROL SULFATE 2.5; .5 MG/3ML; MG/3ML
1 SOLUTION RESPIRATORY (INHALATION)
Status: DISCONTINUED | OUTPATIENT
Start: 2021-02-18 | End: 2021-02-19 | Stop reason: HOSPADM

## 2021-02-18 RX ORDER — FAMOTIDINE 10 MG
20 TABLET ORAL 2 TIMES DAILY
COMMUNITY
End: 2021-03-31

## 2021-02-18 RX ORDER — FUROSEMIDE 20 MG/1
20 TABLET ORAL DAILY
Status: DISCONTINUED | OUTPATIENT
Start: 2021-02-18 | End: 2021-02-19 | Stop reason: HOSPADM

## 2021-02-18 RX ORDER — ACETAMINOPHEN 325 MG/1
650 TABLET ORAL EVERY 6 HOURS PRN
Status: DISCONTINUED | OUTPATIENT
Start: 2021-02-18 | End: 2021-02-19 | Stop reason: HOSPADM

## 2021-02-18 RX ORDER — SODIUM CHLORIDE 0.9 % (FLUSH) 0.9 %
10 SYRINGE (ML) INJECTION EVERY 12 HOURS SCHEDULED
Status: DISCONTINUED | OUTPATIENT
Start: 2021-02-18 | End: 2021-02-19 | Stop reason: HOSPADM

## 2021-02-18 RX ORDER — SODIUM CHLORIDE 9 MG/ML
INJECTION, SOLUTION INTRAVENOUS CONTINUOUS
Status: DISCONTINUED | OUTPATIENT
Start: 2021-02-18 | End: 2021-02-19 | Stop reason: HOSPADM

## 2021-02-18 RX ORDER — LANOLIN ALCOHOL/MO/W.PET/CERES
1000 CREAM (GRAM) TOPICAL DAILY
Status: DISCONTINUED | OUTPATIENT
Start: 2021-02-18 | End: 2021-02-19 | Stop reason: HOSPADM

## 2021-02-18 RX ORDER — PROMETHAZINE HYDROCHLORIDE 25 MG/1
12.5 TABLET ORAL EVERY 6 HOURS PRN
Status: DISCONTINUED | OUTPATIENT
Start: 2021-02-18 | End: 2021-02-19 | Stop reason: HOSPADM

## 2021-02-18 RX ORDER — PRAVASTATIN SODIUM 40 MG
40 TABLET ORAL NIGHTLY
Status: DISCONTINUED | OUTPATIENT
Start: 2021-02-18 | End: 2021-02-19 | Stop reason: HOSPADM

## 2021-02-18 RX ORDER — POTASSIUM CHLORIDE 7.45 MG/ML
10 INJECTION INTRAVENOUS PRN
Status: DISCONTINUED | OUTPATIENT
Start: 2021-02-18 | End: 2021-02-18

## 2021-02-18 RX ORDER — ONDANSETRON 2 MG/ML
4 INJECTION INTRAMUSCULAR; INTRAVENOUS EVERY 6 HOURS PRN
Status: DISCONTINUED | OUTPATIENT
Start: 2021-02-18 | End: 2021-02-19 | Stop reason: HOSPADM

## 2021-02-18 RX ORDER — SENNA AND DOCUSATE SODIUM 50; 8.6 MG/1; MG/1
1 TABLET, FILM COATED ORAL DAILY PRN
Status: DISCONTINUED | OUTPATIENT
Start: 2021-02-18 | End: 2021-02-19 | Stop reason: HOSPADM

## 2021-02-18 RX ORDER — FAMOTIDINE 20 MG/1
10 TABLET, FILM COATED ORAL 2 TIMES DAILY
Status: DISCONTINUED | OUTPATIENT
Start: 2021-02-18 | End: 2021-02-19 | Stop reason: HOSPADM

## 2021-02-18 RX ORDER — TROSPIUM CHLORIDE 20 MG/1
20 TABLET, FILM COATED ORAL NIGHTLY
Status: DISCONTINUED | OUTPATIENT
Start: 2021-02-18 | End: 2021-02-18

## 2021-02-18 RX ORDER — FAMOTIDINE 20 MG/1
20 TABLET, FILM COATED ORAL 2 TIMES DAILY
COMMUNITY

## 2021-02-18 RX ORDER — 0.9 % SODIUM CHLORIDE 0.9 %
1000 INTRAVENOUS SOLUTION INTRAVENOUS ONCE
Status: COMPLETED | OUTPATIENT
Start: 2021-02-18 | End: 2021-02-18

## 2021-02-18 RX ORDER — FERROUS SULFATE 325(65) MG
325 TABLET ORAL 2 TIMES DAILY WITH MEALS
Status: DISCONTINUED | OUTPATIENT
Start: 2021-02-18 | End: 2021-02-19 | Stop reason: HOSPADM

## 2021-02-18 RX ORDER — ACETAMINOPHEN 650 MG/1
650 SUPPOSITORY RECTAL EVERY 6 HOURS PRN
Status: DISCONTINUED | OUTPATIENT
Start: 2021-02-18 | End: 2021-02-19 | Stop reason: HOSPADM

## 2021-02-18 RX ADMIN — SODIUM CHLORIDE 1000 ML: 9 INJECTION, SOLUTION INTRAVENOUS at 14:38

## 2021-02-18 RX ADMIN — IPRATROPIUM BROMIDE AND ALBUTEROL SULFATE 1 AMPULE: .5; 3 SOLUTION RESPIRATORY (INHALATION) at 19:47

## 2021-02-18 RX ADMIN — PRAVASTATIN SODIUM 40 MG: 40 TABLET ORAL at 21:22

## 2021-02-18 RX ADMIN — Medication 2 PUFF: at 21:30

## 2021-02-18 RX ADMIN — LATANOPROST 1 DROP: 50 SOLUTION OPHTHALMIC at 23:45

## 2021-02-18 RX ADMIN — DOCUSATE SODIUM 50MG AND SENNOSIDES 8.6MG 1 TABLET: 8.6; 5 TABLET, FILM COATED ORAL at 21:22

## 2021-02-18 RX ADMIN — SODIUM CHLORIDE: 9 INJECTION, SOLUTION INTRAVENOUS at 17:30

## 2021-02-18 RX ADMIN — ENOXAPARIN SODIUM 30 MG: 30 INJECTION, SOLUTION INTRAVENOUS; SUBCUTANEOUS at 16:40

## 2021-02-18 RX ADMIN — HYDROCODONE BITARTRATE AND ACETAMINOPHEN 1 TABLET: 5; 325 TABLET ORAL at 21:23

## 2021-02-18 RX ADMIN — FERROUS SULFATE TAB 325 MG (65 MG ELEMENTAL FE) 325 MG: 325 (65 FE) TAB at 16:41

## 2021-02-18 RX ADMIN — Medication 10 ML: at 21:30

## 2021-02-18 RX ADMIN — MEROPENEM 1000 MG: 1 INJECTION, POWDER, FOR SOLUTION INTRAVENOUS at 16:40

## 2021-02-18 RX ADMIN — FAMOTIDINE 10 MG: 20 TABLET ORAL at 21:23

## 2021-02-18 ASSESSMENT — ENCOUNTER SYMPTOMS
BACK PAIN: 0
COUGH: 0
NAUSEA: 0
EYE REDNESS: 0
BLOOD IN STOOL: 0
COLOR CHANGE: 0
VOMITING: 0
ABDOMINAL PAIN: 0
CONSTIPATION: 0
EYE ITCHING: 0
DIARRHEA: 0
SORE THROAT: 0
CHEST TIGHTNESS: 0
TROUBLE SWALLOWING: 0
SHORTNESS OF BREATH: 0

## 2021-02-18 ASSESSMENT — PAIN SCALES - GENERAL: PAINLEVEL_OUTOF10: 8

## 2021-02-18 NOTE — CARE COORDINATION
CASE MANAGEMENT NOTE:    Admission Date:  2/18/2021 Prince Cueto is a 80 y.o.  female    Admitted for : Acute cystitis without hematuria [N30.00]    Met with:  Patient    PCP:  Agapito Murphy                                Insurance:  Medicare      Current Residence/ Living Arrangements:  independently at home w/         Current Services PTA:  HX of Bladder CA, Last Chemo/Rad end of last year. Having Botox, in Bladder March 3, has Pre-op testing this Tuesday. Is patient agreeable to VNS: Yes    Freedom of choice provided:  Yes    List of [address] provided: No    VNS chosen:  Yes, [place], notified of referral, he will follow, He will pull all information from Epic    DME:  straight cane and walker, GB    Home Oxygen: No    Nebulizer: No    CPAP/BIPAP: No    Supplier: N/A    Potential Assistance Needed: Yes, Follow for possible IV antibiotics    SNF needed: No    Freedom of choice and list provided: No    Pharmacy:  [facility] on [facility]       Does Patient want to use MEDS to BEDS? No    Is patient currently receiving oral anticoagulation therapy? Was on Xarelto, PTA, States \"they took me off of that\". Is the Patient an [facility] with Readmission Risk Score greater than 14%? No  If yes, pt needs a follow up appointment made within 7 days. Family Members/Caregivers that pt would like involved in their care:    Yes    If yes, list name here:  , Satya Gibson, Bettyece, Trudi    Transportation Provider:  Patient             Is patient in Isolation/One on One/Altered Mental Status? Yes  If yes, skip next question. If no, would they like an I-Pad to  use? No  If yes, call [phone]. Discharge Plan:  2/18/21 Medicare Pt. Lives in [facility], w/ . DME cane, walker, GB. HX of Bladder CA, Has Port, Last Chemo/Rad at [address] end of year. Having Botox procedure in bladder on March 3, Pre-Op testing this Tuesday. IV Merrem, Notified, [place], from [place] to follow.  Agreeable to MARIO, Veronique Rodriguez, is following. Xarelto in Past, PT/OT. ID on board, will follow for all rec/needs.  Vasyl will need signed/completed, if needed//KB                 Electronically signed by: Alicia Otero RN on 2/18/2021 at 3:49 PM

## 2021-02-18 NOTE — CARE COORDINATION
Writer faxed Face sheet to [employer], to have them run benefits for Iv Merrem, 500 mg, Q12. [place], notified, to follow. Writer notified, Alecia Leon, from [facility], Zelalem Pearl, of referral, he will follow in [facility].

## 2021-02-18 NOTE — H&P
History and Physical      Name: Sherry Hutchison  MRN: [id number]     Acct: [de-identified]  Room: [facility]    Admit Date: 2/18/2021  PCP: Amanda Gómez      Chief Complaint:     Chief Complaint   Patient presents with    Urinary Tract Infection       History Obtained From:     patient, electronic medical record    History of Present Illness:      Sherry Hutchison is a  80 y.o.  female with a past medical history of hypertension, bladder cancer s/p chemoradiation anemia CKD, multidrug-resistant UTI presents with Urinary Tract Infection  Patient states that she has been having symptoms of UTI dysuria hesitancy frequency and has completed an antibiotic course per ID without any alleviation of symptoms. Today Dr. Galo Garcia per ID Jabari Valencia advised to report to the ER for evaluation of dysuria hesitancy and frequency. Patient denies abdominal pain flank pain nausea vomiting chest pain shortness of breath fever or chills    Past Medical History:     Past Medical History:   Diagnosis Date    Coronary artery disease     ESBL (extended spectrum beta-lactamase) producing bacteria infection 6/30/2015    E. Coli urine    HH (hiatus hernia)     Hyperkalemia 4/29/2020    Hyperlipidemia     Hypertension     Hypotension 4/29/2020    Thyroid disease         Past Surgical History:     Past Surgical History:   Procedure Laterality Date    ANGIOPLASTY      BLADDER SUSPENSION      CATARACT REMOVAL WITH IMPLANT Bilateral     TOTAL KNEE ARTHROPLASTY Right         Medications Prior to Admission:       Prior to Admission medications    Medication Sig Start Date End Date Taking? Authorizing Provider   pravastatin (PRAVACHOL) 80 MG tablet Take 80 mg by mouth daily   Yes Historical Provider, MD   HYDROcodone-acetaminophen (NORCO) 5-325 MG per tablet Take 1 tablet by mouth 2 times daily as needed for Pain.    Yes Historical Provider, MD   famotidine (PEPCID) 10 MG tablet Take 20 mg by mouth 2 times daily    Yes Historical Provider, MD   famotidine (PEPCID) 20 MG tablet Take 20 mg by mouth 2 times daily   Yes Historical Provider, MD   furosemide (LASIX) 20 MG tablet Take 20 mg by mouth daily   Yes Historical Provider, MD   acetaminophen (TYLENOL) 500 MG tablet Take 1,000 mg by mouth every 6 hours as needed for Pain   Yes Historical Provider, MD   albuterol (PROVENTIL) (2.5 MG/3ML) 0.083% nebulizer solution Take 3 mLs by nebulization every 6 hours as needed for Wheezing 5/6/20  Yes Lachelle Jeffers MD   budesonide-formoterol (SYMBICORT) 80-4.5 MCG/ACT AERO Inhale 2 puffs into the lungs 2 times daily 5/6/20  Yes Lachelle Jeffers MD   aspirin 81 MG chewable tablet Take 1 tablet by mouth daily 5/12/20  Yes Lachelle Jeffers MD   clopidogrel (PLAVIX) 75 MG tablet Take 1 tablet by mouth daily 5/12/20  Yes Lachelle Jeffers MD   lisinopril (PRINIVIL;ZESTRIL) 5 MG tablet Take 5 mg by mouth daily    Yes Historical Provider, MD   latanoprost (XALATAN) 0.005 % ophthalmic solution Place 1 drop into both eyes nightly   Yes Historical Provider, MD   senna-docusate (Brady Whyte) 8.6-50 MG per tablet Take 1 tablet by mouth daily as needed constipation   Yes Historical Provider, MD   levothyroxine (SYNTHROID) 75 MCG tablet Take 75 mcg by mouth daily   Yes Historical Provider, MD        Allergies:       Bactrim [sulfamethoxazole-trimethoprim], Gentamycin [gentamicin], and Sulfamethoxazole-trimethoprim    Social History:     Tobacco:    reports that she has never smoked. She has never used smokeless tobacco.  Alcohol:      reports no history of alcohol use. Drug Use:  reports no history of drug use. Family History:     Family History   Problem Relation Age of Onset    Diabetes Brother     Stroke Father     Coronary Art Dis Other         siblings X3       Review of Systems:     Positive and Negative as described in HPI   all 10 systems are reviewed and negative except as Noted      Review of Systems   Constitutional: Negative for chills and fatigue.    HENT: Negative for abdominal tenderness. There is no right CVA tenderness or left CVA tenderness. Musculoskeletal:         General: No deformity. Right lower leg: No edema. Left lower leg: No edema. Skin:     General: Skin is warm. Capillary Refill: Capillary refill takes less than 2 seconds. Coloration: Skin is not jaundiced. Neurological:      General: No focal deficit present. Mental Status: She is alert. Mental status is at baseline. Psychiatric:         Mood and Affect: Mood normal.         Behavior: Behavior normal.               Data:     Recent Results (from the past 24 hour(s))   Urinalysis Reflex to Culture    Collection Time: 02/18/21  1:34 PM    Specimen: Urine, clean catch   Result Value Ref Range    Color, UA YELLOW YELLOW    Turbidity UA CLOUDY (A) CLEAR    Glucose, Ur NEGATIVE NEGATIVE    Bilirubin Urine NEGATIVE NEGATIVE    Ketones, Urine NEGATIVE NEGATIVE    Specific Gravity, UA 1.006 1.000 - 1.030    Urine Hgb MOD (A) NEGATIVE    pH, UA 7.0 5.0 - 8.0    Protein, UA 1+ (A) NEGATIVE    Urobilinogen, Urine Normal Normal    Nitrite, Urine NEGATIVE NEGATIVE    Leukocyte Esterase, Urine LARGE (A) NEGATIVE    Urinalysis Comments NOT REPORTED    Microscopic Urinalysis    Collection Time: 02/18/21  1:34 PM   Result Value Ref Range    -          WBC, UA TOO NUMEROUS TO COUNT /HPF    RBC, UA 0 TO 2 /HPF    Casts UA NOT REPORTED /LPF    Crystals, UA NOT REPORTED None /HPF    Epithelial Cells UA 5 TO 10 /HPF    Renal Epithelial, UA NOT REPORTED 0 /HPF    Bacteria, UA MODERATE (A) None    Mucus, UA NOT REPORTED None    Trichomonas, UA NOT REPORTED None    Amorphous, UA NOT REPORTED None    Other Observations UA NOT REPORTED NOT REQ.     Yeast, UA NOT REPORTED None   CBC Auto Differential    Collection Time: 02/18/21  2:06 PM   Result Value Ref Range    WBC 8.7 3.5 - 11.0 k/uL    RBC 2.92 (L) 4.0 - 5.2 m/uL    Hemoglobin 9.2 (L) 12.0 - 16.0 g/dL    Hematocrit 28.0 (L) 36 - 46 %    MCV 95.8 80 - 100 fL    MCH 31.4 26 - 34 pg    MCHC 32.8 31 - 37 g/dL    RDW 14.0 11.5 - 14.9 %    Platelets 412 760 - 345 k/uL    MPV 6.0 6.0 - 12.0 fL    NRBC Automated NOT REPORTED per 100 WBC    Differential Type NOT REPORTED     Seg Neutrophils 71 (H) 36 - 66 %    Lymphocytes 15 (L) 24 - 44 %    Monocytes 11 (H) 1 - 7 %    Eosinophils % 2 0 - 4 %    Basophils 1 0 - 2 %    Immature Granulocytes NOT REPORTED 0 %    Segs Absolute 6.20 1.3 - 9.1 k/uL    Absolute Lymph # 1.30 1.0 - 4.8 k/uL    Absolute Mono # 1.00 0.1 - 1.3 k/uL    Absolute Eos # 0.10 0.0 - 0.4 k/uL    Basophils Absolute 0.10 0.0 - 0.2 k/uL    Absolute Immature Granulocyte NOT REPORTED 0.00 - 0.30 k/uL    WBC Morphology NOT REPORTED     RBC Morphology NOT REPORTED     Platelet Estimate NOT REPORTED    Basic Metabolic Panel    Collection Time: 02/18/21  2:06 PM   Result Value Ref Range    Glucose 108 (H) 70 - 99 mg/dL    BUN 23 8 - 23 mg/dL    CREATININE 2.01 (H) 0.50 - 0.90 mg/dL    Bun/Cre Ratio NOT REPORTED 9 - 20    Calcium 9.5 8.6 - 10.4 mg/dL    Sodium 133 (L) 135 - 144 mmol/L    Potassium 4.9 3.7 - 5.3 mmol/L    Chloride 99 98 - 107 mmol/L    CO2 21 20 - 31 mmol/L    Anion Gap 13 9 - 17 mmol/L    GFR Non-African American 24 (L) >60 mL/min    GFR  29 (L) >60 mL/min    GFR Comment          GFR Staging NOT REPORTED    Lactic Acid, Plasma    Collection Time: 02/18/21  2:06 PM   Result Value Ref Range    Lactic Acid 1.0 0.5 - 2.2 mmol/L    Lactic Acid, Whole Blood NOT REPORTED 0.7 - 2.1 mmol/L       Assesment:     Primary Problem  Acute cystitis with hematuria    Principal Problem:    Acute cystitis with hematuria  Active Problems:    Hypertension    ASCVD (arteriosclerotic cardiovascular disease)    Other specified hypothyroidism    Gastroesophageal reflux disease without esophagitis    Vitamin B12 deficiency    Acute kidney injury superimposed on CKD (HCC)    Iron deficiency anemia    Stage 3 chronic kidney disease  Resolved Problems:    * No resolved hospital problems. *      Plan:     1. IV meropenem  2. Normal saline at 50 mL/h  3. Urine culture  4. Blood culture x2  5. Sulfate 325 mg p.o. twice daily  6. DVT prophylaxis Lovenox 30 mg subcu daily  7. TSH, T4, free T3  8. Monitor CMP  9. Consult ID  10.   EPCs  11.  check and replace electrolytes per sliding scale  12.  restart home medications        Electronically signed by Radha Madrid MD     Copy sent to Dr. Mi Yoon

## 2021-02-18 NOTE — ED NOTES
Admission Dx: UTI    Pts Chief Complaints on Arrival: UTI    ADL's - Self-care    Pending Diagnostics:  None    Residence PTA: single story home    Special Considerations/Circumstances:  Ambulates with cane per self.      Vitals: Current vital signs:  /72   Pulse 104   Temp 97 °F (36.1 °C) (Temporal)   Resp 16   Ht 4' 11\" (1.499 m)   Wt 135 lb (61.2 kg)   SpO2 99%   BMI 27.27 kg/m²                MEWS Score: 2            Dee Sofia RN  02/18/21 7612

## 2021-02-18 NOTE — ED TRIAGE NOTES
Pt presents in ED c/o burning with urination; pt stated that this is a reoccurring problem; pt reports a hx of bladder cancer; last chemo/radiation treatment was at [address] prior to the new year per pt; pt stated that she was diagnosed at [facility] with a UTI on 2/9/2021 but was not placed on ATBs due to her allergies; pt reports that she was to follow up with an infectious disease physician but due to the recent weather advisories her appointments were cancelled. Pts reports Dr. Danica Manriquez requested that the pt seek care in this ED. Pt ambulates with a cane but is independent; spouse present.

## 2021-02-18 NOTE — TELEPHONE ENCOUNTER
2/18/2021 9:26 AM Please call pt:  Caitlin Chowdary [phone]  80year old female   1935  she is calling again and is a bit frantic reg UTI and abx.  Thanks, Fiorella Read 2/18/2021 11:21 AM 2/18/2021 11:23 AM Can you ask her to go to Er if she have symptoms please 2/18/2021 11:30 AM I did call her and asked her to go to ER 2/18/2021 12:45 PM Thank you!!

## 2021-02-18 NOTE — PROGRESS NOTES
Medication History completed:    New medications: Norco    Medications discontinued: Luis Serrano    Changes to dosing:   Lisinopril changed to 5 mg daily  Pravastatin changed to 80 mg daily    Stated allergies: As listed    Other pertinent information: Medications confirmed with [address]      Thank you,  Reymundo Mead, PharmD, BCPS  [phone]

## 2021-02-19 VITALS
DIASTOLIC BLOOD PRESSURE: 56 MMHG | BODY MASS INDEX: 27.02 KG/M2 | TEMPERATURE: 98.2 F | HEART RATE: 84 BPM | SYSTOLIC BLOOD PRESSURE: 125 MMHG | WEIGHT: 134.04 LBS | RESPIRATION RATE: 18 BRPM | OXYGEN SATURATION: 100 % | HEIGHT: 59 IN

## 2021-02-19 DIAGNOSIS — N39.0 URINARY TRACT INFECTION, RECURRENT: ICD-10-CM

## 2021-02-19 LAB
ABSOLUTE EOS #: 0.2 K/UL (ref 0–0.4)
ABSOLUTE IMMATURE GRANULOCYTE: ABNORMAL K/UL (ref 0–0.3)
ABSOLUTE LYMPH #: 0.8 K/UL (ref 1–4.8)
ABSOLUTE MONO #: 0.7 K/UL (ref 0.1–1.3)
ALBUMIN SERPL-MCNC: 3.1 G/DL (ref 3.5–5.2)
ALBUMIN/GLOBULIN RATIO: ABNORMAL (ref 1–2.5)
ALP BLD-CCNC: 73 U/L (ref 35–104)
ALT SERPL-CCNC: <5 U/L (ref 5–33)
ANION GAP SERPL CALCULATED.3IONS-SCNC: 9 MMOL/L (ref 9–17)
AST SERPL-CCNC: 11 U/L
BASOPHILS # BLD: 1 % (ref 0–2)
BASOPHILS ABSOLUTE: 0.1 K/UL (ref 0–0.2)
BILIRUB SERPL-MCNC: <0.15 MG/DL (ref 0.3–1.2)
BUN BLDV-MCNC: 17 MG/DL (ref 8–23)
BUN/CREAT BLD: ABNORMAL (ref 9–20)
CALCIUM SERPL-MCNC: 8.5 MG/DL (ref 8.6–10.4)
CHLORIDE BLD-SCNC: 109 MMOL/L (ref 98–107)
CO2: 20 MMOL/L (ref 20–31)
CREAT SERPL-MCNC: 1.79 MG/DL (ref 0.5–0.9)
CULTURE: ABNORMAL
DIFFERENTIAL TYPE: ABNORMAL
EOSINOPHILS RELATIVE PERCENT: 3 % (ref 0–4)
GFR AFRICAN AMERICAN: 33 ML/MIN
GFR NON-AFRICAN AMERICAN: 27 ML/MIN
GFR SERPL CREATININE-BSD FRML MDRD: ABNORMAL ML/MIN/{1.73_M2}
GFR SERPL CREATININE-BSD FRML MDRD: ABNORMAL ML/MIN/{1.73_M2}
GLUCOSE BLD-MCNC: 89 MG/DL (ref 70–99)
HCT VFR BLD CALC: 25.6 % (ref 36–46)
HEMOGLOBIN: 8.4 G/DL (ref 12–16)
IMMATURE GRANULOCYTES: ABNORMAL %
LYMPHOCYTES # BLD: 13 % (ref 24–44)
Lab: ABNORMAL
MCH RBC QN AUTO: 31.7 PG (ref 26–34)
MCHC RBC AUTO-ENTMCNC: 32.9 G/DL (ref 31–37)
MCV RBC AUTO: 96.3 FL (ref 80–100)
MONOCYTES # BLD: 12 % (ref 1–7)
NRBC AUTOMATED: ABNORMAL PER 100 WBC
PDW BLD-RTO: 14 % (ref 11.5–14.9)
PLATELET # BLD: 333 K/UL (ref 150–450)
PLATELET ESTIMATE: ABNORMAL
PMV BLD AUTO: 6.3 FL (ref 6–12)
POTASSIUM SERPL-SCNC: 4.8 MMOL/L (ref 3.7–5.3)
RBC # BLD: 2.66 M/UL (ref 4–5.2)
RBC # BLD: ABNORMAL 10*6/UL
SEG NEUTROPHILS: 71 % (ref 36–66)
SEGMENTED NEUTROPHILS ABSOLUTE COUNT: 4.2 K/UL (ref 1.3–9.1)
SODIUM BLD-SCNC: 138 MMOL/L (ref 135–144)
SPECIMEN DESCRIPTION: ABNORMAL
T3 FREE: 1.87 PG/ML (ref 2.02–4.43)
TOTAL PROTEIN: 6 G/DL (ref 6.4–8.3)
TSH SERPL DL<=0.05 MIU/L-ACNC: 1.74 MIU/L (ref 0.3–5)
WBC # BLD: 5.9 K/UL (ref 3.5–11)
WBC # BLD: ABNORMAL 10*3/UL

## 2021-02-19 PROCEDURE — 80053 COMPREHEN METABOLIC PANEL: CPT

## 2021-02-19 PROCEDURE — 94761 N-INVAS EAR/PLS OXIMETRY MLT: CPT

## 2021-02-19 PROCEDURE — G0378 HOSPITAL OBSERVATION PER HR: HCPCS

## 2021-02-19 PROCEDURE — 6370000000 HC RX 637 (ALT 250 FOR IP): Performed by: FAMILY MEDICINE

## 2021-02-19 PROCEDURE — 2580000003 HC RX 258: Performed by: INTERNAL MEDICINE

## 2021-02-19 PROCEDURE — 96366 THER/PROPH/DIAG IV INF ADDON: CPT

## 2021-02-19 PROCEDURE — 2580000003 HC RX 258: Performed by: FAMILY MEDICINE

## 2021-02-19 PROCEDURE — 6360000002 HC RX W HCPCS: Performed by: FAMILY MEDICINE

## 2021-02-19 PROCEDURE — 6360000002 HC RX W HCPCS: Performed by: INTERNAL MEDICINE

## 2021-02-19 PROCEDURE — 87040 BLOOD CULTURE FOR BACTERIA: CPT

## 2021-02-19 PROCEDURE — 99222 1ST HOSP IP/OBS MODERATE 55: CPT | Performed by: INTERNAL MEDICINE

## 2021-02-19 PROCEDURE — 85025 COMPLETE CBC W/AUTO DIFF WBC: CPT

## 2021-02-19 PROCEDURE — 36415 COLL VENOUS BLD VENIPUNCTURE: CPT

## 2021-02-19 PROCEDURE — 96372 THER/PROPH/DIAG INJ SC/IM: CPT

## 2021-02-19 PROCEDURE — 84481 FREE ASSAY (FT-3): CPT

## 2021-02-19 PROCEDURE — 84443 ASSAY THYROID STIM HORMONE: CPT

## 2021-02-19 PROCEDURE — 94640 AIRWAY INHALATION TREATMENT: CPT

## 2021-02-19 RX ORDER — SODIUM CHLORIDE 0.9 % (FLUSH) 0.9 %
5 SYRINGE (ML) INJECTION PRN
Status: CANCELLED | OUTPATIENT
Start: 2021-02-19

## 2021-02-19 RX ORDER — EPINEPHRINE 1 MG/ML
0.3 INJECTION, SOLUTION, CONCENTRATE INTRAVENOUS PRN
Status: CANCELLED | OUTPATIENT
Start: 2021-02-19

## 2021-02-19 RX ORDER — HEPARIN SODIUM (PORCINE) LOCK FLUSH IV SOLN 100 UNIT/ML 100 UNIT/ML
500 SOLUTION INTRAVENOUS PRN
Status: CANCELLED | OUTPATIENT
Start: 2021-02-19

## 2021-02-19 RX ORDER — DIPHENHYDRAMINE HYDROCHLORIDE 50 MG/ML
50 INJECTION INTRAMUSCULAR; INTRAVENOUS ONCE
Status: CANCELLED | OUTPATIENT
Start: 2021-02-19 | End: 2021-02-19

## 2021-02-19 RX ORDER — HEPARIN SODIUM (PORCINE) LOCK FLUSH IV SOLN 100 UNIT/ML 100 UNIT/ML
300 SOLUTION INTRAVENOUS ONCE
Status: COMPLETED | OUTPATIENT
Start: 2021-02-19 | End: 2021-02-19

## 2021-02-19 RX ORDER — SODIUM CHLORIDE 0.9 % (FLUSH) 0.9 %
10 SYRINGE (ML) INJECTION PRN
Status: CANCELLED | OUTPATIENT
Start: 2021-02-19

## 2021-02-19 RX ORDER — SODIUM CHLORIDE 9 MG/ML
INJECTION, SOLUTION INTRAVENOUS CONTINUOUS
Status: CANCELLED | OUTPATIENT
Start: 2021-02-19

## 2021-02-19 RX ORDER — METHYLPREDNISOLONE SODIUM SUCCINATE 125 MG/2ML
125 INJECTION, POWDER, LYOPHILIZED, FOR SOLUTION INTRAMUSCULAR; INTRAVENOUS ONCE
Status: CANCELLED | OUTPATIENT
Start: 2021-02-19 | End: 2021-02-19

## 2021-02-19 RX ORDER — FERROUS SULFATE 325(65) MG
325 TABLET ORAL 2 TIMES DAILY WITH MEALS
Qty: 30 TABLET | Refills: 3 | Status: SHIPPED | OUTPATIENT
Start: 2021-02-19 | End: 2021-03-31 | Stop reason: DRUGHIGH

## 2021-02-19 RX ADMIN — HEPARIN 300 UNITS: 100 SYRINGE at 16:01

## 2021-02-19 RX ADMIN — IPRATROPIUM BROMIDE AND ALBUTEROL SULFATE 1 AMPULE: .5; 3 SOLUTION RESPIRATORY (INHALATION) at 11:21

## 2021-02-19 RX ADMIN — IPRATROPIUM BROMIDE AND ALBUTEROL SULFATE 1 AMPULE: .5; 3 SOLUTION RESPIRATORY (INHALATION) at 07:38

## 2021-02-19 RX ADMIN — FAMOTIDINE 10 MG: 20 TABLET ORAL at 08:47

## 2021-02-19 RX ADMIN — LISINOPRIL 10 MG: 10 TABLET ORAL at 08:47

## 2021-02-19 RX ADMIN — MEROPENEM 500 MG: 500 INJECTION, POWDER, FOR SOLUTION INTRAVENOUS at 05:00

## 2021-02-19 RX ADMIN — Medication 2 PUFF: at 08:48

## 2021-02-19 RX ADMIN — ASPIRIN 81 MG: 81 TABLET, CHEWABLE ORAL at 08:47

## 2021-02-19 RX ADMIN — SODIUM CHLORIDE 1000 MG: 900 INJECTION INTRAVENOUS at 15:01

## 2021-02-19 RX ADMIN — FERROUS SULFATE TAB 325 MG (65 MG ELEMENTAL FE) 325 MG: 325 (65 FE) TAB at 08:47

## 2021-02-19 RX ADMIN — ENOXAPARIN SODIUM 30 MG: 30 INJECTION, SOLUTION INTRAVENOUS; SUBCUTANEOUS at 08:48

## 2021-02-19 RX ADMIN — CLOPIDOGREL BISULFATE 75 MG: 75 TABLET ORAL at 08:47

## 2021-02-19 RX ADMIN — IPRATROPIUM BROMIDE AND ALBUTEROL SULFATE 1 AMPULE: .5; 3 SOLUTION RESPIRATORY (INHALATION) at 14:53

## 2021-02-19 RX ADMIN — CYANOCOBALAMIN TAB 1000 MCG 1000 MCG: 1000 TAB at 08:48

## 2021-02-19 RX ADMIN — LEVOTHYROXINE SODIUM 75 MCG: 75 TABLET ORAL at 05:00

## 2021-02-19 ASSESSMENT — ENCOUNTER SYMPTOMS
EYE DISCHARGE: 0
EYE PAIN: 0
CHEST TIGHTNESS: 0
RECTAL PAIN: 0
STRIDOR: 0
ANAL BLEEDING: 0
APNEA: 0
SORE THROAT: 0

## 2021-02-19 NOTE — CONSULTS
[facility] -   Infectious diseases evaluation  admission date 2/18/2021    reason for consultation:   UTI    Impression :   Current:  Recurrent UTI with  ESBL producing E. coli failed outpatient treatment  Urinary retention  T2 urothelial cell carcinoma status post  tumor resection 8/24/2020 initially followed by  5 cycles of fu /mitomycin and radiation that was completed. Status postop transurethral resection of residual bladder tumor 12/30/2020  History of DVT on anticoagulation  Coronary artery disease  Hypertension  Hyperlipidemia      Recommendations   · IV meropenem 1 g every 8 hours for 10 days could be done through her port  · Follow CBC and renal function in 1 week  · Follow cultures and adjust antibiotics as needed  · Follow-up with me next week      History of Present Illness:   Initial history:  Marquise Abad is a 80y.o.-year-old female presented to hospital with dysuria, moderate, constant for more than 4 weeks associated with urinary incontinence. Urine culture 1/12/2021 grew ESBL producing E. Coli  She was treated with Macrobid and fosfomycin as outpatient with no improvement was admitted for IV antibiotics. The patient had T2 urothelial cell carcinoma status post  tumor resection 8/24/2020 initially followed by  5 cycles of fu /mitomycin and radiation that was completed. Status postop transurethral resection of residual bladder tumor 12/30/2020   Urine culture 12/21/2020 grew  E. coli that was sensitive to ampicillin/ sulbactam and Klebsiella ([address] was detected)  She was treated with Unasyn during her hospitalization, discharged on Augmentin. History of left lower extremity DVT, coronary artery disease, hypertension, hyperlipidemia, UTIs with history of ESBL, chronic renal disease, hydronephrosis.   Prior bladder suspension surgery in 1970's    Interval changes  2/19/2021     Patient Vitals for the past 8 hrs:   BP Temp Temp src Pulse Resp SpO2 Weight   02/19/21 0741 -- -- -- 75 16 95 % --   02/19/21 0715 137/64 98.1 °F (36.7 °C) Oral 74 19 96 % --   02/19/21 0430 -- -- -- -- -- -- 134 lb 0.6 oz (60.8 kg)             I have personally reviewed the past medical history, past surgical history, medications, social history, and family history, and I haveupdated the database accordingly. Allergies:   Bactrim [sulfamethoxazole-trimethoprim], Gentamycin [gentamicin], and Sulfamethoxazole-trimethoprim     Review of Systems:     Review of Systems  As per history present illness, other than above 12 system review was negative  Physical Examination :       Physical Exam  Constitutional:       General: She is not in acute distress. Appearance: Normal appearance. HENT:      Head: Normocephalic and atraumatic. Right Ear: External ear normal.      Left Ear: External ear normal.   Eyes:      General: No scleral icterus. Conjunctiva/sclera: Conjunctivae normal.   Neck:      Musculoskeletal: Neck supple. No neck rigidity. Cardiovascular:      Rate and Rhythm: Normal rate and regular rhythm. Heart sounds: No murmur. Pulmonary:      Breath sounds: Normal breath sounds. No wheezing. Abdominal:      General: Abdomen is flat. There is no distension. Palpations: Abdomen is soft. Musculoskeletal:      Right lower leg: No edema. Left lower leg: No edema. Skin:     General: Skin is warm and dry. Coloration: Skin is not jaundiced. Neurological:      General: No focal deficit present. Mental Status: She is alert and oriented to person, place, and time. Psychiatric:         Mood and Affect: Mood normal.         Past Medical History:     Past Medical History:   Diagnosis Date    Coronary artery disease     ESBL (extended spectrum beta-lactamase) producing bacteria infection 6/30/2015    E.  Coli urine    HH (hiatus hernia)     Hyperkalemia 4/29/2020    club or organization: Not on file     Attends meetings of clubs or organizations: Not on file     Relationship status: Not on file    Intimate partner violence     Fear of current or ex partner: Not on file     Emotionally abused: Not on file     Physically abused: Not on file     Forced sexual activity: Not on file   Other Topics Concern    Not on file   Social History Narrative    Not on file       Family History:     Family History   Problem Relation Age of Onset    Diabetes Brother     Stroke Father     Coronary Art Dis Other         siblings X3      Medical Decision Making:   I have independently reviewed/ordered the following labs:    CBC with Differential:   Recent Labs     02/18/21  1406 02/19/21  0508   WBC 8.7 5.9   HGB 9.2* 8.4*   HCT 28.0* 25.6*    333   LYMPHOPCT 15* 13*   MONOPCT 11* 12*     BMP:  Recent Labs     02/18/21  1406 02/19/21  0508   * 138   K 4.9 4.8   CL 99 109*   CO2 21 20   BUN 23 17   CREATININE 2.01* 1.79*     Hepatic Function Panel:   Recent Labs     02/19/21  0508   PROT 6.0*   LABALBU 3.1*   BILITOT <0.15*   ALKPHOS 73   ALT <5*   AST 11     No results for input(s): RPR in the last 72 hours. No results for input(s): HIV in the last 72 hours. No results for input(s): BC in the last 72 hours. Lab Results   Component Value Date    CREATININE 1.79 02/19/2021    GLUCOSE 89 02/19/2021       Detailed results: Thank you for allowing us to participate in the care of this patient. Please call with questions. This note is created with the assistance of a speech recognition program.  While intending to generate adocument that actually reflects the content of the visit, the document can still have some errors including those of syntax and sound a like substitutions which may escape proof reading. It such instances, actual meaningcan be extrapolated by contextual diversion.     Laila Mejia MD  Office: [phone]  Perfect serve / office [phone]

## 2021-02-19 NOTE — DISCHARGE SUMMARY
Discharge Summary      Patient ID: Pallavi Fitzpatrick    MRN: [id number]     Acct:  [de-identified]       Patient's PCP: Bhavik Stark Date: 2/18/2021     Discharge Date:   2/19/2021      Admitting Physician: Dora Bajwa MD    Discharge Physician: Dora Bajwa MD     Discharge Diagnoses:    Primary Problem  Acute cystitis with hematuria    Principal Problem:    Acute cystitis with hematuria  Active Problems:    Hypertension    ASCVD (arteriosclerotic cardiovascular disease)    Other specified hypothyroidism    Gastroesophageal reflux disease without esophagitis    Vitamin B12 deficiency    Acute kidney injury superimposed on CKD ([address]    Iron deficiency anemia    Stage 3 chronic kidney disease  Resolved Problems:    * No resolved hospital problems. *    Past Medical History:   Diagnosis Date    Coronary artery disease     ESBL (extended spectrum beta-lactamase) producing bacteria infection 6/30/2015    E. Coli urine    HH (hiatus hernia)     Hyperkalemia 4/29/2020    Hyperlipidemia     Hypertension     Hypotension 4/29/2020    Thyroid disease      The patient was seen and examined on day of discharge and this discharge summary is in conjunction with daily progress note from day of discharge. Code Status:  Full Code    Hospital Course:   H&P Reviewed. Patient was admitted due to UTI, urine culture with multidrug resistance bacteria. IV invanz for 10 days per ID for UTI. Patient does have a central port in place. Patient will have IV antibiotic at [facility] infusion center. Discharge day progress note: Today   Patient was seen and examined at bedside today. Hemodynamically stable. No chest pain, shortness of breath, fever, chills, nausea, vomiting, palpitations, or abdominal pain reported. No events reported overnight. Review of Systems   Constitutional: Negative for appetite change and fatigue. HENT: Negative for congestion, postnasal drip and sore throat.     Eyes: Negative for pain and discharge. Respiratory: Negative for apnea, chest tightness and stridor. Cardiovascular: Negative for palpitations. Gastrointestinal: Negative for anal bleeding and rectal pain. Endocrine: Negative for polydipsia and polyphagia. Genitourinary: Negative for decreased urine volume, flank pain and hematuria. Musculoskeletal: Negative for joint swelling and neck stiffness. Skin: Negative for rash. Allergic/Immunologic: Negative for food allergies. Neurological: Negative for seizures, facial asymmetry and speech difficulty. Hematological: Does not bruise/bleed easily. Psychiatric/Behavioral: Negative for behavioral problems and suicidal ideas. The patient is not hyperactive. Physical Exam  Vitals signs reviewed. Constitutional:       Appearance: She is well-developed. HENT:      Head: Normocephalic and atraumatic. Nose: Nose normal.   Eyes:      General: Lids are normal.   Neck:      Trachea: No tracheal deviation. Cardiovascular:      Rate and Rhythm: Normal rate and regular rhythm. Heart sounds: Normal heart sounds, S1 normal and S2 normal.   Pulmonary:      Effort: Pulmonary effort is normal. No respiratory distress. Breath sounds: Normal breath sounds. Abdominal:      General: Bowel sounds are normal. There is no distension. Palpations: Abdomen is soft. Tenderness: There is no abdominal tenderness. There is no guarding. Musculoskeletal:         General: No tenderness or deformity. Lymphadenopathy:      Cervical: No cervical adenopathy. Upper Body:      Right upper body: No supraclavicular or epitrochlear adenopathy. Left upper body: No supraclavicular or epitrochlear adenopathy. Skin:     General: Skin is warm and dry. Capillary Refill: Capillary refill takes less than 2 seconds. Neurological:      Mental Status: She is alert. Motor: No abnormal muscle tone or seizure activity.    Psychiatric: Speech: Speech normal.         Behavior: Behavior normal.         Judgment: Judgment normal.         Consults:  IP CONSULT TO INTERNAL MEDICINE  IP CONSULT TO INFECTIOUS DISEASES    Significant Diagnostic Studies: as above, and as follows:    Treatments: as above    Disposition: home    Discharged Condition: Stable    Follow Up:   Sergio León in one week    Discharge Medications:    Giacomo Mares   Home Medication Instructions [id number]    Printed on:02/19/21 0313   Medication Information                      acetaminophen (TYLENOL) 500 MG tablet  Take 1,000 mg by mouth every 6 hours as needed for Pain             albuterol (PROVENTIL) (2.5 MG/3ML) 0.083% nebulizer solution  Take 3 mLs by nebulization every 6 hours as needed for Wheezing             aspirin 81 MG chewable tablet  Take 1 tablet by mouth daily             budesonide-formoterol (SYMBICORT) 80-4.5 MCG/ACT AERO  Inhale 2 puffs into the lungs 2 times daily             clopidogrel (PLAVIX) 75 MG tablet  Take 1 tablet by mouth daily             famotidine (PEPCID) 10 MG tablet  Take 20 mg by mouth 2 times daily              famotidine (PEPCID) 20 MG tablet  Take 20 mg by mouth 2 times daily             ferrous sulfate (IRON 325) 325 (65 Fe) MG tablet  Take 1 tablet by mouth 2 times daily (with meals)             HYDROcodone-acetaminophen (NORCO) 5-325 MG per tablet  Take 1 tablet by mouth 2 times daily as needed for Pain.             latanoprost (XALATAN) 0.005 % ophthalmic solution  Place 1 drop into both eyes nightly             levothyroxine (SYNTHROID) 75 MCG tablet  Take 75 mcg by mouth daily             lisinopril (PRINIVIL;ZESTRIL) 5 MG tablet  Take 5 mg by mouth daily              pravastatin (PRAVACHOL) 80 MG tablet  Take 80 mg by mouth daily             senna-docusate (PERICOLACE) 8.6-50 MG per tablet  Take 1 tablet by mouth daily as needed constipation             vitamin B-12 1000 MCG tablet  Take 1 tablet by mouth daily Time Spent on discharge is more than  35 min in the examination, evaluation, counseling and review of medications and discharge plan.       Electronically signed by Kenneth Riley MD     Copy sent to Dr. Jorge Andujar

## 2021-02-19 NOTE — CARE COORDINATION
ONGOING DISCHARGE PLAN:    Spoke with patient regarding discharge plan and patient confirms that plan is still to go home. She does not want a VNS . Received a verbal order from Dr Mya Dempsey for IV Invanz 1 g Q24hrs for 10 days. Patient will need her first dose given here, before discharging home. Patient has a chest port. Patient is requesting [facility] infusion [facility]. Faxed order to [phone]. Writer also called infusion center at [phone] and spoke with Hilda Mehta  And advised her here is a chance patient may not discharge today as we are waiting on cultures. Hilda Mehta advised to still fax orders as if patient was discharging today. Writer Fax orders. Hilda Mehta advised they will fax orders to [facility] today for weekend infusions.       Follow up appt made for 3/2/21 with Dr Adolfo Stewart     Probable discharge today or tomorrow    Electronically signed by Clare Swanson RN on 2/19/2021 at 11:31 AM

## 2021-02-19 NOTE — ED PROVIDER NOTES
HH (hiatus hernia)     Hyperkalemia 4/29/2020    Hyperlipidemia     Hypertension     Hypotension 4/29/2020    Thyroid disease          SURGICAL HISTORY      has a past surgical history that includes Total knee arthroplasty (Right); bladder suspension; angioplasty; and Cataract removal with implant (Bilateral). CURRENT MEDICATIONS       Current Discharge Medication List      CONTINUE these medications which have NOT CHANGED    Details   pravastatin (PRAVACHOL) 80 MG tablet Take 80 mg by mouth daily      HYDROcodone-acetaminophen (NORCO) 5-325 MG per tablet Take 1 tablet by mouth 2 times daily as needed for Pain. !! famotidine (PEPCID) 10 MG tablet Take 20 mg by mouth 2 times daily       !! famotidine (PEPCID) 20 MG tablet Take 20 mg by mouth 2 times daily      furosemide (LASIX) 20 MG tablet Take 20 mg by mouth daily      acetaminophen (TYLENOL) 500 MG tablet Take 1,000 mg by mouth every 6 hours as needed for Pain      albuterol (PROVENTIL) (2.5 MG/3ML) 0.083% nebulizer solution Take 3 mLs by nebulization every 6 hours as needed for Wheezing  Qty: 120 each, Refills: 3      budesonide-formoterol (SYMBICORT) 80-4.5 MCG/ACT AERO Inhale 2 puffs into the lungs 2 times daily  Qty: 1 Inhaler, Refills: 3      aspirin 81 MG chewable tablet Take 1 tablet by mouth daily  Qty: 30 tablet, Refills: 3      clopidogrel (PLAVIX) 75 MG tablet Take 1 tablet by mouth daily  Qty: 30 tablet, Refills: 3      lisinopril (PRINIVIL;ZESTRIL) 5 MG tablet Take 5 mg by mouth daily       latanoprost (XALATAN) 0.005 % ophthalmic solution Place 1 drop into both eyes nightly      senna-docusate (PERICOLACE) 8.6-50 MG per tablet Take 1 tablet by mouth daily as needed constipation      levothyroxine (SYNTHROID) 75 MCG tablet Take 75 mcg by mouth daily       ! ! - Potential duplicate medications found. Please discuss with provider.           ALLERGIES     is allergic to bactrim [sulfamethoxazole-trimethoprim]; gentamycin [gentamicin]; and sulfamethoxazole-trimethoprim. FAMILY HISTORY     She indicated that the status of her father is unknown. She indicated that the status of her brother is unknown. She indicated that the status of her other is unknown.     family history includes Coronary Art Dis in an other family member; Diabetes in her brother; Stroke in her father. SOCIAL HISTORY      reports that she has never smoked. She has never used smokeless tobacco. She reports that she does not drink alcohol or use drugs. PHYSICAL EXAM     INITIAL VITALS:  height is 4' 11\" (1.499 m) and weight is 135 lb (61.2 kg). Her oral temperature is 97.7 °F (36.5 °C). Her blood pressure is 143/66 (abnormal) and her pulse is 70. Her respiration is 16 and oxygen saturation is 100%. Physical Exam  Vitals signs and nursing note reviewed. Constitutional:       General: She is not in acute distress. HENT:      Head: Normocephalic and atraumatic. Eyes:      Conjunctiva/sclera: Conjunctivae normal.      Pupils: Pupils are equal, round, and reactive to light. Neck:      Musculoskeletal: Neck supple. Cardiovascular:      Rate and Rhythm: Normal rate and regular rhythm. Heart sounds: Normal heart sounds. No murmur. Pulmonary:      Effort: Pulmonary effort is normal. No respiratory distress. Breath sounds: Normal breath sounds. Abdominal:      General: Bowel sounds are normal. There is no distension. Palpations: Abdomen is soft. Tenderness: There is no abdominal tenderness. Musculoskeletal:         General: No tenderness. Lymphadenopathy:      Cervical: No cervical adenopathy. Skin:     General: Skin is warm and dry. Findings: No rash. Neurological:      Mental Status: She is alert and oriented to person, place, and time. Psychiatric:         Judgment: Judgment normal.           DIFFERENTIAL DIAGNOSIS/MDM:   77-year-old female sent in for admission for IV antibiotics for complicated urinary tract infection.   She is afebrile, nontoxic, normal vital signs. No acute distress. I spoke with Dr. Sara Hussein who accepted patient for admission. We will get blood work, she is recommending deferring antibiotics to infectious disease.     DIAGNOSTIC RESULTS     EKG: All EKG's are interpreted by the Emergency Department Physician who either signs or Co-signs this chart in the absence of a cardiologist.        RADIOLOGY:   I directly visualized the following  images and reviewed the radiologist interpretations:  No orders to display           ED BEDSIDE ULTRASOUND:      LABS:  Labs Reviewed   URINE RT REFLEX TO CULTURE - Abnormal; Notable for the following components:       Result Value    Turbidity UA CLOUDY (*)     Urine Hgb MOD (*)     Protein, UA 1+ (*)     Leukocyte Esterase, Urine LARGE (*)     All other components within normal limits   MICROSCOPIC URINALYSIS - Abnormal; Notable for the following components:    Bacteria, UA MODERATE (*)     All other components within normal limits   CBC WITH AUTO DIFFERENTIAL - Abnormal; Notable for the following components:    RBC 2.92 (*)     Hemoglobin 9.2 (*)     Hematocrit 28.0 (*)     Seg Neutrophils 71 (*)     Lymphocytes 15 (*)     Monocytes 11 (*)     All other components within normal limits   BASIC METABOLIC PANEL - Abnormal; Notable for the following components:    Glucose 108 (*)     CREATININE 2.01 (*)     Sodium 133 (*)     GFR Non- 24 (*)     GFR  29 (*)     All other components within normal limits   CULTURE, URINE   CULTURE, URINE   CULTURE, BLOOD 1   CULTURE, BLOOD 1   LACTIC ACID, PLASMA   COMPREHENSIVE METABOLIC PANEL W/ REFLEX TO MG FOR LOW K   CBC WITH AUTO DIFFERENTIAL   TSH WITH REFLEX   T3, FREE         EMERGENCY DEPARTMENT COURSE:   Vitals:    Vitals:    02/18/21 1233 02/18/21 1457   BP: 125/72 (!) 143/66   Pulse: 104 70   Resp: 16 16   Temp: 97 °F (36.1 °C) 97.7 °F (36.5 °C)   TempSrc: Temporal Oral   SpO2: 99% 100%   Weight: 135 lb (61.2 kg)    Height: 4' 11\" (1.499 m)               CRITICALCARE:      CONSULTS:  IP CONSULT TO INTERNAL MEDICINE  IP CONSULT TO INFECTIOUS DISEASES      PROCEDURES:      FINAL IMPRESSION      1. Urinary tract infection in female            DISPOSITION/PLAN   DISPOSITION Admitted 02/18/2021 01:53:04 PM          PATIENT REFERRED TO:  No follow-up provider specified.     DISCHARGE MEDICATIONS:  Current Discharge Medication List          (Please note that portions ofthis note were completed with a voice recognition program.  Efforts were made to edit the dictations but occasionally words are mis-transcribed.)    Felice Martini DO  Attending Emergency Physician          Felice Martini DO  02/18/21 6583

## 2021-02-19 NOTE — PLAN OF CARE
Problem: Falls - Risk of:  Goal: Will remain free from falls  Description: Will remain free from falls  2/19/2021 0738 by Jose Escobedo RN  Outcome: Met This Shift     Problem: Falls - Risk of:  Goal: Absence of physical injury  Description: Absence of physical injury  2/19/2021 0738 by Jose Escobedo RN  Outcome: Met This Shift     Problem: Sensory:  Goal: General experience of comfort will improve  Description: General experience of comfort will improve  Outcome: Ongoing     Problem: Urinary Elimination:  Goal: Signs and symptoms of infection will decrease  Description: Signs and symptoms of infection will decrease  Outcome: Ongoing  Goal: Ability to reestablish a normal urinary elimination pattern will improve - after catheter removal  Description: Ability to reestablish a normal urinary elimination pattern will improve  Outcome: Ongoing  Goal: Complications related to the disease process, condition or treatment will be avoided or minimized  Description: Complications related to the disease process, condition or treatment will be avoided or minimized  Outcome: Ongoing

## 2021-02-19 NOTE — PROGRESS NOTES
Physical Therapy    DATE: 2021    NAME: Pj Stanley  MRN: [id number]   : 1935      Patient not seen this date for Physical Therapy due to:    2021 at 1430- Pt being D/C home today per nurse Xiao Stewart.   Nurse Xiao Stewart deferred PT evaluation      Electronically signed by Kaylen Donnelly PT on 2021 at 2:37 PM

## 2021-02-20 ENCOUNTER — HOSPITAL ENCOUNTER (OUTPATIENT)
Age: 86
Discharge: HOME | End: 2021-02-20
Attending: INTERNAL MEDICINE | Admitting: INTERNAL MEDICINE
Payer: MEDICARE

## 2021-02-20 VITALS
HEART RATE: 83 BPM | RESPIRATION RATE: 16 BRPM | DIASTOLIC BLOOD PRESSURE: 71 MMHG | SYSTOLIC BLOOD PRESSURE: 132 MMHG | OXYGEN SATURATION: 98 % | TEMPERATURE: 97.7 F

## 2021-02-20 LAB
CULTURE: NO GROWTH
Lab: NORMAL
SPECIMEN DESCRIPTION: NORMAL

## 2021-02-20 PROCEDURE — 6360000002 HC RX W HCPCS: Performed by: INTERNAL MEDICINE

## 2021-02-20 PROCEDURE — 2580000003 HC RX 258: Performed by: INTERNAL MEDICINE

## 2021-02-20 PROCEDURE — 96365 THER/PROPH/DIAG IV INF INIT: CPT

## 2021-02-20 RX ORDER — SODIUM CHLORIDE 0.9 % (FLUSH) 0.9 %
10 SYRINGE (ML) INJECTION 2 TIMES DAILY
Status: DISCONTINUED | OUTPATIENT
Start: 2021-02-20 | End: 2021-02-20 | Stop reason: HOSPADM

## 2021-02-20 RX ORDER — HEPARIN SODIUM (PORCINE) LOCK FLUSH IV SOLN 100 UNIT/ML 100 UNIT/ML
300 SOLUTION INTRAVENOUS PRN
Status: DISCONTINUED | OUTPATIENT
Start: 2021-02-20 | End: 2021-02-20 | Stop reason: HOSPADM

## 2021-02-20 RX ADMIN — SODIUM CHLORIDE, PRESERVATIVE FREE 10 ML: 5 INJECTION INTRAVENOUS at 10:33

## 2021-02-20 RX ADMIN — HEPARIN 300 UNITS: 100 SYRINGE at 11:11

## 2021-02-20 RX ADMIN — SODIUM CHLORIDE 1000 MG: 900 INJECTION INTRAVENOUS at 10:32

## 2021-02-20 NOTE — PROGRESS NOTES
Port flushed with 10 ml normal saline, then heparinized. Needle de-accessed. Patient tolerated infusion well.

## 2021-02-20 NOTE — PROGRESS NOTES
Patient arrived to unit for IV Invanz Infusion. Vital signs are stable, orders placed, and call placed for clarification regarding using chest port.

## 2021-02-20 NOTE — PROGRESS NOTES
Chest port accessed with 0.75 inch needle. Brisk blood return noted, site flushed, and IV fluids infusing at [facility], awaiting antibiotic from pharmacy.

## 2021-02-21 ENCOUNTER — HOSPITAL ENCOUNTER (OUTPATIENT)
Age: 86
Discharge: HOME | End: 2021-02-21
Attending: INTERNAL MEDICINE | Admitting: INTERNAL MEDICINE
Payer: MEDICARE

## 2021-02-21 VITALS
SYSTOLIC BLOOD PRESSURE: 127 MMHG | RESPIRATION RATE: 16 BRPM | TEMPERATURE: 97.3 F | HEART RATE: 81 BPM | OXYGEN SATURATION: 100 % | DIASTOLIC BLOOD PRESSURE: 61 MMHG

## 2021-02-21 PROCEDURE — 96365 THER/PROPH/DIAG IV INF INIT: CPT

## 2021-02-21 PROCEDURE — 6360000002 HC RX W HCPCS: Performed by: INTERNAL MEDICINE

## 2021-02-21 PROCEDURE — 2580000003 HC RX 258: Performed by: INTERNAL MEDICINE

## 2021-02-21 RX ORDER — HEPARIN SODIUM (PORCINE) LOCK FLUSH IV SOLN 100 UNIT/ML 100 UNIT/ML
300 SOLUTION INTRAVENOUS PRN
Status: DISCONTINUED | OUTPATIENT
Start: 2021-02-21 | End: 2021-02-21 | Stop reason: HOSPADM

## 2021-02-21 RX ADMIN — HEPARIN 300 UNITS: 100 SYRINGE at 10:25

## 2021-02-21 RX ADMIN — SODIUM CHLORIDE 1000 MG: 900 INJECTION INTRAVENOUS at 09:50

## 2021-02-21 NOTE — PROGRESS NOTES
Pt arrived for Infusion. VS within normal limits, Chest port accessed, blood return and flushed. IV medication infusing.   at side

## 2021-02-22 ENCOUNTER — HOSPITAL ENCOUNTER (OUTPATIENT)
Dept: INFUSION THERAPY | Age: 86
Setting detail: INFUSION SERIES
Discharge: HOME | End: 2021-02-22
Payer: MEDICARE

## 2021-02-22 VITALS
OXYGEN SATURATION: 99 % | RESPIRATION RATE: 16 BRPM | SYSTOLIC BLOOD PRESSURE: 127 MMHG | HEART RATE: 81 BPM | DIASTOLIC BLOOD PRESSURE: 65 MMHG | TEMPERATURE: 97 F

## 2021-02-22 DIAGNOSIS — N39.0 URINARY TRACT INFECTION, RECURRENT: Primary | ICD-10-CM

## 2021-02-22 PROCEDURE — 2580000003 HC RX 258: Performed by: INTERNAL MEDICINE

## 2021-02-22 PROCEDURE — 6360000002 HC RX W HCPCS: Performed by: INTERNAL MEDICINE

## 2021-02-22 PROCEDURE — 96365 THER/PROPH/DIAG IV INF INIT: CPT

## 2021-02-22 RX ORDER — DIPHENHYDRAMINE HYDROCHLORIDE 50 MG/ML
50 INJECTION INTRAMUSCULAR; INTRAVENOUS ONCE
Status: CANCELLED | OUTPATIENT
Start: 2021-02-23 | End: 2021-02-23

## 2021-02-22 RX ORDER — EPINEPHRINE 1 MG/ML
0.3 INJECTION, SOLUTION, CONCENTRATE INTRAVENOUS PRN
Status: CANCELLED | OUTPATIENT
Start: 2021-02-23

## 2021-02-22 RX ORDER — HEPARIN SODIUM (PORCINE) LOCK FLUSH IV SOLN 100 UNIT/ML 100 UNIT/ML
500 SOLUTION INTRAVENOUS PRN
Status: CANCELLED | OUTPATIENT
Start: 2021-02-23

## 2021-02-22 RX ORDER — SODIUM CHLORIDE 0.9 % (FLUSH) 0.9 %
5 SYRINGE (ML) INJECTION PRN
Status: CANCELLED | OUTPATIENT
Start: 2021-02-23

## 2021-02-22 RX ORDER — SODIUM CHLORIDE 0.9 % (FLUSH) 0.9 %
10 SYRINGE (ML) INJECTION PRN
Status: CANCELLED | OUTPATIENT
Start: 2021-02-23

## 2021-02-22 RX ORDER — SODIUM CHLORIDE 9 MG/ML
INJECTION, SOLUTION INTRAVENOUS CONTINUOUS
Status: CANCELLED | OUTPATIENT
Start: 2021-02-23

## 2021-02-22 RX ORDER — HEPARIN SODIUM (PORCINE) LOCK FLUSH IV SOLN 100 UNIT/ML 100 UNIT/ML
500 SOLUTION INTRAVENOUS PRN
Status: DISCONTINUED | OUTPATIENT
Start: 2021-02-22 | End: 2021-02-23 | Stop reason: HOSPADM

## 2021-02-22 RX ORDER — METHYLPREDNISOLONE SODIUM SUCCINATE 125 MG/2ML
125 INJECTION, POWDER, LYOPHILIZED, FOR SOLUTION INTRAMUSCULAR; INTRAVENOUS ONCE
Status: CANCELLED | OUTPATIENT
Start: 2021-02-23 | End: 2021-02-23

## 2021-02-22 RX ADMIN — HEPARIN 500 UNITS: 100 SYRINGE at 10:02

## 2021-02-22 RX ADMIN — SODIUM CHLORIDE 1000 MG: 900 INJECTION INTRAVENOUS at 09:23

## 2021-02-22 NOTE — PROGRESS NOTES
Pt arrived for Invanz infusion. Vitals obtained and R chest port accessed without complications. Infusion started at 09:23 and ended at 10:02. Pt tolerated well. No s/s adverse reaction. Port heparin locked and deaccessed. Pt discharged home with  via wheelchair.

## 2021-02-23 ENCOUNTER — HOSPITAL ENCOUNTER (OUTPATIENT)
Dept: INFUSION THERAPY | Age: 86
Setting detail: INFUSION SERIES
Discharge: HOME | End: 2021-02-23
Payer: MEDICARE

## 2021-02-23 VITALS
DIASTOLIC BLOOD PRESSURE: 67 MMHG | HEART RATE: 72 BPM | RESPIRATION RATE: 18 BRPM | SYSTOLIC BLOOD PRESSURE: 141 MMHG | TEMPERATURE: 96.7 F | OXYGEN SATURATION: 100 %

## 2021-02-23 DIAGNOSIS — N39.0 URINARY TRACT INFECTION, RECURRENT: Primary | ICD-10-CM

## 2021-02-23 PROCEDURE — 6360000002 HC RX W HCPCS: Performed by: INTERNAL MEDICINE

## 2021-02-23 PROCEDURE — 2580000003 HC RX 258: Performed by: INTERNAL MEDICINE

## 2021-02-23 PROCEDURE — 96365 THER/PROPH/DIAG IV INF INIT: CPT

## 2021-02-23 RX ORDER — EPINEPHRINE 1 MG/ML
0.3 INJECTION, SOLUTION, CONCENTRATE INTRAVENOUS PRN
Status: CANCELLED | OUTPATIENT
Start: 2021-02-24

## 2021-02-23 RX ORDER — SODIUM CHLORIDE 0.9 % (FLUSH) 0.9 %
5 SYRINGE (ML) INJECTION PRN
Status: CANCELLED | OUTPATIENT
Start: 2021-02-24

## 2021-02-23 RX ORDER — HEPARIN SODIUM (PORCINE) LOCK FLUSH IV SOLN 100 UNIT/ML 100 UNIT/ML
500 SOLUTION INTRAVENOUS PRN
Status: DISCONTINUED | OUTPATIENT
Start: 2021-02-23 | End: 2021-02-24 | Stop reason: HOSPADM

## 2021-02-23 RX ORDER — DIPHENHYDRAMINE HYDROCHLORIDE 50 MG/ML
50 INJECTION INTRAMUSCULAR; INTRAVENOUS ONCE
Status: CANCELLED | OUTPATIENT
Start: 2021-02-24 | End: 2021-02-24

## 2021-02-23 RX ORDER — METHYLPREDNISOLONE SODIUM SUCCINATE 125 MG/2ML
125 INJECTION, POWDER, LYOPHILIZED, FOR SOLUTION INTRAMUSCULAR; INTRAVENOUS ONCE
Status: CANCELLED | OUTPATIENT
Start: 2021-02-24 | End: 2021-02-24

## 2021-02-23 RX ORDER — HEPARIN SODIUM (PORCINE) LOCK FLUSH IV SOLN 100 UNIT/ML 100 UNIT/ML
500 SOLUTION INTRAVENOUS PRN
Status: CANCELLED | OUTPATIENT
Start: 2021-02-24

## 2021-02-23 RX ORDER — SODIUM CHLORIDE 0.9 % (FLUSH) 0.9 %
10 SYRINGE (ML) INJECTION PRN
Status: CANCELLED | OUTPATIENT
Start: 2021-02-24

## 2021-02-23 RX ORDER — SODIUM CHLORIDE 9 MG/ML
INJECTION, SOLUTION INTRAVENOUS CONTINUOUS
Status: CANCELLED | OUTPATIENT
Start: 2021-02-24

## 2021-02-23 RX ADMIN — HEPARIN 500 UNITS: 100 SYRINGE at 11:26

## 2021-02-23 RX ADMIN — SODIUM CHLORIDE 1000 MG: 900 INJECTION INTRAVENOUS at 10:53

## 2021-02-23 NOTE — PROGRESS NOTES
Pt seen this date for invanz infusion. VS obtained and port accessed Infusion began at 1053 and ended at 1125. Pt tolerated infusion well and discharged home.

## 2021-02-24 ENCOUNTER — HOSPITAL ENCOUNTER (OUTPATIENT)
Dept: INFUSION THERAPY | Age: 86
Setting detail: INFUSION SERIES
Discharge: HOME | End: 2021-02-24
Payer: MEDICARE

## 2021-02-24 VITALS
SYSTOLIC BLOOD PRESSURE: 119 MMHG | TEMPERATURE: 96.8 F | HEART RATE: 74 BPM | OXYGEN SATURATION: 100 % | RESPIRATION RATE: 18 BRPM | DIASTOLIC BLOOD PRESSURE: 63 MMHG

## 2021-02-24 DIAGNOSIS — N39.0 URINARY TRACT INFECTION, RECURRENT: Primary | ICD-10-CM

## 2021-02-24 LAB
CULTURE: NORMAL
Lab: NORMAL
SPECIMEN DESCRIPTION: NORMAL

## 2021-02-24 PROCEDURE — 96365 THER/PROPH/DIAG IV INF INIT: CPT

## 2021-02-24 PROCEDURE — 6360000002 HC RX W HCPCS: Performed by: INTERNAL MEDICINE

## 2021-02-24 PROCEDURE — 2580000003 HC RX 258: Performed by: INTERNAL MEDICINE

## 2021-02-24 RX ORDER — HEPARIN SODIUM (PORCINE) LOCK FLUSH IV SOLN 100 UNIT/ML 100 UNIT/ML
500 SOLUTION INTRAVENOUS PRN
Status: CANCELLED | OUTPATIENT
Start: 2021-02-25

## 2021-02-24 RX ORDER — SODIUM CHLORIDE 0.9 % (FLUSH) 0.9 %
5 SYRINGE (ML) INJECTION PRN
Status: CANCELLED | OUTPATIENT
Start: 2021-02-25

## 2021-02-24 RX ORDER — DIPHENHYDRAMINE HYDROCHLORIDE 50 MG/ML
50 INJECTION INTRAMUSCULAR; INTRAVENOUS ONCE
Status: CANCELLED | OUTPATIENT
Start: 2021-02-25 | End: 2021-02-25

## 2021-02-24 RX ORDER — METHYLPREDNISOLONE SODIUM SUCCINATE 125 MG/2ML
125 INJECTION, POWDER, LYOPHILIZED, FOR SOLUTION INTRAMUSCULAR; INTRAVENOUS ONCE
Status: CANCELLED | OUTPATIENT
Start: 2021-02-25 | End: 2021-02-25

## 2021-02-24 RX ORDER — HEPARIN SODIUM (PORCINE) LOCK FLUSH IV SOLN 100 UNIT/ML 100 UNIT/ML
500 SOLUTION INTRAVENOUS PRN
Status: DISCONTINUED | OUTPATIENT
Start: 2021-02-24 | End: 2021-02-25 | Stop reason: HOSPADM

## 2021-02-24 RX ORDER — SODIUM CHLORIDE 9 MG/ML
INJECTION, SOLUTION INTRAVENOUS CONTINUOUS
Status: CANCELLED | OUTPATIENT
Start: 2021-02-25

## 2021-02-24 RX ORDER — SODIUM CHLORIDE 0.9 % (FLUSH) 0.9 %
10 SYRINGE (ML) INJECTION PRN
Status: CANCELLED | OUTPATIENT
Start: 2021-02-25

## 2021-02-24 RX ORDER — EPINEPHRINE 1 MG/ML
0.3 INJECTION, SOLUTION, CONCENTRATE INTRAVENOUS PRN
Status: CANCELLED | OUTPATIENT
Start: 2021-02-25

## 2021-02-24 RX ADMIN — HEPARIN 500 UNITS: 100 SYRINGE at 10:26

## 2021-02-24 RX ADMIN — SODIUM CHLORIDE 1000 MG: 900 INJECTION INTRAVENOUS at 09:53

## 2021-02-24 NOTE — PROGRESS NOTES
Pt arrived for Invanz infusion. Vitals obtained and R chest port accessed without complications. Infusion started at 09:53 and ended at 10:24. Pt tolerated well. No s/s adverse reaction noted. Port heparin locked and deaccessed. Pt discharged home via wheelchair with .

## 2021-02-25 ENCOUNTER — HOSPITAL ENCOUNTER (OUTPATIENT)
Dept: INFUSION THERAPY | Age: 86
Setting detail: INFUSION SERIES
Discharge: HOME | End: 2021-02-25
Payer: MEDICARE

## 2021-02-25 VITALS
RESPIRATION RATE: 18 BRPM | SYSTOLIC BLOOD PRESSURE: 118 MMHG | DIASTOLIC BLOOD PRESSURE: 52 MMHG | TEMPERATURE: 96.9 F | HEART RATE: 73 BPM | OXYGEN SATURATION: 100 %

## 2021-02-25 DIAGNOSIS — N39.0 URINARY TRACT INFECTION, RECURRENT: Primary | ICD-10-CM

## 2021-02-25 LAB
CULTURE: NORMAL
Lab: NORMAL
SPECIMEN DESCRIPTION: NORMAL

## 2021-02-25 PROCEDURE — 6360000002 HC RX W HCPCS: Performed by: INTERNAL MEDICINE

## 2021-02-25 PROCEDURE — 96365 THER/PROPH/DIAG IV INF INIT: CPT

## 2021-02-25 PROCEDURE — 2580000003 HC RX 258: Performed by: INTERNAL MEDICINE

## 2021-02-25 RX ORDER — SODIUM CHLORIDE 0.9 % (FLUSH) 0.9 %
5 SYRINGE (ML) INJECTION PRN
Status: CANCELLED | OUTPATIENT
Start: 2021-02-26

## 2021-02-25 RX ORDER — METHYLPREDNISOLONE SODIUM SUCCINATE 125 MG/2ML
125 INJECTION, POWDER, LYOPHILIZED, FOR SOLUTION INTRAMUSCULAR; INTRAVENOUS ONCE
Status: CANCELLED | OUTPATIENT
Start: 2021-02-26 | End: 2021-02-26

## 2021-02-25 RX ORDER — SODIUM CHLORIDE 9 MG/ML
INJECTION, SOLUTION INTRAVENOUS CONTINUOUS
Status: CANCELLED | OUTPATIENT
Start: 2021-02-26

## 2021-02-25 RX ORDER — HEPARIN SODIUM (PORCINE) LOCK FLUSH IV SOLN 100 UNIT/ML 100 UNIT/ML
500 SOLUTION INTRAVENOUS PRN
Status: DISCONTINUED | OUTPATIENT
Start: 2021-02-25 | End: 2021-02-26 | Stop reason: HOSPADM

## 2021-02-25 RX ORDER — DIPHENHYDRAMINE HYDROCHLORIDE 50 MG/ML
50 INJECTION INTRAMUSCULAR; INTRAVENOUS ONCE
Status: CANCELLED | OUTPATIENT
Start: 2021-02-26 | End: 2021-02-26

## 2021-02-25 RX ORDER — EPINEPHRINE 1 MG/ML
0.3 INJECTION, SOLUTION, CONCENTRATE INTRAVENOUS PRN
Status: CANCELLED | OUTPATIENT
Start: 2021-02-26

## 2021-02-25 RX ORDER — SODIUM CHLORIDE 0.9 % (FLUSH) 0.9 %
10 SYRINGE (ML) INJECTION PRN
Status: CANCELLED | OUTPATIENT
Start: 2021-02-26

## 2021-02-25 RX ORDER — HEPARIN SODIUM (PORCINE) LOCK FLUSH IV SOLN 100 UNIT/ML 100 UNIT/ML
500 SOLUTION INTRAVENOUS PRN
Status: CANCELLED | OUTPATIENT
Start: 2021-02-26

## 2021-02-25 RX ADMIN — SODIUM CHLORIDE 1000 MG: 900 INJECTION INTRAVENOUS at 09:24

## 2021-02-25 RX ADMIN — HEPARIN 500 UNITS: 100 SYRINGE at 10:00

## 2021-02-25 NOTE — PROGRESS NOTES
Pt arrived for Invanz infusion. Vitals obtained and R chest port accessed without complications. Infusion started at 09:24 and ended at 09:59. Pt tolerated well. No s/s adverse reaction noted. Port heparin locked and deaccessed. Pt discharged home with  via wheelchair.

## 2021-02-26 ENCOUNTER — HOSPITAL ENCOUNTER (OUTPATIENT)
Dept: INFUSION THERAPY | Age: 86
Setting detail: INFUSION SERIES
Discharge: HOME | End: 2021-02-26
Payer: MEDICARE

## 2021-02-26 VITALS
RESPIRATION RATE: 16 BRPM | HEART RATE: 79 BPM | TEMPERATURE: 96.5 F | OXYGEN SATURATION: 100 % | DIASTOLIC BLOOD PRESSURE: 52 MMHG | SYSTOLIC BLOOD PRESSURE: 110 MMHG

## 2021-02-26 DIAGNOSIS — N39.0 URINARY TRACT INFECTION, RECURRENT: Primary | ICD-10-CM

## 2021-02-26 PROCEDURE — 6360000002 HC RX W HCPCS: Performed by: INTERNAL MEDICINE

## 2021-02-26 PROCEDURE — 96365 THER/PROPH/DIAG IV INF INIT: CPT

## 2021-02-26 PROCEDURE — 2580000003 HC RX 258: Performed by: INTERNAL MEDICINE

## 2021-02-26 RX ORDER — HEPARIN SODIUM (PORCINE) LOCK FLUSH IV SOLN 100 UNIT/ML 100 UNIT/ML
500 SOLUTION INTRAVENOUS PRN
Status: CANCELLED | OUTPATIENT
Start: 2021-02-27

## 2021-02-26 RX ORDER — HEPARIN SODIUM (PORCINE) LOCK FLUSH IV SOLN 100 UNIT/ML 100 UNIT/ML
500 SOLUTION INTRAVENOUS PRN
Status: DISCONTINUED | OUTPATIENT
Start: 2021-02-26 | End: 2021-02-27 | Stop reason: HOSPADM

## 2021-02-26 RX ORDER — DIPHENHYDRAMINE HYDROCHLORIDE 50 MG/ML
50 INJECTION INTRAMUSCULAR; INTRAVENOUS ONCE
Status: CANCELLED | OUTPATIENT
Start: 2021-02-27 | End: 2021-02-27

## 2021-02-26 RX ORDER — SODIUM CHLORIDE 0.9 % (FLUSH) 0.9 %
10 SYRINGE (ML) INJECTION PRN
Status: CANCELLED | OUTPATIENT
Start: 2021-02-27

## 2021-02-26 RX ORDER — METHYLPREDNISOLONE SODIUM SUCCINATE 125 MG/2ML
125 INJECTION, POWDER, LYOPHILIZED, FOR SOLUTION INTRAMUSCULAR; INTRAVENOUS ONCE
Status: CANCELLED | OUTPATIENT
Start: 2021-02-27 | End: 2021-02-27

## 2021-02-26 RX ORDER — SODIUM CHLORIDE 0.9 % (FLUSH) 0.9 %
5 SYRINGE (ML) INJECTION PRN
Status: CANCELLED | OUTPATIENT
Start: 2021-02-27

## 2021-02-26 RX ORDER — EPINEPHRINE 1 MG/ML
0.3 INJECTION, SOLUTION, CONCENTRATE INTRAVENOUS PRN
Status: CANCELLED | OUTPATIENT
Start: 2021-02-27

## 2021-02-26 RX ORDER — SODIUM CHLORIDE 9 MG/ML
INJECTION, SOLUTION INTRAVENOUS CONTINUOUS
Status: CANCELLED | OUTPATIENT
Start: 2021-02-27

## 2021-02-26 RX ADMIN — HEPARIN 500 UNITS: 100 SYRINGE at 10:02

## 2021-02-26 RX ADMIN — SODIUM CHLORIDE 1000 MG: 900 INJECTION INTRAVENOUS at 09:25

## 2021-02-26 NOTE — PROGRESS NOTES
Pt seen this date for invanz infusion. VS obtained and port accessed. Infusion began at 925 and ended at 1001. Pt tolerated infusion well and discharged home.

## 2021-02-27 ENCOUNTER — HOSPITAL ENCOUNTER (OUTPATIENT)
Age: 86
Discharge: HOME | End: 2021-02-27
Attending: INTERNAL MEDICINE | Admitting: INTERNAL MEDICINE
Payer: MEDICARE

## 2021-02-27 VITALS
DIASTOLIC BLOOD PRESSURE: 63 MMHG | TEMPERATURE: 97.4 F | RESPIRATION RATE: 16 BRPM | SYSTOLIC BLOOD PRESSURE: 116 MMHG | OXYGEN SATURATION: 100 % | HEART RATE: 84 BPM

## 2021-02-27 PROCEDURE — 96365 THER/PROPH/DIAG IV INF INIT: CPT

## 2021-02-27 PROCEDURE — 6360000002 HC RX W HCPCS: Performed by: INTERNAL MEDICINE

## 2021-02-27 PROCEDURE — 2580000003 HC RX 258: Performed by: INTERNAL MEDICINE

## 2021-02-27 RX ORDER — HEPARIN SODIUM (PORCINE) LOCK FLUSH IV SOLN 100 UNIT/ML 100 UNIT/ML
300 SOLUTION INTRAVENOUS PRN
Status: DISCONTINUED | OUTPATIENT
Start: 2021-02-27 | End: 2021-02-27 | Stop reason: HOSPADM

## 2021-02-27 RX ADMIN — SODIUM CHLORIDE 1000 MG: 900 INJECTION INTRAVENOUS at 09:59

## 2021-02-27 RX ADMIN — HEPARIN 300 UNITS: 100 SYRINGE at 09:59

## 2021-02-27 NOTE — FLOWSHEET NOTE
Pt arrived via wheelchair with spouse to room 2055. Right chest port accessed with 0.75 mm needle, flushed and good blood return noted.

## 2021-02-28 ENCOUNTER — HOSPITAL ENCOUNTER (OUTPATIENT)
Age: 86
Discharge: HOME | End: 2021-02-28
Attending: INTERNAL MEDICINE | Admitting: INTERNAL MEDICINE
Payer: MEDICARE

## 2021-02-28 PROCEDURE — 96365 THER/PROPH/DIAG IV INF INIT: CPT

## 2021-02-28 PROCEDURE — 2580000003 HC RX 258: Performed by: INTERNAL MEDICINE

## 2021-02-28 PROCEDURE — 6360000002 HC RX W HCPCS: Performed by: INTERNAL MEDICINE

## 2021-02-28 RX ORDER — HEPARIN SODIUM (PORCINE) LOCK FLUSH IV SOLN 100 UNIT/ML 100 UNIT/ML
300 SOLUTION INTRAVENOUS PRN
Status: DISCONTINUED | OUTPATIENT
Start: 2021-02-28 | End: 2021-02-28 | Stop reason: HOSPADM

## 2021-02-28 RX ORDER — HEPARIN SODIUM (PORCINE) LOCK FLUSH IV SOLN 100 UNIT/ML 100 UNIT/ML
300 SOLUTION INTRAVENOUS PRN
Status: CANCELLED | OUTPATIENT
Start: 2021-02-28

## 2021-02-28 RX ADMIN — SODIUM CHLORIDE 1000 MG: 900 INJECTION INTRAVENOUS at 09:40

## 2021-02-28 RX ADMIN — HEPARIN 300 UNITS: 100 SYRINGE at 10:12

## 2021-02-28 NOTE — PROGRESS NOTES
Infusion complete, line flushed with 30ml NS and 300U heparin. Pt is a/o no distress noted. Port is deaccessed, covered with a bandaid.

## 2021-02-28 NOTE — PROGRESS NOTES
Pt arrived a/o. Port accessed via sterile technique, blood return noted, line flushed abx initiate. d

## 2021-03-02 ENCOUNTER — OFFICE VISIT (OUTPATIENT)
Dept: INFECTIOUS DISEASES | Age: 86
End: 2021-03-02
Payer: MEDICARE

## 2021-03-02 ENCOUNTER — TELEPHONE (OUTPATIENT)
Dept: INFECTIOUS DISEASES | Age: 86
End: 2021-03-02

## 2021-03-02 VITALS
OXYGEN SATURATION: 98 % | HEART RATE: 79 BPM | BODY MASS INDEX: 27.01 KG/M2 | TEMPERATURE: 97.9 F | DIASTOLIC BLOOD PRESSURE: 65 MMHG | RESPIRATION RATE: 18 BRPM | WEIGHT: 134 LBS | SYSTOLIC BLOOD PRESSURE: 97 MMHG | HEIGHT: 59 IN

## 2021-03-02 DIAGNOSIS — N39.0 UTI DUE TO EXTENDED-SPECTRUM BETA LACTAMASE (ESBL) PRODUCING ESCHERICHIA COLI: Primary | ICD-10-CM

## 2021-03-02 DIAGNOSIS — Z16.12 UTI DUE TO EXTENDED-SPECTRUM BETA LACTAMASE (ESBL) PRODUCING ESCHERICHIA COLI: Primary | ICD-10-CM

## 2021-03-02 DIAGNOSIS — B96.29 UTI DUE TO EXTENDED-SPECTRUM BETA LACTAMASE (ESBL) PRODUCING ESCHERICHIA COLI: Primary | ICD-10-CM

## 2021-03-02 PROCEDURE — 4040F PNEUMOC VAC/ADMIN/RCVD: CPT | Performed by: INTERNAL MEDICINE

## 2021-03-02 PROCEDURE — 1123F ACP DISCUSS/DSCN MKR DOCD: CPT | Performed by: INTERNAL MEDICINE

## 2021-03-02 PROCEDURE — 1036F TOBACCO NON-USER: CPT | Performed by: INTERNAL MEDICINE

## 2021-03-02 PROCEDURE — G8417 CALC BMI ABV UP PARAM F/U: HCPCS | Performed by: INTERNAL MEDICINE

## 2021-03-02 PROCEDURE — G8484 FLU IMMUNIZE NO ADMIN: HCPCS | Performed by: INTERNAL MEDICINE

## 2021-03-02 PROCEDURE — G8427 DOCREV CUR MEDS BY ELIG CLIN: HCPCS | Performed by: INTERNAL MEDICINE

## 2021-03-02 PROCEDURE — 99214 OFFICE O/P EST MOD 30 MIN: CPT | Performed by: INTERNAL MEDICINE

## 2021-03-02 PROCEDURE — G8399 PT W/DXA RESULTS DOCUMENT: HCPCS | Performed by: INTERNAL MEDICINE

## 2021-03-02 PROCEDURE — 1090F PRES/ABSN URINE INCON ASSESS: CPT | Performed by: INTERNAL MEDICINE

## 2021-03-02 NOTE — PROGRESS NOTES
Infectious disease Consult Note      Patient: Marquise Abad  : 1935  Acct#:  [de-identified]     Date:  3/2/2021    Subjective:       History of Present Illness  Patient is a 80 y. 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 Status postop transurethral resection of residual bladder tumor 2020   Urine culture 2020 grew  E. coli that was sensitive to ampicillin/ sulbactam and Klebsiella ([address] was detected)  She was treated with Unasyn during her hospitalization, discharged on Augmentin. History of left lower extremity DVT, coronary artery disease, hypertension, hyperlipidemia, UTIs with history of ESBL, chronic renal disease, hydronephrosis. Prior bladder suspension surgery in   Urine culture on 2021 grew ESBL producing E. Coli  was treated with fosfomycin with no improvement, was hospitalized 2021 through 2021 was treated with IV meropenem, discharged on IV Invanz through her port that was completed 2021  Interval history 3/2/2021  She is feeling tired today, had frequency with urination, denied hematuria, denied fever or chills, no other symptoms    Past Medical History:   Diagnosis Date    Coronary artery disease     ESBL (extended spectrum beta-lactamase) producing bacteria infection 2015    E.  Coli urine    HH (hiatus hernia)     Hyperkalemia 2020    Hyperlipidemia     Hypertension     Hypotension 2020    Thyroid disease       Past Surgical History:   Procedure Laterality Date    ANGIOPLASTY      BLADDER SUSPENSION      CATARACT REMOVAL WITH IMPLANT Bilateral     TOTAL KNEE ARTHROPLASTY Right           Admission Meds  Current Outpatient Medications on File Prior to Visit Medication Sig Dispense Refill    ferrous sulfate (IRON 325) 325 (65 Fe) MG tablet Take 1 tablet by mouth 2 times daily (with meals) 30 tablet 3    vitamin B-12 1000 MCG tablet Take 1 tablet by mouth daily 30 tablet 3    pravastatin (PRAVACHOL) 80 MG tablet Take 80 mg by mouth daily      HYDROcodone-acetaminophen (NORCO) 5-325 MG per tablet Take 1 tablet by mouth 2 times daily as needed for Pain.  famotidine (PEPCID) 10 MG tablet Take 20 mg by mouth 2 times daily       famotidine (PEPCID) 20 MG tablet Take 20 mg by mouth 2 times daily      acetaminophen (TYLENOL) 500 MG tablet Take 1,000 mg by mouth every 6 hours as needed for Pain      albuterol (PROVENTIL) (2.5 MG/3ML) 0.083% nebulizer solution Take 3 mLs by nebulization every 6 hours as needed for Wheezing 120 each 3    budesonide-formoterol (SYMBICORT) 80-4.5 MCG/ACT AERO Inhale 2 puffs into the lungs 2 times daily 1 Inhaler 3    aspirin 81 MG chewable tablet Take 1 tablet by mouth daily 30 tablet 3    clopidogrel (PLAVIX) 75 MG tablet Take 1 tablet by mouth daily 30 tablet 3    lisinopril (PRINIVIL;ZESTRIL) 5 MG tablet Take 5 mg by mouth daily       latanoprost (XALATAN) 0.005 % ophthalmic solution Place 1 drop into both eyes nightly      senna-docusate (PERICOLACE) 8.6-50 MG per tablet Take 1 tablet by mouth daily as needed constipation      levothyroxine (SYNTHROID) 75 MCG tablet Take 75 mcg by mouth daily       No current facility-administered medications on file prior to visit. Allergies  Allergies   Allergen Reactions    Bactrim [Sulfamethoxazole-Trimethoprim] Hives    Gentamycin [Gentamicin]     Sulfamethoxazole-Trimethoprim         Social   Social History     Tobacco Use    Smoking status: Never Smoker    Smokeless tobacco: Never Used   Substance Use Topics    Alcohol use:  No               Family History   Problem Relation Age of Onset    Diabetes Brother     Stroke Father     Coronary Art Dis Other siblings X3          Review of Systems  No fever / chills / sweats. No weight loss. No visual change, eye pain, eye discharge. No oral lesion, sore throat, dysphagia. Denies cough / sputum. Denies chest pain, palpitations. Denies n / v / abd pain. No diarrhea. Denies joint swelling or pain. No myalgia, arthralgia. Denies focal weakness, sensory change or other neurologic symptom  No lymph node swelling or tenderness. Other than above 12systems reviewed were negative . Physical Exam  BP 97/65   Pulse 79   Temp 97.9 °F (36.6 °C)   Resp 18   Ht 4' 11\" (1.499 m)   Wt 134 lb (60.8 kg)   SpO2 98%   BMI 27.06 kg/m²           General Appearance: alert and oriented to person, place and time, well-developed and well-nourished, in no acute distress  Skin: warm and dry, no rash or erythema  Head: normocephalic and atraumatic  Eyes: pupils equal, round, and reactive to light  ENT: hearing grossly normal bilaterally. Neck: neck supple and non tender . Pulmonary/Chest: clear to auscultation bilaterally- no wheezes, rales or rhonchi, normal air movement, no respiratory distress  Cardiovascular: normal rate, regular rhythm, normal S1 and S2, no murmurs.   Abdomen: soft, non-tender, non-distended, mild suprapubic tenderness  Extremities: no cyanosis, clubbing   Bilateral lower extremity edema  Musculoskeletal: normal range of motion, no joint swelling, deformity or tenderness  Neurologic: no cranial nerve deficit and muscle strength normal  Port site with no erythema or tenderness  Data Review:    WBC   Date Value Ref Range Status   02/19/2021 5.9 3.5 - 11.0 k/uL Final   02/18/2021 8.7 3.5 - 11.0 k/uL Final   05/06/2020 13.6 (H) 3.5 - 11.3 k/uL Final     Hemoglobin   Date Value Ref Range Status   02/19/2021 8.4 (L) 12.0 - 16.0 g/dL Final   02/18/2021 9.2 (L) 12.0 - 16.0 g/dL Final   05/28/2020 10.3 (L) 12.0 - 16.0 g/dL Final     Hematocrit   Date Value Ref Range Status 02/19/2021 25.6 (L) 36 - 46 % Final   02/18/2021 28.0 (L) 36 - 46 % Final   05/28/2020 32.9 (L) 36 - 46 % Final     MCV   Date Value Ref Range Status   02/19/2021 96.3 80 - 100 fL Final   02/18/2021 95.8 80 - 100 fL Final   05/06/2020 101.5 82.6 - 102.9 fL Final     Platelets   Date Value Ref Range Status   02/19/2021 333 150 - 450 k/uL Final   02/18/2021 369 150 - 450 k/uL Final   05/06/2020 397 138 - 453 k/uL Final     Sodium   Date Value Ref Range Status   02/19/2021 138 135 - 144 mmol/L Final   02/18/2021 133 (L) 135 - 144 mmol/L Final   05/28/2020 136 135 - 144 mmol/L Final     Potassium   Date Value Ref Range Status   02/19/2021 4.8 3.7 - 5.3 mmol/L Final   02/18/2021 4.9 3.7 - 5.3 mmol/L Final   05/28/2020 3.9 3.7 - 5.3 mmol/L Final     Chloride   Date Value Ref Range Status   02/19/2021 109 (H) 98 - 107 mmol/L Final   02/18/2021 99 98 - 107 mmol/L Final   05/28/2020 97 (L) 98 - 107 mmol/L Final     CO2   Date Value Ref Range Status   02/19/2021 20 20 - 31 mmol/L Final   02/18/2021 21 20 - 31 mmol/L Final   05/28/2020 24 20 - 31 mmol/L Final     BUN   Date Value Ref Range Status   02/19/2021 17 8 - 23 mg/dL Final   02/18/2021 23 8 - 23 mg/dL Final   05/28/2020 11 8 - 23 mg/dL Final     CREATININE   Date Value Ref Range Status   02/19/2021 1.79 (H) 0.50 - 0.90 mg/dL Final   02/18/2021 2.01 (H) 0.50 - 0.90 mg/dL Final   05/28/2020 0.85 0.50 - 0.90 mg/dL Final     AST   Date Value Ref Range Status   02/19/2021 11 <32 U/L Final   04/27/2020 22 <32 U/L Final   05/23/2017 18 <32 U/L Final     Comment:     Performed at [address] [place] [address] Dr Mark Panchal.  [address]   [phone]       ALT   Date Value Ref Range Status   02/19/2021 <5 (L) 5 - 33 U/L Final   04/27/2020 14 5 - 33 U/L Final   04/25/2019 12 5 - 33 U/L Final     Total Bilirubin   Date Value Ref Range Status   02/19/2021 <0.15 (L) 0.3 - 1.2 mg/dL Final   04/27/2020 0.22 (L) 0.3 - 1.2 mg/dL Final 07/12/2016 0.35 0.3 - 1.2 mg/dL Final     Alkaline Phosphatase   Date Value Ref Range Status   02/19/2021 73 35 - 104 U/L Final   04/27/2020 82 35 - 104 U/L Final   07/12/2016 80 35 - 104 U/L Final     No results found for: LIPASE, AMYLASE  Protime   Date Value Ref Range Status   04/27/2020 13.0 11.8 - 14.6 sec Final   08/03/2013 10.4 9.4 - 12.6 sec Final     INR   Date Value Ref Range Status   04/27/2020 1.0  Final     Comment:           Non-therapeutic Range:     INR = 0.9-1.2  Therapeutic Range: Moderate Anticoagulant Intensity:     INR = 2.0-3.0   High Anticoagulant Intensity:     INR = 2.5-3.5           08/03/2013 1.0  Final     Comment:           Therapeutic Range: Moderate Anticoagulant Intensity:     INR = 2.0-3.0   High Anticoagulant Intensity:     INR = 2.5-3.5        [facility] [id number] [place] [address] 3 [phone]         Imaging Studies:                           All appropriate imaging studies and reports reviewed: Yes                 Assessment:   Recurrent UTI with  ESBL producing E. coli  Urinary retention  T2 urothelial cell carcinoma status post  tumor resection 8/24/2020 initially followed by  5 cycles of fu /mitomycin and radiation that was completed. Status postop transurethral resection of residual bladder tumor 12/30/2020  History of DVT on anticoagulation  Coronary artery disease  Hypertension  Hyperlipidemia    Recommendations:   Finished IV Invanz course 2/28/2021  Plan for urology procedure/Botox injection tomorrow. Prophylactic IV ertapenem prior to her procedure. Thank you for allowing me to participate in the care of your patient. Please feel free to contact me with any questions or concerns.      Lorenzo George MD

## 2021-03-02 NOTE — TELEPHONE ENCOUNTER
After the patient left Dr. Lester Cervantes asked me to find out where the patient is having her cystoscopy. Dr. Lester Cervantes wants the patient to have 1 gram IV of Ertapenem prior to the procedure.

## 2021-03-02 NOTE — TELEPHONE ENCOUNTER
Patient is all set. She is scheduled for the cystoscopy tomorrow. I faxed the orders to [facility] [employer] at the surgery center.

## 2021-03-21 PROBLEM — N39.0 UTI (URINARY TRACT INFECTION): Status: RESOLVED | Noted: 2021-02-19 | Resolved: 2021-03-21

## 2021-03-23 ENCOUNTER — TELEPHONE (OUTPATIENT)
Dept: INFECTIOUS DISEASES | Age: 86
End: 2021-03-23

## 2021-03-23 NOTE — TELEPHONE ENCOUNTER
Can you call her pharmacy for nitrofurntoin 100 mg bid for 2 weeks please  Perfect  from  [facility] Called patient and informed and spoke to [facility] the pharmacist at [facility].

## 2021-03-25 RX ORDER — NITROFURANTOIN 25; 75 MG/1; MG/1
100 CAPSULE ORAL 2 TIMES DAILY
Qty: 28 CAPSULE | Refills: 0 | Status: ON HOLD | OUTPATIENT
Start: 2021-03-25 | End: 2021-04-02 | Stop reason: HOSPADM

## 2021-03-30 ENCOUNTER — TELEPHONE (OUTPATIENT)
Dept: INFECTIOUS DISEASES | Age: 86
End: 2021-03-30

## 2021-03-30 NOTE — TELEPHONE ENCOUNTER
Patient called, states the antibiotic isnt working for her. She still has burning and pain. She also thought she remembered you saying at one time that she needs a liquid antibiotic? But she could be mistaken on that.  Please advise

## 2021-03-31 ENCOUNTER — HOSPITAL ENCOUNTER (INPATIENT)
Age: 86
LOS: 2 days | Discharge: HOME | DRG: 690 | End: 2021-04-02
Attending: EMERGENCY MEDICINE | Admitting: FAMILY MEDICINE
Payer: MEDICARE

## 2021-03-31 DIAGNOSIS — Z16.24 NEWLY DIAGNOSED INFECTION DUE TO MULTIDRUG RESISTANT ORGANISM: Primary | ICD-10-CM

## 2021-03-31 DIAGNOSIS — N30.00 ACUTE CYSTITIS WITHOUT HEMATURIA: ICD-10-CM

## 2021-03-31 PROBLEM — Z86.19 HISTORY OF ESBL E. COLI INFECTION: Status: ACTIVE | Noted: 2021-03-31

## 2021-03-31 LAB
-: ABNORMAL
ABSOLUTE EOS #: 0.12 K/UL (ref 0–0.4)
ABSOLUTE IMMATURE GRANULOCYTE: ABNORMAL K/UL (ref 0–0.3)
ABSOLUTE LYMPH #: 1.28 K/UL (ref 1–4.8)
ABSOLUTE MONO #: 1.62 K/UL (ref 0.1–1.3)
ALBUMIN SERPL-MCNC: 3.4 G/DL (ref 3.5–5.2)
ALBUMIN/GLOBULIN RATIO: ABNORMAL (ref 1–2.5)
ALP BLD-CCNC: 103 U/L (ref 35–104)
ALT SERPL-CCNC: <5 U/L (ref 5–33)
AMORPHOUS: ABNORMAL
ANION GAP SERPL CALCULATED.3IONS-SCNC: 11 MMOL/L (ref 9–17)
AST SERPL-CCNC: 13 U/L
BACTERIA: ABNORMAL
BASOPHILS # BLD: 0 % (ref 0–2)
BASOPHILS ABSOLUTE: 0 K/UL (ref 0–0.2)
BILIRUB SERPL-MCNC: 0.16 MG/DL (ref 0.3–1.2)
BILIRUBIN URINE: NEGATIVE
BUN BLDV-MCNC: 20 MG/DL (ref 8–23)
BUN/CREAT BLD: ABNORMAL (ref 9–20)
CALCIUM SERPL-MCNC: 9.5 MG/DL (ref 8.6–10.4)
CASTS UA: ABNORMAL /LPF
CHLORIDE BLD-SCNC: 97 MMOL/L (ref 98–107)
CO2: 25 MMOL/L (ref 20–31)
COLOR: YELLOW
COMMENT UA: ABNORMAL
CREAT SERPL-MCNC: 1.73 MG/DL (ref 0.5–0.9)
CRYSTALS, UA: ABNORMAL /HPF
DIFFERENTIAL TYPE: ABNORMAL
EOSINOPHILS RELATIVE PERCENT: 1 % (ref 0–4)
EPITHELIAL CELLS UA: ABNORMAL /HPF
GFR AFRICAN AMERICAN: 34 ML/MIN
GFR NON-AFRICAN AMERICAN: 28 ML/MIN
GFR SERPL CREATININE-BSD FRML MDRD: ABNORMAL ML/MIN/{1.73_M2}
GFR SERPL CREATININE-BSD FRML MDRD: ABNORMAL ML/MIN/{1.73_M2}
GLUCOSE BLD-MCNC: 96 MG/DL (ref 70–99)
GLUCOSE URINE: NEGATIVE
HCT VFR BLD CALC: 29 % (ref 36–46)
HEMOGLOBIN: 9.8 G/DL (ref 12–16)
IMMATURE GRANULOCYTES: ABNORMAL %
KETONES, URINE: NEGATIVE
LEUKOCYTE ESTERASE, URINE: ABNORMAL
LYMPHOCYTES # BLD: 11 % (ref 24–44)
MCH RBC QN AUTO: 30.9 PG (ref 26–34)
MCHC RBC AUTO-ENTMCNC: 33.6 G/DL (ref 31–37)
MCV RBC AUTO: 91.9 FL (ref 80–100)
MONOCYTES # BLD: 14 % (ref 1–7)
MORPHOLOGY: NORMAL
MUCUS: ABNORMAL
NITRITE, URINE: NEGATIVE
NRBC AUTOMATED: ABNORMAL PER 100 WBC
OTHER OBSERVATIONS UA: ABNORMAL
PDW BLD-RTO: 14.8 % (ref 11.5–14.9)
PH UA: 7.5 (ref 5–8)
PLATELET # BLD: 396 K/UL (ref 150–450)
PLATELET ESTIMATE: ABNORMAL
PMV BLD AUTO: 5.9 FL (ref 6–12)
POTASSIUM SERPL-SCNC: 5.4 MMOL/L (ref 3.7–5.3)
PROTEIN UA: ABNORMAL
RBC # BLD: 3.16 M/UL (ref 4–5.2)
RBC # BLD: ABNORMAL 10*6/UL
RBC UA: ABNORMAL /HPF
RENAL EPITHELIAL, UA: ABNORMAL /HPF
SEG NEUTROPHILS: 74 % (ref 36–66)
SEGMENTED NEUTROPHILS ABSOLUTE COUNT: 8.58 K/UL (ref 1.3–9.1)
SODIUM BLD-SCNC: 133 MMOL/L (ref 135–144)
SPECIFIC GRAVITY UA: 1.01 (ref 1–1.03)
TOTAL PROTEIN: 7.8 G/DL (ref 6.4–8.3)
TRICHOMONAS: ABNORMAL
TURBIDITY: ABNORMAL
URINE HGB: ABNORMAL
UROBILINOGEN, URINE: NORMAL
WBC # BLD: 11.6 K/UL (ref 3.5–11)
WBC # BLD: ABNORMAL 10*3/UL
WBC UA: ABNORMAL /HPF
YEAST: ABNORMAL

## 2021-03-31 PROCEDURE — 6370000000 HC RX 637 (ALT 250 FOR IP): Performed by: EMERGENCY MEDICINE

## 2021-03-31 PROCEDURE — 99283 EMERGENCY DEPT VISIT LOW MDM: CPT

## 2021-03-31 PROCEDURE — 87086 URINE CULTURE/COLONY COUNT: CPT

## 2021-03-31 PROCEDURE — 6360000002 HC RX W HCPCS: Performed by: FAMILY MEDICINE

## 2021-03-31 PROCEDURE — 6370000000 HC RX 637 (ALT 250 FOR IP): Performed by: FAMILY MEDICINE

## 2021-03-31 PROCEDURE — 81001 URINALYSIS AUTO W/SCOPE: CPT

## 2021-03-31 PROCEDURE — 85025 COMPLETE CBC W/AUTO DIFF WBC: CPT

## 2021-03-31 PROCEDURE — 87088 URINE BACTERIA CULTURE: CPT

## 2021-03-31 PROCEDURE — 87077 CULTURE AEROBIC IDENTIFY: CPT

## 2021-03-31 PROCEDURE — 2060000000 HC ICU INTERMEDIATE R&B

## 2021-03-31 PROCEDURE — 2580000003 HC RX 258: Performed by: EMERGENCY MEDICINE

## 2021-03-31 PROCEDURE — 36415 COLL VENOUS BLD VENIPUNCTURE: CPT

## 2021-03-31 PROCEDURE — 80053 COMPREHEN METABOLIC PANEL: CPT

## 2021-03-31 PROCEDURE — 6360000002 HC RX W HCPCS: Performed by: EMERGENCY MEDICINE

## 2021-03-31 PROCEDURE — 87040 BLOOD CULTURE FOR BACTERIA: CPT

## 2021-03-31 PROCEDURE — 87186 SC STD MICRODIL/AGAR DIL: CPT

## 2021-03-31 RX ORDER — MAGNESIUM SULFATE 1 G/100ML
1000 INJECTION INTRAVENOUS PRN
Status: DISCONTINUED | OUTPATIENT
Start: 2021-03-31 | End: 2021-03-31

## 2021-03-31 RX ORDER — FERROUS SULFATE 325(65) MG
325 TABLET ORAL
COMMUNITY

## 2021-03-31 RX ORDER — FAMOTIDINE 20 MG/1
20 TABLET, FILM COATED ORAL 2 TIMES DAILY
Status: DISCONTINUED | OUTPATIENT
Start: 2021-03-31 | End: 2021-04-01

## 2021-03-31 RX ORDER — PROMETHAZINE HYDROCHLORIDE 25 MG/1
12.5 TABLET ORAL EVERY 6 HOURS PRN
Status: DISCONTINUED | OUTPATIENT
Start: 2021-03-31 | End: 2021-04-02 | Stop reason: HOSPADM

## 2021-03-31 RX ORDER — FERROUS SULFATE 325(65) MG
325 TABLET ORAL 2 TIMES DAILY WITH MEALS
Status: DISCONTINUED | OUTPATIENT
Start: 2021-03-31 | End: 2021-04-02 | Stop reason: HOSPADM

## 2021-03-31 RX ORDER — PHENAZOPYRIDINE HYDROCHLORIDE 100 MG/1
100 TABLET, FILM COATED ORAL
Status: DISCONTINUED | OUTPATIENT
Start: 2021-03-31 | End: 2021-03-31

## 2021-03-31 RX ORDER — HYDROCODONE BITARTRATE AND ACETAMINOPHEN 5; 325 MG/1; MG/1
1 TABLET ORAL 2 TIMES DAILY PRN
Status: DISCONTINUED | OUTPATIENT
Start: 2021-03-31 | End: 2021-04-02 | Stop reason: HOSPADM

## 2021-03-31 RX ORDER — ACETAMINOPHEN 650 MG/1
650 SUPPOSITORY RECTAL EVERY 6 HOURS PRN
Status: DISCONTINUED | OUTPATIENT
Start: 2021-03-31 | End: 2021-04-02 | Stop reason: HOSPADM

## 2021-03-31 RX ORDER — LANOLIN ALCOHOL/MO/W.PET/CERES
1000 CREAM (GRAM) TOPICAL DAILY
Status: DISCONTINUED | OUTPATIENT
Start: 2021-03-31 | End: 2021-04-02 | Stop reason: HOSPADM

## 2021-03-31 RX ORDER — POTASSIUM CHLORIDE 7.45 MG/ML
10 INJECTION INTRAVENOUS PRN
Status: DISCONTINUED | OUTPATIENT
Start: 2021-03-31 | End: 2021-03-31

## 2021-03-31 RX ORDER — BUDESONIDE AND FORMOTEROL FUMARATE DIHYDRATE 80; 4.5 UG/1; UG/1
2 AEROSOL RESPIRATORY (INHALATION) 2 TIMES DAILY
Status: DISCONTINUED | OUTPATIENT
Start: 2021-03-31 | End: 2021-04-02 | Stop reason: HOSPADM

## 2021-03-31 RX ORDER — POTASSIUM CHLORIDE 20 MEQ/1
40 TABLET, EXTENDED RELEASE ORAL PRN
Status: DISCONTINUED | OUTPATIENT
Start: 2021-03-31 | End: 2021-03-31

## 2021-03-31 RX ORDER — ONDANSETRON 2 MG/ML
4 INJECTION INTRAMUSCULAR; INTRAVENOUS EVERY 6 HOURS PRN
Status: DISCONTINUED | OUTPATIENT
Start: 2021-03-31 | End: 2021-04-02 | Stop reason: HOSPADM

## 2021-03-31 RX ORDER — ACETAMINOPHEN 325 MG/1
650 TABLET ORAL EVERY 6 HOURS PRN
Status: DISCONTINUED | OUTPATIENT
Start: 2021-03-31 | End: 2021-04-02 | Stop reason: HOSPADM

## 2021-03-31 RX ORDER — PHENAZOPYRIDINE HYDROCHLORIDE 200 MG/1
200 TABLET, FILM COATED ORAL ONCE
Status: COMPLETED | OUTPATIENT
Start: 2021-03-31 | End: 2021-03-31

## 2021-03-31 RX ORDER — SODIUM CHLORIDE 0.9 % (FLUSH) 0.9 %
10 SYRINGE (ML) INJECTION EVERY 12 HOURS SCHEDULED
Status: DISCONTINUED | OUTPATIENT
Start: 2021-03-31 | End: 2021-04-02 | Stop reason: HOSPADM

## 2021-03-31 RX ORDER — SODIUM CHLORIDE 9 MG/ML
25 INJECTION, SOLUTION INTRAVENOUS PRN
Status: DISCONTINUED | OUTPATIENT
Start: 2021-03-31 | End: 2021-04-02 | Stop reason: HOSPADM

## 2021-03-31 RX ORDER — SODIUM CHLORIDE 0.9 % (FLUSH) 0.9 %
10 SYRINGE (ML) INJECTION PRN
Status: DISCONTINUED | OUTPATIENT
Start: 2021-03-31 | End: 2021-04-02 | Stop reason: HOSPADM

## 2021-03-31 RX ORDER — PRAVASTATIN SODIUM 40 MG
80 TABLET ORAL DAILY
Status: DISCONTINUED | OUTPATIENT
Start: 2021-03-31 | End: 2021-04-02 | Stop reason: HOSPADM

## 2021-03-31 RX ORDER — LISINOPRIL 5 MG/1
5 TABLET ORAL DAILY
Status: DISCONTINUED | OUTPATIENT
Start: 2021-03-31 | End: 2021-04-01

## 2021-03-31 RX ORDER — ALBUTEROL SULFATE 2.5 MG/3ML
2.5 SOLUTION RESPIRATORY (INHALATION) EVERY 6 HOURS PRN
Status: DISCONTINUED | OUTPATIENT
Start: 2021-03-31 | End: 2021-04-02 | Stop reason: HOSPADM

## 2021-03-31 RX ORDER — ASPIRIN 81 MG/1
81 TABLET, CHEWABLE ORAL DAILY
Status: DISCONTINUED | OUTPATIENT
Start: 2021-03-31 | End: 2021-04-02 | Stop reason: HOSPADM

## 2021-03-31 RX ORDER — LEVOTHYROXINE SODIUM 0.07 MG/1
75 TABLET ORAL DAILY
Status: DISCONTINUED | OUTPATIENT
Start: 2021-03-31 | End: 2021-04-02 | Stop reason: HOSPADM

## 2021-03-31 RX ORDER — CLOPIDOGREL BISULFATE 75 MG/1
75 TABLET ORAL DAILY
Status: DISCONTINUED | OUTPATIENT
Start: 2021-03-31 | End: 2021-04-02 | Stop reason: HOSPADM

## 2021-03-31 RX ORDER — LATANOPROST 50 UG/ML
1 SOLUTION/ DROPS OPHTHALMIC NIGHTLY
Status: DISCONTINUED | OUTPATIENT
Start: 2021-03-31 | End: 2021-04-02 | Stop reason: HOSPADM

## 2021-03-31 RX ADMIN — PRAVASTATIN SODIUM 80 MG: 40 TABLET ORAL at 22:33

## 2021-03-31 RX ADMIN — HYDROCODONE BITARTRATE AND ACETAMINOPHEN 1 TABLET: 5; 325 TABLET ORAL at 22:43

## 2021-03-31 RX ADMIN — ENOXAPARIN SODIUM 30 MG: 30 INJECTION SUBCUTANEOUS at 15:58

## 2021-03-31 RX ADMIN — FAMOTIDINE 20 MG: 20 TABLET ORAL at 22:33

## 2021-03-31 RX ADMIN — PHENAZOPYRIDINE HYDROCHLORIDE 200 MG: 200 TABLET ORAL at 13:21

## 2021-03-31 RX ADMIN — FERROUS SULFATE TAB 325 MG (65 MG ELEMENTAL FE) 325 MG: 325 (65 FE) TAB at 22:33

## 2021-03-31 RX ADMIN — MAGNESIUM HYDROXIDE 30 ML: 400 SUSPENSION ORAL at 22:33

## 2021-03-31 RX ADMIN — LATANOPROST 1 DROP: 50 SOLUTION OPHTHALMIC at 23:03

## 2021-03-31 RX ADMIN — MEROPENEM 1000 MG: 1 INJECTION, POWDER, FOR SOLUTION INTRAVENOUS at 13:23

## 2021-03-31 ASSESSMENT — ENCOUNTER SYMPTOMS
CHEST TIGHTNESS: 0
VOMITING: 0
EYE REDNESS: 0
TROUBLE SWALLOWING: 0
BLOOD IN STOOL: 0
COUGH: 0
NAUSEA: 0
ABDOMINAL PAIN: 1
COLOR CHANGE: 0
SHORTNESS OF BREATH: 0
EYE ITCHING: 0

## 2021-03-31 ASSESSMENT — PAIN SCALES - GENERAL: PAINLEVEL_OUTOF10: 7

## 2021-03-31 ASSESSMENT — PAIN DESCRIPTION - PAIN TYPE
TYPE: ACUTE PAIN
TYPE: ACUTE PAIN

## 2021-03-31 ASSESSMENT — PAIN DESCRIPTION - LOCATION
LOCATION: VAGINA
LOCATION: VAGINA

## 2021-03-31 ASSESSMENT — PAIN SCALES - WONG BAKER: WONGBAKER_NUMERICALRESPONSE: 0

## 2021-03-31 NOTE — PROGRESS NOTES
Patient admitted to 2084 around 1345. Vitals taken, telemetry applied, admission assessment completed. No signs or symptoms of distress. Belongings gathered into closet. Call light in reach, side rails up x2, bedside table in reach. Room free of clutter.

## 2021-03-31 NOTE — PLAN OF CARE
Problem: Falls - Risk of:  Goal: Will remain free from falls  Description: Will remain free from falls  Outcome: Ongoing   Patient remained free of falls. Call light in reach, side rails up x2, bedside table in reach. Room free of clutter. Problem: Skin Integrity:  Goal: Will show no infection signs and symptoms  Description: Will show no infection signs and symptoms  Outcome: Ongoing   No new signs of skin breakdown. Patient turns self. Problem: Pain:  Goal: Pain level will decrease  Description: Pain level will decrease  Outcome: Ongoing   Patient has not asked for any pain medication.  Knows it is available upon request.

## 2021-03-31 NOTE — ED NOTES
Skin is red to patient's left elbow and left knee with granulated tissue noted to left knee.       Tonny Gray RN  03/31/21 [id number]

## 2021-03-31 NOTE — TELEPHONE ENCOUNTER
Berna Hicks MD 3/30/2021 10:12 AM 2 things: I sent you a sick call for Marivel Brownlee and her UTI    Ángel Sayandre is admitted in room 2067. His wife left a message that they want you to come see him. Nica Matt Read 3/30/2021 3:52 PM 3/30/2021 3:51 PM Marivel Brownlee called again, she is unable to tolerate the pain. Can you please review your messages? Read 3/30/2021 3:52 PM 3/30/2021 3:53 PM She needs to go to Er if she in so much pain 3/30/2021 4:15 PM Do you want to try to treat her with something else? She is currently on: \"nitrofurntoin 100 mg bid for 2 weeks\" as of 3/23/21 but it isn't helping.  Nica Matt Read 3/30/2021 4:25 PM   3/30/2021 4:26 PM Maybe needs IV 3/30/2021 4:26 PM go to Er

## 2021-03-31 NOTE — H&P
History and Physical      Name: Miroslava Joseph  MRN: [id number]: [de-identified]  Room: [id number]    Admit Date: 3/31/2021  PCP: Con Patel      Chief Complaint:     Chief Complaint   Patient presents with    Vaginal Pain     Has bladder spasms when standing. Fine if sitting. History Obtained From:     patient, electronic medical record    History of Present Illness:      Miroslava Joseph is a  80 y.o.  female with a medical history of recurrent ESBL UTI presents with Vaginal Pain (Has bladder spasms when standing. Fine if sitting. )  Patient states that she started having dysuria and bladder spasms for the last 10 days. Patient has suprapubic discomfort. Patient denies abdominal pain, flank pain nausea vomiting, chest pain shortness of breath, cough, lightheadedness fever or chills. Patient is taking nitrofurantoin for UTI without any relief in symptoms. Past Medical History:     Past Medical History:   Diagnosis Date    Cancer [facility])     bladder, finished tx 12/10/2020    Coronary artery disease     ESBL (extended spectrum beta-lactamase) producing bacteria infection 6/30/2015    E. Coli urine    HH (hiatus hernia)     Hyperkalemia 4/29/2020    Hyperlipidemia     Hypertension     Hypotension 4/29/2020    Thyroid disease         Past Surgical History:     Past Surgical History:   Procedure Laterality Date    ANGIOPLASTY      BLADDER SUSPENSION      CATARACT REMOVAL WITH IMPLANT Bilateral     TOTAL KNEE ARTHROPLASTY Right         Medications Prior to Admission:       Prior to Admission medications    Medication Sig Start Date End Date Taking?  Authorizing Provider   ferrous sulfate (IRON 325) 325 (65 Fe) MG tablet Take 325 mg by mouth Daily with supper   Yes Historical Provider, MD   nitrofurantoin, macrocrystal-monohydrate, (MACROBID) 100 MG capsule Take 1 capsule by mouth 2 times daily for 14 days 3/25/21 4/8/21 Yes Chula Sims MD   vitamin B-12 1000 MCG tablet Take 1 itching. Respiratory: Negative for cough, chest tightness and shortness of breath. Cardiovascular: Negative for chest pain, palpitations and leg swelling. Gastrointestinal: Positive for abdominal pain (Suprapubic). Negative for blood in stool, nausea and vomiting. Endocrine: Negative for heat intolerance and polyphagia. Genitourinary: Positive for dysuria. Negative for decreased urine volume, difficulty urinating, flank pain, hematuria, pelvic pain and vaginal bleeding. Musculoskeletal: Negative for arthralgias, joint swelling and neck stiffness. Skin: Negative for color change and pallor. Allergic/Immunologic: Negative for food allergies. Neurological: Negative for dizziness, seizures and headaches. Hematological: Does not bruise/bleed easily. Psychiatric/Behavioral: Negative for agitation, behavioral problems and suicidal ideas. The patient is not hyperactive. Code Status:  Full Code    Physical Exam:     Vitals:  /68   Pulse 83   Temp 97.7 °F (36.5 °C) (Oral)   Resp 18   Ht 4' 11\" (1.499 m)   Wt 129 lb (58.5 kg)   SpO2 100%   BMI 26.05 kg/m²   Temp (24hrs), Av.9 °F (36.6 °C), Min:97.7 °F (36.5 °C), Max:98 °F (36.7 °C)        Physical Exam  Vitals signs reviewed. HENT:      Head: Normocephalic. Right Ear: External ear normal.      Left Ear: External ear normal.      Nose: Nose normal.      Mouth/Throat:      Mouth: Mucous membranes are moist.      Pharynx: Oropharynx is clear. Eyes:      Conjunctiva/sclera: Conjunctivae normal.   Neck:      Musculoskeletal: Normal range of motion and neck supple. Cardiovascular:      Rate and Rhythm: Normal rate and regular rhythm. Pulses: Normal pulses. Heart sounds: Normal heart sounds. Pulmonary:      Effort: Pulmonary effort is normal.      Breath sounds: Normal breath sounds. Abdominal:      General: Bowel sounds are normal.      Palpations: Abdomen is soft. Tenderness:  There is no abdominal tenderness. There is no right CVA tenderness or left CVA tenderness. Musculoskeletal:         General: No deformity. Right lower leg: No edema. Left lower leg: No edema. Skin:     General: Skin is warm. Capillary Refill: Capillary refill takes less than 2 seconds. Coloration: Skin is not jaundiced. Neurological:      General: No focal deficit present. Mental Status: She is alert. Mental status is at baseline. Psychiatric:         Mood and Affect: Mood normal.         Behavior: Behavior normal.               Data:     Recent Results (from the past 24 hour(s))   Urinalysis Reflex to Culture    Collection Time: 03/31/21 11:16 AM    Specimen: Urine, clean catch   Result Value Ref Range    Color, UA YELLOW YELLOW    Turbidity UA TURBID (A) CLEAR    Glucose, Ur NEGATIVE NEGATIVE    Bilirubin Urine NEGATIVE NEGATIVE    Ketones, Urine NEGATIVE NEGATIVE    Specific [place], UA 1.014 1.000 - 1.030    Urine Hgb MOD (A) NEGATIVE    pH, UA 7.5 5.0 - 8.0    Protein, UA 2+ (A) NEGATIVE    Urobilinogen, Urine Normal Normal    Nitrite, Urine NEGATIVE NEGATIVE    Leukocyte Esterase, Urine MOD (A) NEGATIVE    Urinalysis Comments NOT REPORTED    Microscopic Urinalysis    Collection Time: 03/31/21 11:16 AM   Result Value Ref Range    -          WBC, UA TOO NUMEROUS TO COUNT /HPF    RBC, UA 0 TO 2 /HPF    Casts UA NOT REPORTED /LPF    Crystals, UA NOT REPORTED None /HPF    Epithelial Cells UA 0 TO 2 /HPF    Renal Epithelial, UA NOT REPORTED 0 /HPF    Bacteria, UA MANY (A) None    Mucus, UA 1+ (A) None    Trichomonas, UA NOT REPORTED None    Amorphous, UA 1+ (A) None    Other Observations UA NOT REPORTED NOT REQ.     Yeast, UA NOT REPORTED None   CBC with DIFF    Collection Time: 03/31/21  1:05 PM   Result Value Ref Range    WBC 11.6 (H) 3.5 - 11.0 k/uL    RBC 3.16 (L) 4.0 - 5.2 m/uL    Hemoglobin 9.8 (L) 12.0 - 16.0 g/dL    Hematocrit 29.0 (L) 36 - 46 %    MCV 91.9 80 - 100 fL    MCH 30.9 26 - 34 pg MCHC 33.6 31 - 37 g/dL    RDW 14.8 11.5 - 14.9 %    Platelets 681 158 - 554 k/uL    MPV 5.9 (L) 6.0 - 12.0 fL    NRBC Automated NOT REPORTED per 100 WBC    Differential Type NOT REPORTED     Immature Granulocytes NOT REPORTED 0 %    Absolute Immature Granulocyte NOT REPORTED 0.00 - 0.30 k/uL    WBC Morphology NOT REPORTED     RBC Morphology NOT REPORTED     Platelet Estimate NOT REPORTED     Seg Neutrophils 74 (H) 36 - 66 %    Lymphocytes 11 (L) 24 - 44 %    Monocytes 14 (H) 1 - 7 %    Eosinophils % 1 0 - 4 %    Basophils 0 0 - 2 %    Segs Absolute 8.58 1.3 - 9.1 k/uL    Absolute Lymph # 1.28 1.0 - 4.8 k/uL    Absolute Mono # 1.62 (H) 0.1 - 1.3 k/uL    Absolute Eos # 0.12 0.0 - 0.4 k/uL    Basophils Absolute 0.00 0.0 - 0.2 k/uL    Morphology Normal    Comprehensive Metabolic Panel    Collection Time: 03/31/21  1:05 PM   Result Value Ref Range    Glucose 96 70 - 99 mg/dL    BUN 20 8 - 23 mg/dL    CREATININE 1.73 (H) 0.50 - 0.90 mg/dL    Bun/Cre Ratio NOT REPORTED 9 - 20    Calcium 9.5 8.6 - 10.4 mg/dL    Sodium 133 (L) 135 - 144 mmol/L    Potassium 5.4 (H) 3.7 - 5.3 mmol/L    Chloride 97 (L) 98 - 107 mmol/L    CO2 25 20 - 31 mmol/L    Anion Gap 11 9 - 17 mmol/L    Alkaline Phosphatase 103 35 - 104 U/L    ALT <5 (L) 5 - 33 U/L    AST 13 <32 U/L    Total Bilirubin 0.16 (L) 0.3 - 1.2 mg/dL    Total Protein 7.8 6.4 - 8.3 g/dL    Albumin 3.4 (L) 3.5 - 5.2 g/dL    Albumin/Globulin Ratio NOT REPORTED 1.0 - 2.5    GFR Non-African American 28 (L) >60 mL/min    GFR  34 (L) >60 mL/min    GFR Comment          GFR Staging NOT REPORTED        Assesment:     Primary Problem  Acute cystitis    Principal Problem:    Acute cystitis  Active Problems:    ASCVD (arteriosclerotic cardiovascular disease)    Other specified hypothyroidism    Gastroesophageal reflux disease without esophagitis    Acute kidney injury superimposed on CKD (HCC)    Iron deficiency anemia    Stage 3 chronic kidney disease    History of ESBL E. coli infection    Acute infective cystitis  Resolved Problems:    * No resolved hospital problems. *      Plan:     1. IV meropenem  2. Urine culture  3. Blood Culture x2  4. Pyridium 100 mg p.o. 3 times a day  5. Consult ID  6. CBC, CMP  7. DVT prophylaxis Lovenox 40 mg subcu daily  8. EPCs  9.  check and replace electrolytes per sliding scale  10.   restart home medications        Electronically signed by Reather Meckel, MD     Copy sent to Dr. Josafat Rubin

## 2021-03-31 NOTE — FLOWSHEET NOTE
Patient in bed,  sitting in bedside chair visiting with wife. Patient stated that her  and her just updated their AD, she does have a copy at home. Patient talked about all the many hospital visits she had the last year. Patient is hoping that she is recovery and hopefully will not need to come back to the hospital.  It has been a long tough year for everyone. Patient has good support from her family and friends. [place] will continued to offer emotional and spiritual support. 03/31/21 1800   Encounter Summary   Services provided to: Patient and family together  ()   Referral/Consult From: Other    Support System Spouse; Children;Family members; [employer]/rowan community;Friends/neighbors   [facility] of [facility] Visiting   (3/31/21)   Complexity of Encounter Low   Length of Encounter 15 minutes   Spiritual Assessment Completed Yes   Advance Directives (For Healthcare)   Healthcare Directive Yes, patient has an advance directive for healthcare treatment  (Pt stated that it was updated and she has copy at home.)   Type of Healthcare Directive Durable power of  for health care;Living will   Copy in Chart Other (Comment)  (Patient  stated she has copy at home.)   Visited by Zack Chambers

## 2021-04-01 LAB
ABSOLUTE EOS #: 0.2 K/UL (ref 0–0.4)
ABSOLUTE IMMATURE GRANULOCYTE: ABNORMAL K/UL (ref 0–0.3)
ABSOLUTE LYMPH #: 0.7 K/UL (ref 1–4.8)
ABSOLUTE MONO #: 0.9 K/UL (ref 0.1–1.3)
ALBUMIN SERPL-MCNC: 2.4 G/DL (ref 3.5–5.2)
ALBUMIN/GLOBULIN RATIO: ABNORMAL (ref 1–2.5)
ALP BLD-CCNC: 81 U/L (ref 35–104)
ALT SERPL-CCNC: 5 U/L (ref 5–33)
ANION GAP SERPL CALCULATED.3IONS-SCNC: 8 MMOL/L (ref 9–17)
AST SERPL-CCNC: 12 U/L
BASOPHILS # BLD: 1 % (ref 0–2)
BASOPHILS ABSOLUTE: 0.1 K/UL (ref 0–0.2)
BILIRUB SERPL-MCNC: <0.15 MG/DL (ref 0.3–1.2)
BUN BLDV-MCNC: 18 MG/DL (ref 8–23)
BUN/CREAT BLD: ABNORMAL (ref 9–20)
CALCIUM SERPL-MCNC: 8.2 MG/DL (ref 8.6–10.4)
CHLORIDE BLD-SCNC: 105 MMOL/L (ref 98–107)
CO2: 22 MMOL/L (ref 20–31)
CREAT SERPL-MCNC: 1.64 MG/DL (ref 0.5–0.9)
CULTURE: ABNORMAL
CULTURE: ABNORMAL
DIFFERENTIAL TYPE: ABNORMAL
EOSINOPHILS RELATIVE PERCENT: 2 % (ref 0–4)
GFR AFRICAN AMERICAN: 36 ML/MIN
GFR NON-AFRICAN AMERICAN: 30 ML/MIN
GFR SERPL CREATININE-BSD FRML MDRD: ABNORMAL ML/MIN/{1.73_M2}
GFR SERPL CREATININE-BSD FRML MDRD: ABNORMAL ML/MIN/{1.73_M2}
GLUCOSE BLD-MCNC: 97 MG/DL (ref 70–99)
HCT VFR BLD CALC: 26.9 % (ref 36–46)
HEMOGLOBIN: 8.6 G/DL (ref 12–16)
IMMATURE GRANULOCYTES: ABNORMAL %
LYMPHOCYTES # BLD: 9 % (ref 24–44)
Lab: ABNORMAL
MCH RBC QN AUTO: 30.2 PG (ref 26–34)
MCHC RBC AUTO-ENTMCNC: 32.1 G/DL (ref 31–37)
MCV RBC AUTO: 94.3 FL (ref 80–100)
MONOCYTES # BLD: 11 % (ref 1–7)
NRBC AUTOMATED: ABNORMAL PER 100 WBC
PDW BLD-RTO: 14.7 % (ref 11.5–14.9)
PLATELET # BLD: 362 K/UL (ref 150–450)
PLATELET ESTIMATE: ABNORMAL
PMV BLD AUTO: 6 FL (ref 6–12)
POTASSIUM SERPL-SCNC: 4.8 MMOL/L (ref 3.7–5.3)
RBC # BLD: 2.85 M/UL (ref 4–5.2)
RBC # BLD: ABNORMAL 10*6/UL
SEG NEUTROPHILS: 77 % (ref 36–66)
SEGMENTED NEUTROPHILS ABSOLUTE COUNT: 6.4 K/UL (ref 1.3–9.1)
SODIUM BLD-SCNC: 135 MMOL/L (ref 135–144)
SPECIMEN DESCRIPTION: ABNORMAL
TOTAL PROTEIN: 6.3 G/DL (ref 6.4–8.3)
WBC # BLD: 8.4 K/UL (ref 3.5–11)
WBC # BLD: ABNORMAL 10*3/UL

## 2021-04-01 PROCEDURE — 2060000000 HC ICU INTERMEDIATE R&B

## 2021-04-01 PROCEDURE — 85025 COMPLETE CBC W/AUTO DIFF WBC: CPT

## 2021-04-01 PROCEDURE — 6370000000 HC RX 637 (ALT 250 FOR IP): Performed by: FAMILY MEDICINE

## 2021-04-01 PROCEDURE — 36415 COLL VENOUS BLD VENIPUNCTURE: CPT

## 2021-04-01 PROCEDURE — 80053 COMPREHEN METABOLIC PANEL: CPT

## 2021-04-01 PROCEDURE — 99222 1ST HOSP IP/OBS MODERATE 55: CPT | Performed by: INTERNAL MEDICINE

## 2021-04-01 PROCEDURE — 2580000003 HC RX 258: Performed by: INTERNAL MEDICINE

## 2021-04-01 PROCEDURE — 97161 PT EVAL LOW COMPLEX 20 MIN: CPT

## 2021-04-01 PROCEDURE — 6360000002 HC RX W HCPCS: Performed by: INTERNAL MEDICINE

## 2021-04-01 PROCEDURE — 6360000002 HC RX W HCPCS: Performed by: FAMILY MEDICINE

## 2021-04-01 PROCEDURE — 2580000003 HC RX 258: Performed by: FAMILY MEDICINE

## 2021-04-01 RX ORDER — LISINOPRIL 5 MG/1
2.5 TABLET ORAL DAILY
Status: DISCONTINUED | OUTPATIENT
Start: 2021-04-02 | End: 2021-04-02 | Stop reason: HOSPADM

## 2021-04-01 RX ORDER — MIDODRINE HYDROCHLORIDE 2.5 MG/1
2.5 TABLET ORAL
Status: DISCONTINUED | OUTPATIENT
Start: 2021-04-01 | End: 2021-04-02 | Stop reason: HOSPADM

## 2021-04-01 RX ORDER — 0.9 % SODIUM CHLORIDE 0.9 %
1000 INTRAVENOUS SOLUTION INTRAVENOUS ONCE
Status: COMPLETED | OUTPATIENT
Start: 2021-04-01 | End: 2021-04-01

## 2021-04-01 RX ORDER — SODIUM CHLORIDE 9 MG/ML
INJECTION, SOLUTION INTRAVENOUS CONTINUOUS
Status: DISCONTINUED | OUTPATIENT
Start: 2021-04-01 | End: 2021-04-02 | Stop reason: HOSPADM

## 2021-04-01 RX ORDER — FAMOTIDINE 20 MG/1
10 TABLET, FILM COATED ORAL DAILY
Status: DISCONTINUED | OUTPATIENT
Start: 2021-04-02 | End: 2021-04-02 | Stop reason: HOSPADM

## 2021-04-01 RX ADMIN — MIDODRINE HYDROCHLORIDE 2.5 MG: 2.5 TABLET ORAL at 16:21

## 2021-04-01 RX ADMIN — SODIUM CHLORIDE: 9 INJECTION, SOLUTION INTRAVENOUS at 04:39

## 2021-04-01 RX ADMIN — SODIUM CHLORIDE: 9 INJECTION, SOLUTION INTRAVENOUS at 23:32

## 2021-04-01 RX ADMIN — MIDODRINE HYDROCHLORIDE 2.5 MG: 2.5 TABLET ORAL at 12:41

## 2021-04-01 RX ADMIN — CLOPIDOGREL BISULFATE 75 MG: 75 TABLET ORAL at 08:45

## 2021-04-01 RX ADMIN — MAGNESIUM HYDROXIDE 30 ML: 400 SUSPENSION ORAL at 21:32

## 2021-04-01 RX ADMIN — MEROPENEM 500 MG: 500 INJECTION, POWDER, FOR SOLUTION INTRAVENOUS at 13:31

## 2021-04-01 RX ADMIN — PRAVASTATIN SODIUM 80 MG: 40 TABLET ORAL at 21:29

## 2021-04-01 RX ADMIN — LEVOTHYROXINE SODIUM 75 MCG: 75 TABLET ORAL at 08:45

## 2021-04-01 RX ADMIN — LATANOPROST 1 DROP: 50 SOLUTION OPHTHALMIC at 21:30

## 2021-04-01 RX ADMIN — MEROPENEM 500 MG: 500 INJECTION, POWDER, FOR SOLUTION INTRAVENOUS at 01:29

## 2021-04-01 RX ADMIN — HYDROCODONE BITARTRATE AND ACETAMINOPHEN 1 TABLET: 5; 325 TABLET ORAL at 21:29

## 2021-04-01 RX ADMIN — ASPIRIN 81 MG: 81 TABLET, CHEWABLE ORAL at 08:45

## 2021-04-01 RX ADMIN — Medication 1000 MCG: at 08:44

## 2021-04-01 RX ADMIN — FERROUS SULFATE TAB 325 MG (65 MG ELEMENTAL FE) 325 MG: 325 (65 FE) TAB at 08:45

## 2021-04-01 RX ADMIN — ENOXAPARIN SODIUM 30 MG: 30 INJECTION SUBCUTANEOUS at 08:46

## 2021-04-01 RX ADMIN — FAMOTIDINE 20 MG: 20 TABLET ORAL at 08:45

## 2021-04-01 RX ADMIN — SODIUM CHLORIDE 1000 ML: 9 INJECTION, SOLUTION INTRAVENOUS at 02:10

## 2021-04-01 RX ADMIN — FERROUS SULFATE TAB 325 MG (65 MG ELEMENTAL FE) 325 MG: 325 (65 FE) TAB at 16:21

## 2021-04-01 RX ADMIN — HYDROCODONE BITARTRATE AND ACETAMINOPHEN 1 TABLET: 5; 325 TABLET ORAL at 09:59

## 2021-04-01 ASSESSMENT — ENCOUNTER SYMPTOMS
NAUSEA: 0
EYE REDNESS: 0
TROUBLE SWALLOWING: 0
BACK PAIN: 0
SHORTNESS OF BREATH: 0
VOMITING: 0
COUGH: 0
ABDOMINAL PAIN: 1
BLOOD IN STOOL: 0
CHEST TIGHTNESS: 0
EYE ITCHING: 0
ABDOMINAL PAIN: 0
COLOR CHANGE: 0

## 2021-04-01 ASSESSMENT — PAIN DESCRIPTION - PAIN TYPE: TYPE: ACUTE PAIN

## 2021-04-01 ASSESSMENT — PAIN SCALES - GENERAL
PAINLEVEL_OUTOF10: 0
PAINLEVEL_OUTOF10: 6
PAINLEVEL_OUTOF10: 7
PAINLEVEL_OUTOF10: 3

## 2021-04-01 ASSESSMENT — PAIN DESCRIPTION - ONSET
ONSET: ON-GOING
ONSET: ON-GOING

## 2021-04-01 ASSESSMENT — PAIN DESCRIPTION - FREQUENCY
FREQUENCY: CONTINUOUS
FREQUENCY: CONTINUOUS

## 2021-04-01 ASSESSMENT — PAIN DESCRIPTION - DESCRIPTORS: DESCRIPTORS: SHARP

## 2021-04-01 ASSESSMENT — PAIN DESCRIPTION - LOCATION: LOCATION: VAGINA

## 2021-04-01 NOTE — PLAN OF CARE
Problem: Falls - Risk of:  Goal: Will remain free from falls  Description: Will remain free from falls  4/1/2021 1540 by Alla Rodriguez RN  Outcome: Ongoing  Note: Pt remains free from falls this shift. Bed is locked, in lowest position, and call light within reach. 4/1/2021 0240 by Esha Moore RN  Outcome: Met This Shift  Goal: Absence of physical injury  Description: Absence of physical injury  4/1/2021 1540 by Alla Rodriguez RN  Outcome: Ongoing  4/1/2021 0240 by Esha Moore RN  Outcome: Met This Shift     Problem: Skin Integrity:  Goal: Will show no infection signs and symptoms  Description: Will show no infection signs and symptoms  4/1/2021 1540 by Alla Rodriguez RN  Outcome: Ongoing  Note: Skin assessment complete. Area kept free from moisture. Proper nourishment and fluids encouraged, as appropriate. Skin remains clean, dry, and intact. Will continue to monitor for additional needs and changes in skin breakdown. 4/1/2021 0240 by Esha Moore RN  Outcome: Ongoing  Goal: Absence of new skin breakdown  Description: Absence of new skin breakdown  4/1/2021 1540 by Alla Rodriguez RN  Outcome: Ongoing  4/1/2021 0240 by Esha Moore RN  Outcome: Ongoing     Problem: Pain:  Goal: Pain level will decrease  Description: Pain level will decrease  4/1/2021 1540 by Alla Rodriguez RN  Outcome: Ongoing  Note: Pt having complaints of vaginal pain. Given PRN pain medications when requested.    4/1/2021 0240 by Esha Moore RN  Outcome: Ongoing  Goal: Control of acute pain  Description: Control of acute pain  4/1/2021 1540 by Alla Rodriguez RN  Outcome: Ongoing  4/1/2021 0240 by Esha Moore RN  Outcome: Ongoing  Goal: Control of chronic pain  Description: Control of chronic pain  4/1/2021 1540 by Alla Rodriguez RN  Outcome: Ongoing  4/1/2021 0240 by Esha Moore RN  Outcome: Ongoing

## 2021-04-01 NOTE — DISCHARGE INSTR - COC
Continuity of Care Form    Patient Name: Charleen Gandara   :  1935  MRN:  [id number]    Admit date:  3/31/2021  Discharge date:  ***    Code Status Order: Full Code   Advance Directives:   Advance Care Flowsheet Documentation       Date/Time Healthcare Directive Type of Healthcare Directive Copy in [address] Agent's Name Healthcare Agent's Phone Number    21 1800  Yes, patient has an advance directive for healthcare treatment Pt stated that it was updated and she has copy at home. Durable power of  for health care;Living will  Other (Comment) Patient  stated she has copy at home. -- -- --    21 1411  No, patient does not have an advance directive for healthcare treatment  --  -- -- -- --            Admitting Physician:  Precious Coto MD  PCP: Ellie Yoder    Discharging Nurse: [facility] Unit/Room#: 2084/2084-01  Discharging Unit Phone Number: ***    Emergency Contact:   Extended Emergency Contact Information  Primary Emergency Contact: Angella Lassiter 33 Phone: [phone]  Mobile Phone: [phone]  Relation: Niece/Nephew  Secondary Emergency Contact: Abebe Cross  Address: [address] [place] [address] Phone: [phone]  Relation: Spouse    Past Surgical History:  Past Surgical History:   Procedure Laterality Date    ANGIOPLASTY      BLADDER SUSPENSION      CATARACT REMOVAL WITH IMPLANT Bilateral     TOTAL KNEE ARTHROPLASTY Right        Immunization History: There is no immunization history on file for this patient.     Active Problems:  Patient Active Problem List   Diagnosis Code    ST elevation myocardial infarction (STEMI) ([facility] [address] I21.3    Hyperlipidemia E78.5    Hypertension I10    ASCVD (arteriosclerotic cardiovascular disease) I25.10    Presence of cardiac and vascular implant and graft Z95.9    Other specified hypothyroidism E03.8    Gastroesophageal reflux disease without esophagitis K21.9 Vitamin B12 deficiency E53.8    Cystitis N30.90    UTI due to extended-spectrum beta lactamase (ESBL) producing Escherichia coli N39.0, B96.29, Z16.12    Acute kidney injury superimposed on CKD (HCC) N17.9, P41.5    Neutrophilic leukocytosis C82.4    Anemia D64.9    Mixed incontinence N39.46    Cobalamin deficiency E53.8    COVID-19 virus not detected Z03.818    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 coli infection Z86.19    Acute infective cystitis N30.00       Isolation/Infection:   Isolation            Contact          Patient Infection Status       Infection Onset Added Last Indicated Last Indicated By Review Planned Expiration Resolved Resolved By    MDRO (multi-drug resistant organism)  20 Nils Plan, RN        Ecoli- urine 2021    ESBL (Extended Spectrum Beta Lactamase)  07/06/15 02/18/21 Culture, Urine        2021 E.  Coli urine    Resolved    COVID-19 Rule Out 20 COVID-19 (Ordered)   20 Rule-Out Test Resulted            Nurse Assessment:  Last Vital Signs: BP (!) 117/57   Pulse 78   Temp 98.6 °F (37 °C) (Oral)   Resp 16   Ht 4' 11\" (1.499 m)   Wt 137 lb 12.6 oz (62.5 kg)   SpO2 96%   BMI 27.83 kg/m²     Last documented pain score (0-10 scale): Pain Level: 3  Last Weight:   Wt Readings from Last 1 Encounters:   21 137 lb 12.6 oz (62.5 kg)     Mental Status:  {IP PT MENTAL STATUS:}    IV Access:  {[facility] IV ACCESS:502011111}    Nursing Mobility/ADLs:  Walking   {CHP DME FIAO:426121287}  Transfer  {CHP DME OBTA:235028722}  Bathing  {CHP DME UPOX:893679913}  Dressing  {CHP DME PQTT:088962476}  Toileting  {CHP DME MYFC:199258660}  Feeding  {CHP DME LDE}  Med Admin  {[facility] BNPM:690095211}  Med Delivery   {[facility] MED Delivery:666782894}    Wound Care Documentation and Therapy:        Elimination:  Continence: Bowel: {YES / VV:05245}  Bladder: {YES / CQ:29905}  Urinary Catheter: {Urinary Catheter:590416446}   Colostomy/Ileostomy/Ileal Conduit: {YES / VL:30572}       Date of Last BM: ***    Intake/Output Summary (Last 24 hours) at 4/1/2021 1115  Last data filed at 4/1/2021 0448  Gross per 24 hour   Intake 2320 ml   Output 3 ml   Net 2317 ml     I/O last 3 completed shifts: In: 2320 [P.O.:320; I.V.:2000]  Out: 3 [Urine:3]    Safety Concerns:     [address] Safety Concerns:106211904}    Impairments/Disabilities:      [address] Impairments/Disabilities:272063689}    Nutrition Therapy:  Current Nutrition Therapy:   [address] Diet List:488683725}    Routes of Feeding: {CHP DME Other Feedings:012442094}  Liquids: {Slp liquid thickness:58522}  Daily Fluid Restriction: {CHP DME Yes amt example:753057656}  Last Modified Barium Swallow with Video (Video Swallowing Test): {Done Not Done YJTS:685840272}    Treatments at the Time of Hospital Discharge:   Respiratory Treatments: ***  Oxygen Therapy:  {Therapy; copd oxygen:32801}  Ventilator:    {MH CC Vent ZCPH:780887763}    Rehab Therapies: n/a  Weight Bearing Status/Restrictions: No weight bearing restirctions  Other Medical Equipment (for information only, NOT a DME order):     Other Treatments: skilled nursing assessment, medication education and monitorin    Patient's personal belongings (please select all that are sent with patient):  {CHP DME Belongings:199973057}    RN SIGNATURE:  {Esignature:199120049}    CASE MANAGEMENT/SOCIAL WORK SECTION    Inpatient Status Date: ***    Readmission Risk Assessment Score:  Readmission Risk              Risk of Unplanned Readmission:        21           Discharging to [facility]: [phone]  F: [facility] [address]  P:  [phone]  F:  [phone]    Case Manager/ signature: Electronically signed by Jamila Bustos RN on 4/1/21 at 11:15 AM EDT    PHYSICIAN SECTION    Prognosis: Fair    Condition at Discharge: Stable    Rehab Potential (if transferring to Rehab): Fair    Recommended Labs or Other Treatments After Discharge:     Physician Certification: I certify the above information and transfer of Jo Homans  is necessary for the continuing treatment of the diagnosis listed and that she requires Home Care for less 30 days.      Update Admission H&P: No change in H&P    PHYSICIAN SIGNATURE:  Electronically signed by Taryn Ennis MD on 4/2/21 at 11:34 AM EDT

## 2021-04-01 NOTE — PLAN OF CARE
Problem: Falls - Risk of:  Goal: Will remain free from falls  Description: Will remain free from falls  4/1/2021 0240 by Reyes Sommer RN  Outcome: Met This Shift     Problem: Falls - Risk of:  Goal: Absence of physical injury  Description: Absence of physical injury  4/1/2021 0240 by Reyes Sommer RN  Outcome: Met This Shift     Problem: Skin Integrity:  Goal: Will show no infection signs and symptoms  Description: Will show no infection signs and symptoms  4/1/2021 0240 by Reyes Sommer RN  Outcome: Ongoing     Problem: Skin Integrity:  Goal: Absence of new skin breakdown  Description: Absence of new skin breakdown  4/1/2021 0240 by Reyes Sommer RN  Outcome: Ongoing     Problem: Pain:  Goal: Pain level will decrease  Description: Pain level will decrease  4/1/2021 0240 by Reyes Sommer RN  Outcome: Ongoing     Problem: Pain:  Goal: Control of acute pain  Description: Control of acute pain  4/1/2021 0240 by Reyes Sommer RN  Outcome: Ongoing     Problem: Pain:  Goal: Control of chronic pain  Description: Control of chronic pain  4/1/2021 0240 by Reyes Sommer RN  Outcome: Ongoing

## 2021-04-01 NOTE — CONSULTS
[facility] -   Infectious diseases evaluation  admission date 3/31/2021    reason for consultation:   UTI    Impression :   Current:  Recurrent UTI failed outpatient treatment  History of ESBL producing E. coli  Urinary retention  T2 urothelial cell carcinoma status post  tumor resection 8/24/2020 initially followed by  5 cycles of fu /mitomycin and radiation that was completed. Status postop transurethral resection of residual bladder tumor 12/30/2020  History of DVT on anticoagulation  Coronary artery disease  Hypertension  Hyperlipidemia       Recommendations   · IV meropenem  · Follow urine culture and adjust antibiotics as needed  · Follow CBC renal function  · Further commendations as per hospital course        History of Present Illness:   Initial history:  Aayush Arriaga is a 80y.o.-year-old female presented to hospital with dysuria, suprapubic pain and bladder spasms for 10 days, no aggravating or alleviating factors, no fever chills. Urine culture 3/16/2021 grew ESBL producing E. Coli. She was treated with Macrobid as outpatient with no improvement and was admitted for IV antibiotics  Urine culture 1/12/2021 grew ESBL producing E. Coli  She was treated with Macrobid and fosfomycin as outpatient with no improvement . The patient had T2 urothelial cell carcinoma status post  tumor resection 8/24/2020 initially followed by  5 cycles of fu /mitomycin and radiation that was completed. Status postop transurethral resection of residual bladder tumor 12/30/2020   Urine culture 12/21/2020 grew  E. coli that was sensitive to ampicillin/ sulbactam and Klebsiella ([address] was detected)  She was treated with Unasyn during her hospitalization, discharged on Augmentin.   History of left lower extremity DVT, coronary artery disease, hypertension, hyperlipidemia, UTIs with history of ESBL, chronic renal disease, hydronephrosis. Prior bladder suspension surgery in 1970's  Interval changes  4/1/2021   Patient Vitals for the past 8 hrs:   BP Temp Temp src Pulse Resp SpO2   04/01/21 1330 (!) 96/57 -- -- 90 -- --   04/01/21 1215 (!) 83/55 97.5 °F (36.4 °C) Oral 88 16 97 %   04/01/21 0730 (!) 117/57 98.6 °F (37 °C) Oral 78 16 96 %           I have personally reviewed the past medical history, past surgical history, medications, social history, and family history, and I haveupdated the database accordingly. Allergies:   Bactrim [sulfamethoxazole-trimethoprim], Gentamycin [gentamicin], and Sulfamethoxazole-trimethoprim     Review of Systems:     Review of Systems  As per history of present illness, other than above 12 system review was negative  Physical Examination :       Physical Exam  Constitutional:       General: She is not in acute distress. HENT:      Head: Normocephalic and atraumatic. Right Ear: External ear normal.      Left Ear: External ear normal.      Mouth/Throat:      Pharynx: Oropharynx is clear. Eyes:      General: No scleral icterus. Conjunctiva/sclera: Conjunctivae normal.   Neck:      Musculoskeletal: Neck supple. No neck rigidity. Cardiovascular:      Rate and Rhythm: Normal rate and regular rhythm. Heart sounds: No murmur. Pulmonary:      Effort: Pulmonary effort is normal. No respiratory distress. Abdominal:      General: Abdomen is flat. There is no distension. Palpations: Abdomen is soft. Musculoskeletal:      Right lower leg: No edema. Left lower leg: No edema. Skin:     General: Skin is warm. Coloration: Skin is not jaundiced. Neurological:      General: No focal deficit present. Mental Status: She is alert and oriented to person, place, and time.    Psychiatric:         Mood and Affect: Mood normal.         Behavior: Behavior normal.         Past Medical History:     Past Medical History:   Diagnosis Date    Cancer ([facility] [address]     bladder, finished tx on file     Gets together: Not on file     Attends Religion service: Not on file     Active member of club or organization: Not on file     Attends meetings of clubs or organizations: Not on file     Relationship status: Not on file    Intimate partner violence     Fear of current or ex partner: Not on file     Emotionally abused: Not on file     Physically abused: Not on file     Forced sexual activity: Not on file   Other Topics Concern    Not on file   Social History Narrative    Not on file       Family History:     Family History   Problem Relation Age of Onset    Diabetes Brother     Stroke Father     Coronary Art Dis Other         siblings X3      Medical Decision Making:   I have independently reviewed/ordered the following labs:    CBC with Differential:   Recent Labs     03/31/21  1305 04/01/21  0546   WBC 11.6* 8.4   HGB 9.8* 8.6*   HCT 29.0* 26.9*    362   LYMPHOPCT 11* 9*   MONOPCT 14* 11*     BMP:  Recent Labs     03/31/21  1305 04/01/21  0546   * 135   K 5.4* 4.8   CL 97* 105   CO2 25 22   BUN 20 18   CREATININE 1.73* 1.64*     Hepatic Function Panel:   Recent Labs     03/31/21  1305 04/01/21  0546   PROT 7.8 6.3*   LABALBU 3.4* 2.4*   BILITOT 0.16* <0.15*   ALKPHOS 103 81   ALT <5* 5   AST 13 12     No results for input(s): RPR in the last 72 hours. No results for input(s): HIV in the last 72 hours. No results for input(s): BC in the last 72 hours. Lab Results   Component Value Date    CREATININE 1.64 04/01/2021    GLUCOSE 97 04/01/2021       Detailed results: Thank you for allowing us to participate in the care of this patient. Please call with questions. This note is created with the assistance of a speech recognition program.  While intending to generate adocument that actually reflects the content of the visit, the document can still have some errors including those of syntax and sound a like substitutions which may escape proof reading.   It such instances, actual meaningcan be extrapolated by contextual diversion.     Tristan Duran MD  Office: [phone]  Perfect serve / office [phone]

## 2021-04-01 NOTE — PROGRESS NOTES
RN spoke to Dr. Tank Avelar about pts dosing of lisinopril. Dr. Tank Avelar stated that she would like this mornings dose of lisinopril held because of pts low BP overnight but change order to 2.5mg daily.

## 2021-04-01 NOTE — PROGRESS NOTES
Physical Therapy    DATE: 2021    NAME: Charleen Gandara  MRN: [id number]   : 1935      Patient not seen this date for Physical Therapy due to:    Patient independent with functional mobility with no acute PT needs. Will defer PT evaluation at this time. Please reorder PT if future needs arise.        Electronically signed by Serenity Olivier PT on 2021 at 11:02 AM

## 2021-04-01 NOTE — PROGRESS NOTES
RN administering pts morning medications. Pt stated that her lisinopril dose was changed to 2.5mg and not 5mg anymore. RN paging Dr. Clovis Mcmillan to get order changed.

## 2021-04-01 NOTE — CARE COORDINATION
Write received a call from Taylor at [address] [place] [address] cost for [facility] is $545.00 per week, this includes drug and supplies.     Electronically signed by Addis Willoughby RN on 4/1/2021 at 2:19 PM

## 2021-04-01 NOTE — FLOWSHEET NOTE
04/01/21 0130   Vital Signs   Temp 98.8 °F (37.1 °C)   Temp Source Oral   Pulse 79   Heart Rate Source Monitor   Resp 18   BP (!) 88/58  (manual)   BP Location Right upper arm   Level of Consciousness Alert (0)   MEWS Score 2   Oxygen Therapy   SpO2 98 %   O2 Device None (Room air)     Writer notified Dr. Vinay Mcmullen that patient's blood pressure was 88/58 manually at this time. Received orders for 1000mL bolus over 2 hours and to start NS at 75ml/hr  After bolus is completed. Orders entered at this time.

## 2021-04-01 NOTE — PROGRESS NOTES
Progress Note    4/1/2021   12:13 PM    Name:  Savana Kruger  MRN:    [id number]     Kenzie:     [de-identified]   Room:  2084/2084-01   Day: 1     Admit Date: 3/31/2021 11:18 AM  PCP: Yessenia Shankar    Subjective:     C/C:   Chief Complaint   Patient presents with    Vaginal Pain     Has bladder spasms when standing. Fine if sitting. Interval History: Status: not changed. Patient had low blood pressure yesterday received 1 L of normal saline bolus. Blood pressure and heart rate stable today. Recent labs reviewed potassium 4.8, mean 1.64, hemoglobin 8.6. Urine culture positive blood culture pending    ROS:   all 10 systems reviewed and are negative except as noted    Review of Systems   Constitutional: Negative for chills and fatigue. HENT: Negative for drooling, mouth sores, sneezing and trouble swallowing. Eyes: Negative for redness and itching. Respiratory: Negative for cough, chest tightness and shortness of breath. Cardiovascular: Negative for chest pain, palpitations and leg swelling. Gastrointestinal: Negative for abdominal pain, blood in stool, nausea and vomiting. Endocrine: Negative for heat intolerance and polyphagia. Genitourinary: Negative for difficulty urinating, flank pain and pelvic pain. Musculoskeletal: Negative for arthralgias, joint swelling and neck stiffness. Skin: Negative for color change and pallor. Allergic/Immunologic: Negative for food allergies. Neurological: Negative for dizziness, seizures and headaches. Hematological: Does not bruise/bleed easily. Psychiatric/Behavioral: Negative for agitation, behavioral problems and suicidal ideas. The patient is not hyperactive. Medications: Allergies:    Allergies   Allergen Reactions    Bactrim [Sulfamethoxazole-Trimethoprim] Hives    Gentamycin [Gentamicin]     Sulfamethoxazole-Trimethoprim        Current Meds: 0.9 % sodium chloride infusion, Continuous  [START ON 4/2/2021] famotidine (PEPCID) Substance and Sexual Activity    Alcohol use: No    Drug use: No    Sexual activity: Not on file   Lifestyle    Physical activity     Days per week: Not on file     Minutes per session: Not on file    Stress: Not on file   Relationships    Social connections     Talks on phone: Not on file     Gets together: Not on file     Attends [employer] service: Not on file     Active member of club or organization: Not on file     Attends meetings of clubs or organizations: Not on file     Relationship status: Not on file    Intimate partner violence     Fear of current or ex partner: Not on file     Emotionally abused: Not on file     Physically abused: Not on file     Forced sexual activity: Not on file   Other Topics Concern    Not on file   Social History Narrative    Not on file       I/O (24Hr): Intake/Output Summary (Last 24 hours) at 4/1/2021 1213  Last data filed at 4/1/2021 0448  Gross per 24 hour   Intake 2320 ml   Output 3 ml   Net 2317 ml     Radiology:  No results found.     Labs:  Recent Results (from the past 24 hour(s))   CBC with DIFF    Collection Time: 03/31/21  1:05 PM   Result Value Ref Range    WBC 11.6 (H) 3.5 - 11.0 k/uL    RBC 3.16 (L) 4.0 - 5.2 m/uL    Hemoglobin 9.8 (L) 12.0 - 16.0 g/dL    Hematocrit 29.0 (L) 36 - 46 %    MCV 91.9 80 - 100 fL    MCH 30.9 26 - 34 pg    MCHC 33.6 31 - 37 g/dL    RDW 14.8 11.5 - 14.9 %    Platelets 259 625 - 997 k/uL    MPV 5.9 (L) 6.0 - 12.0 fL    NRBC Automated NOT REPORTED per 100 WBC    Differential Type NOT REPORTED     Immature Granulocytes NOT REPORTED 0 %    Absolute Immature Granulocyte NOT REPORTED 0.00 - 0.30 k/uL    WBC Morphology NOT REPORTED     RBC Morphology NOT REPORTED     Platelet Estimate NOT REPORTED     Seg Neutrophils 74 (H) 36 - 66 %    Lymphocytes 11 (L) 24 - 44 %    Monocytes 14 (H) 1 - 7 %    Eosinophils % 1 0 - 4 %    Basophils 0 0 - 2 %    Segs Absolute 8.58 1.3 - 9.1 k/uL    Absolute Lymph # 1.28 1.0 - 4.8 k/uL    Absolute Albumin 2.4 (L) 3.5 - 5.2 g/dL    Albumin/Globulin Ratio NOT REPORTED 1.0 - 2.5    GFR Non-African American 30 (L) >60 mL/min    GFR  36 (L) >60 mL/min    GFR Comment          GFR Staging NOT REPORTED    CBC auto differential    Collection Time: 21  5:46 AM   Result Value Ref Range    WBC 8.4 3.5 - 11.0 k/uL    RBC 2.85 (L) 4.0 - 5.2 m/uL    Hemoglobin 8.6 (L) 12.0 - 16.0 g/dL    Hematocrit 26.9 (L) 36 - 46 %    MCV 94.3 80 - 100 fL    MCH 30.2 26 - 34 pg    MCHC 32.1 31 - 37 g/dL    RDW 14.7 11.5 - 14.9 %    Platelets 826 145 - 917 k/uL    MPV 6.0 6.0 - 12.0 fL    NRBC Automated NOT REPORTED per 100 WBC    Differential Type NOT REPORTED     Seg Neutrophils 77 (H) 36 - 66 %    Lymphocytes 9 (L) 24 - 44 %    Monocytes 11 (H) 1 - 7 %    Eosinophils % 2 0 - 4 %    Basophils 1 0 - 2 %    Immature Granulocytes NOT REPORTED 0 %    Segs Absolute 6.40 1.3 - 9.1 k/uL    Absolute Lymph # 0.70 (L) 1.0 - 4.8 k/uL    Absolute Mono # 0.90 0.1 - 1.3 k/uL    Absolute Eos # 0.20 0.0 - 0.4 k/uL    Basophils Absolute 0.10 0.0 - 0.2 k/uL    Absolute Immature Granulocyte NOT REPORTED 0.00 - 0.30 k/uL    WBC Morphology NOT REPORTED     RBC Morphology NOT REPORTED     Platelet Estimate NOT REPORTED        Physical Examination:        Vitals:  BP (!) 117/57   Pulse 78   Temp 98.6 °F (37 °C) (Oral)   Resp 16   Ht 4' 11\" (1.499 m)   Wt 137 lb 12.6 oz (62.5 kg)   SpO2 96%   BMI 27.83 kg/m²   Temp (24hrs), Av.4 °F (36.9 °C), Min:97.7 °F (36.5 °C), Max:98.8 °F (37.1 °C)    No results for input(s): POCGLU in the last 72 hours. Physical Exam  Vitals signs reviewed. HENT:      Head: Normocephalic. Right Ear: External ear normal.      Left Ear: External ear normal.      Nose: Nose normal.      Mouth/Throat:      Mouth: Mucous membranes are moist.      Pharynx: Oropharynx is clear. Eyes:      Conjunctiva/sclera: Conjunctivae normal.   Neck:      Musculoskeletal: Normal range of motion and neck supple. Cardiovascular:      Rate and Rhythm: Normal rate and regular rhythm. Pulses: Normal pulses. Heart sounds: Normal heart sounds. Pulmonary:      Effort: Pulmonary effort is normal.      Breath sounds: Normal breath sounds. Abdominal:      General: Bowel sounds are normal.      Palpations: Abdomen is soft. Tenderness: There is no abdominal tenderness. There is no right CVA tenderness or left CVA tenderness. Musculoskeletal:         General: No deformity. Skin:     General: Skin is warm. Capillary Refill: Capillary refill takes less than 2 seconds. Coloration: Skin is not jaundiced. Neurological:      General: No focal deficit present. Mental Status: She is alert. Mental status is at baseline. Psychiatric:         Mood and Affect: Mood normal.         Behavior: Behavior normal.         Assessment:        Primary Problem  Acute cystitis     Principal Problem:    Acute cystitis  Active Problems:    ASCVD (arteriosclerotic cardiovascular disease)    Other specified hypothyroidism    Gastroesophageal reflux disease without esophagitis    Acute kidney injury superimposed on CKD (HCC)    Iron deficiency anemia    Stage 3 chronic kidney disease    History of ESBL E. coli infection    Acute infective cystitis  Resolved Problems:    * No resolved hospital problems. *      Past Medical History:   Diagnosis Date    Cancer [facility])     bladder, finished tx 12/10/2020    Coronary artery disease     ESBL (extended spectrum beta-lactamase) producing bacteria infection 6/30/2015    E. Coli urine    HH (hiatus hernia)     Hyperkalemia 4/29/2020    Hyperlipidemia     Hypertension     Hypotension 4/29/2020    Thyroid disease         Plan:        1. IV meropenem  1. Urine culture positive  2. Start midodrine 2.5 mg p.o. 3 times a day  3. Urine culture sensitivities pending  4. Hold lisinopril  5. Monitor CMP  6. Follow blood culture  7.  DVT Prophylaxis Lovenox 30 mg subcu daily  8. EPCs  9. PT/OT to evaluate and treat  10. Pain control  11. Replace electrolytes as per sliding scale  12. Home medications reviewed and appropriate medications continued  13.  Reviewed labs and imaging studies from last 24 hours and results explained to patient      Electronically signed by Genella Bosworth, MD

## 2021-04-01 NOTE — ED PROVIDER NOTES
[facility]      Pt Name: Jewel Ochoa  MRN: [id number]  [place] 1935  Date of evaluation: 4/1/21      CHIEF COMPLAINT       Chief Complaint   Patient presents with    Vaginal Pain     Has bladder spasms when standing. Fine if sitting. HISTORY OF PRESENT ILLNESS   HPI 80 y.o. female presents with c/o urinary tract infection. The patient reports that she has been having problems with chronic UTIs. She states that over the last 10 days her UTI has returned. She's been taking antiboitcs without any improvement. Recent urinary culture returned showing ESBL ecoli MDR. Her infectious disease physician recommended she come to the emergency department. She reports that she has severe in severity, constant suprapubic pain and burning with urination. No fevers. No confusion. No flank or cva pain. REVIEW OF SYSTEMS       Review of Systems   Constitutional: Negative for fever. HENT: Negative for congestion. Eyes: Negative for visual disturbance. Respiratory: Negative for cough. Cardiovascular: Negative for chest pain. Gastrointestinal: Positive for abdominal pain. Negative for vomiting. Genitourinary: Positive for dysuria and vaginal pain. Musculoskeletal: Negative for back pain. Skin: Negative for rash. Neurological: Negative for headaches. Psychiatric/Behavioral: Negative for confusion. PAST MEDICAL HISTORY     Past Medical History:   Diagnosis Date    Cancer [facility])     bladder, finished tx 12/10/2020    Coronary artery disease     ESBL (extended spectrum beta-lactamase) producing bacteria infection 6/30/2015    E.  Coli urine    HH (hiatus hernia)     Hyperkalemia 4/29/2020    Hyperlipidemia     Hypertension     Hypotension 4/29/2020    Thyroid disease        SURGICAL HISTORY       Past Surgical History:   Procedure Laterality Date    ANGIOPLASTY      BLADDER SUSPENSION      CATARACT REMOVAL WITH IMPLANT Bilateral     TOTAL KNEE ARTHROPLASTY Right        CURRENT MEDICATIONS       Current Discharge Medication List      CONTINUE these medications which have NOT CHANGED    Details   ferrous sulfate (IRON 325) 325 (65 Fe) MG tablet Take 325 mg by mouth Daily with supper      nitrofurantoin, macrocrystal-monohydrate, (MACROBID) 100 MG capsule Take 1 capsule by mouth 2 times daily for 14 days  Qty: 28 capsule, Refills: 0      vitamin B-12 1000 MCG tablet Take 1 tablet by mouth daily  Qty: 30 tablet, Refills: 3      pravastatin (PRAVACHOL) 80 MG tablet Take 80 mg by mouth daily      HYDROcodone-acetaminophen (NORCO) 5-325 MG per tablet Take 1 tablet by mouth 2 times daily as needed for Pain.      famotidine (PEPCID) 20 MG tablet Take 20 mg by mouth 2 times daily      acetaminophen (TYLENOL) 500 MG tablet Take 1,000 mg by mouth every 6 hours as needed for Pain      aspirin 81 MG chewable tablet Take 1 tablet by mouth daily  Qty: 30 tablet, Refills: 3      clopidogrel (PLAVIX) 75 MG tablet Take 1 tablet by mouth daily  Qty: 30 tablet, Refills: 3      lisinopril (PRINIVIL;ZESTRIL) 5 MG tablet Take 2.5 mg by mouth daily       latanoprost (XALATAN) 0.005 % ophthalmic solution Place 1 drop into both eyes nightly      senna-docusate (PERICOLACE) 8.6-50 MG per tablet Take 1 tablet by mouth daily as needed constipation      levothyroxine (SYNTHROID) 75 MCG tablet Take 75 mcg by mouth daily             ALLERGIES     is allergic to bactrim [sulfamethoxazole-trimethoprim]; gentamycin [gentamicin]; and sulfamethoxazole-trimethoprim. FAMILY HISTORY     She indicated that the status of her father is unknown. She indicated that the status of her brother is unknown. She indicated that the status of her other is unknown. SOCIAL HISTORY      reports that she has never smoked. She has never used smokeless tobacco. She reports that she does not drink alcohol or use drugs.     PHYSICAL EXAM     INITIAL VITALS: BP (!) 117/57   Pulse 78   Temp 98.6 °F (37 °C) (Oral)   Resp 16   Ht 4' 11\" (1.499 m)   Wt 137 lb 12.6 oz (62.5 kg)   SpO2 96%   BMI 27.83 kg/m²   Gen: nad  Head: Normocephalic, atraumatic  Eye: Pupils equal round reactive to light, no conjunctivitis  ENT: MMM  Neck: no JVD  Heart: Regular rate and rhythm no murmurs  Lungs: Clear to auscultation bilaterally, no respiratory distress  Chest wall: No crepitus, no tenderness palpation  Abdomen: Soft, nontender, nondistended, with no peritoneal signs  : Exam chaperoned by female staff attendant. No vulvar lesions. MSK: No cva ttp  Neurologic: Patient is alert and oriented x3, motor and sensation is intact in all 4 extremities, fluent speech  Extremities: no edema    MEDICAL DECISION MAKING:     MDM  80 y.o. female presenting with a multidrug resistent ESBL E.coli UTI. Failed outpatient therapy. I spoke with her primary care provider, will require admission to hospital.   No sign of pyelonephhritis at this time. WBC count is 11.6. Pt has ckd, cr stable from February. K 5.4. Admitting to hospital. D/w Pt and she is in agreement with the plan. DIAGNOSTIC RESULTS     LABS: All lab results were reviewed by myself, and all abnormals are listed below.   Labs Reviewed   CULTURE, URINE - Abnormal; Notable for the following components:       Result Value    Culture ESCHERICHIA COLI >[id number] CFU/ML (*)     All other components within normal limits   URINE RT REFLEX TO CULTURE - Abnormal; Notable for the following components:    Turbidity UA TURBID (*)     Urine Hgb MOD (*)     Protein, UA 2+ (*)     Leukocyte Esterase, Urine MOD (*)     All other components within normal limits   MICROSCOPIC URINALYSIS - Abnormal; Notable for the following components:    Bacteria, UA MANY (*)     Mucus, UA 1+ (*)     Amorphous, UA 1+ (*)     All other components within normal limits   CBC WITH AUTO DIFFERENTIAL - Abnormal; Notable for the following components:    WBC 11.6 (*)     RBC 3.16 (*)     Hemoglobin 9.8 (mini-bag)     Order Specific Question:   Antimicrobial Indications     Answer:   Urinary Tract Infection    phenazopyridine (PYRIDIUM) tablet 200 mg    albuterol (PROVENTIL) nebulizer solution 2.5 mg    aspirin chewable tablet 81 mg    budesonide-formoterol (SYMBICORT) 80-4.5 MCG/ACT inhaler 2 puff    clopidogrel (PLAVIX) tablet 75 mg    DISCONTD: famotidine (PEPCID) tablet 20 mg    ferrous sulfate (IRON 325) tablet 325 mg    HYDROcodone-acetaminophen (NORCO) 5-325 MG per tablet 1 tablet    latanoprost (XALATAN) 0.005 % ophthalmic solution 1 drop    levothyroxine (SYNTHROID) tablet 75 mcg    DISCONTD: lisinopril (PRINIVIL;ZESTRIL) tablet 5 mg    pravastatin (PRAVACHOL) tablet 80 mg    vitamin B-12 (CYANOCOBALAMIN) tablet 1,000 mcg    sodium chloride flush 0.9 % injection 10 mL    sodium chloride flush 0.9 % injection 10 mL    0.9 % sodium chloride infusion    DISCONTD: potassium chloride (KLOR-CON M) extended release tablet 40 mEq    DISCONTD: potassium bicarb-citric acid (EFFER-K) effervescent tablet 40 mEq    DISCONTD: potassium chloride 10 mEq/100 mL IVPB (Peripheral Line)    DISCONTD: magnesium sulfate 1000 mg in dextrose 5% 100 mL IVPB    DISCONTD: enoxaparin (LOVENOX) injection 40 mg    OR Linked Order Group     promethazine (PHENERGAN) tablet 12.5 mg     ondansetron (ZOFRAN) injection 4 mg    magnesium hydroxide (MILK OF MAGNESIA) 400 MG/5ML suspension 30 mL    OR Linked Order Group     acetaminophen (TYLENOL) tablet 650 mg     acetaminophen (TYLENOL) suppository 650 mg    DISCONTD: phenazopyridine (PYRIDIUM) tablet 100 mg    meropenem (MERREM) 500 mg IVPB extended (mini-bag)     Order Specific Question:   Antimicrobial Indications     Answer:   Urinary Tract Infection    enoxaparin (LOVENOX) injection 30 mg    0.9 % sodium chloride bolus    0.9 % sodium chloride infusion    famotidine (PEPCID) tablet 10 mg    lisinopril (PRINIVIL;ZESTRIL) tablet 2.5 mg    midodrine (PROAMATINE) tablet 2.5 mg     -------------------------  CRITICAL CARE:   CONSULTS: IP CONSULT TO INFECTIOUS DISEASES  IP CONSULT TO SPIRITUAL SERVICES  PROCEDURES: Procedures     FINAL IMPRESSION      1. Newly diagnosed infection due to multidrug resistant organism    2. Acute cystitis with hematuria          DISPOSITION/PLAN   DISPOSITION Admitted 03/31/2021 12:38:34 PM      PATIENT REFERRED TO:  No follow-up provider specified.     DISCHARGE MEDICATIONS:  Current Discharge Medication List            Macho Frances MD  Attending Emergency Physician                      Macho Frances MD  04/01/21 [phone]

## 2021-04-01 NOTE — CARE COORDINATION
CASE MANAGEMENT NOTE:    Admission Date:  3/31/2021 Miroslava Joseph is a 80 y.o.  female    Admitted for : Acute infective cystitis [N30.00]  Acute infective cystitis [N30.00]    Met with:  Patient and spouse, Yolanda Landers    PCP:  Dr. Bhavana Mendez:  Medicare and [facility]      Is patient is a : No    Is patient alert and oriented at time of discussion:  Yes    Current Residence/ Living Arrangements:  independently at home with spouse and drives. Current Services PTA:  Follows at [facility] for bladder cancer    Does patient go to outpatient dialysis: No  If yes, location and chair time: n/a    Is patient agreeable to VNS: Yes- only if needs IV atb's at home. Last time she was discharged home on IV Invanz and received it at the [facility] and prefers to do this if possible. Freedom of choice provided:  Yes    List of [address] provided: Yes    VNS chosen:  Carri Vincent. Referral faxed and notified Alexei Miles from [facility] of this. Also faxed referral to [facility] to follow for possible IV Merrem and notified Sylwia Monae from [facility].    DME:  straight cane, walker, grab bars    Home Oxygen: No    Nebulizer: No    CPAP/BIPAP: No    Supplier: N/A    Potential Assistance Needed: Outpatient atbs at [address] [place] [address] with [address] and [facility]    SNF needed: No    Freedom of choice and list provided: NA    Pharmacy:  [facility]       Does Patient want to use MEDS to BEDS? No    Is patient currently receiving oral anticoagulation therapy? No    Is the Patient an [facility] with Readmission Risk Score greater than 14%? No  If yes, pt needs a follow up appointment made within 7 days. Family Members/Caregivers that pt would like involved in their care:    Yes    If yes, list name here:  Bella Mitchell and Yolanda Landers    Transportation Provider:  Patient and Family             Discharge Plan: To be determined.                  Electronically signed by: Dm Stahl RN on 4/1/2021 at 11:12 AM

## 2021-04-02 VITALS
WEIGHT: 143.08 LBS | HEIGHT: 59 IN | DIASTOLIC BLOOD PRESSURE: 43 MMHG | HEART RATE: 83 BPM | BODY MASS INDEX: 28.84 KG/M2 | RESPIRATION RATE: 16 BRPM | SYSTOLIC BLOOD PRESSURE: 102 MMHG | OXYGEN SATURATION: 97 % | TEMPERATURE: 98.1 F

## 2021-04-02 LAB
ABSOLUTE EOS #: 0.2 K/UL (ref 0–0.4)
ABSOLUTE IMMATURE GRANULOCYTE: ABNORMAL K/UL (ref 0–0.3)
ABSOLUTE LYMPH #: 0.8 K/UL (ref 1–4.8)
ABSOLUTE MONO #: 1.1 K/UL (ref 0.1–1.3)
ALBUMIN SERPL-MCNC: 2.4 G/DL (ref 3.5–5.2)
ALBUMIN/GLOBULIN RATIO: ABNORMAL (ref 1–2.5)
ALP BLD-CCNC: 83 U/L (ref 35–104)
ALT SERPL-CCNC: <5 U/L (ref 5–33)
ANION GAP SERPL CALCULATED.3IONS-SCNC: 8 MMOL/L (ref 9–17)
AST SERPL-CCNC: 10 U/L
BASOPHILS # BLD: 1 % (ref 0–2)
BASOPHILS ABSOLUTE: 0.1 K/UL (ref 0–0.2)
BILIRUB SERPL-MCNC: <0.15 MG/DL (ref 0.3–1.2)
BUN BLDV-MCNC: 17 MG/DL (ref 8–23)
BUN/CREAT BLD: ABNORMAL (ref 9–20)
CALCIUM SERPL-MCNC: 8.2 MG/DL (ref 8.6–10.4)
CHLORIDE BLD-SCNC: 103 MMOL/L (ref 98–107)
CO2: 24 MMOL/L (ref 20–31)
CREAT SERPL-MCNC: 1.53 MG/DL (ref 0.5–0.9)
DIFFERENTIAL TYPE: ABNORMAL
EOSINOPHILS RELATIVE PERCENT: 2 % (ref 0–4)
GFR AFRICAN AMERICAN: 39 ML/MIN
GFR NON-AFRICAN AMERICAN: 32 ML/MIN
GFR SERPL CREATININE-BSD FRML MDRD: ABNORMAL ML/MIN/{1.73_M2}
GFR SERPL CREATININE-BSD FRML MDRD: ABNORMAL ML/MIN/{1.73_M2}
GLUCOSE BLD-MCNC: 95 MG/DL (ref 70–99)
HCT VFR BLD CALC: 26.6 % (ref 36–46)
HEMOGLOBIN: 8.7 G/DL (ref 12–16)
IMMATURE GRANULOCYTES: ABNORMAL %
LYMPHOCYTES # BLD: 8 % (ref 24–44)
MCH RBC QN AUTO: 30.7 PG (ref 26–34)
MCHC RBC AUTO-ENTMCNC: 32.7 G/DL (ref 31–37)
MCV RBC AUTO: 93.7 FL (ref 80–100)
MONOCYTES # BLD: 11 % (ref 1–7)
NRBC AUTOMATED: ABNORMAL PER 100 WBC
PDW BLD-RTO: 15 % (ref 11.5–14.9)
PLATELET # BLD: 366 K/UL (ref 150–450)
PLATELET ESTIMATE: ABNORMAL
PMV BLD AUTO: 6.2 FL (ref 6–12)
POTASSIUM SERPL-SCNC: 5.5 MMOL/L (ref 3.7–5.3)
RBC # BLD: 2.84 M/UL (ref 4–5.2)
RBC # BLD: ABNORMAL 10*6/UL
SEG NEUTROPHILS: 78 % (ref 36–66)
SEGMENTED NEUTROPHILS ABSOLUTE COUNT: 7.4 K/UL (ref 1.3–9.1)
SODIUM BLD-SCNC: 135 MMOL/L (ref 135–144)
TOTAL PROTEIN: 6.1 G/DL (ref 6.4–8.3)
WBC # BLD: 9.6 K/UL (ref 3.5–11)
WBC # BLD: ABNORMAL 10*3/UL

## 2021-04-02 PROCEDURE — 85025 COMPLETE CBC W/AUTO DIFF WBC: CPT

## 2021-04-02 PROCEDURE — 36415 COLL VENOUS BLD VENIPUNCTURE: CPT

## 2021-04-02 PROCEDURE — 80053 COMPREHEN METABOLIC PANEL: CPT

## 2021-04-02 PROCEDURE — 6370000000 HC RX 637 (ALT 250 FOR IP): Performed by: FAMILY MEDICINE

## 2021-04-02 PROCEDURE — 99232 SBSQ HOSP IP/OBS MODERATE 35: CPT | Performed by: INTERNAL MEDICINE

## 2021-04-02 PROCEDURE — 6360000002 HC RX W HCPCS: Performed by: INTERNAL MEDICINE

## 2021-04-02 PROCEDURE — 2580000003 HC RX 258: Performed by: INTERNAL MEDICINE

## 2021-04-02 PROCEDURE — 6360000002 HC RX W HCPCS: Performed by: FAMILY MEDICINE

## 2021-04-02 RX ORDER — SODIUM CHLORIDE 9 MG/ML
INJECTION, SOLUTION INTRAVENOUS CONTINUOUS
Status: CANCELLED | OUTPATIENT
Start: 2021-04-03

## 2021-04-02 RX ORDER — AMPICILLIN 500 MG/1
500 CAPSULE ORAL 4 TIMES DAILY
Qty: 28 CAPSULE | Refills: 0 | Status: SHIPPED | OUTPATIENT
Start: 2021-04-02 | End: 2021-04-09

## 2021-04-02 RX ORDER — SODIUM CHLORIDE 0.9 % (FLUSH) 0.9 %
5 SYRINGE (ML) INJECTION PRN
Status: CANCELLED | OUTPATIENT
Start: 2021-04-03

## 2021-04-02 RX ORDER — METHYLPREDNISOLONE SODIUM SUCCINATE 125 MG/2ML
125 INJECTION, POWDER, LYOPHILIZED, FOR SOLUTION INTRAMUSCULAR; INTRAVENOUS ONCE
Status: CANCELLED | OUTPATIENT
Start: 2021-04-03 | End: 2021-04-03

## 2021-04-02 RX ORDER — BUDESONIDE AND FORMOTEROL FUMARATE DIHYDRATE 80; 4.5 UG/1; UG/1
2 AEROSOL RESPIRATORY (INHALATION) 2 TIMES DAILY
Qty: 1 INHALER | Refills: 3 | Status: SHIPPED | OUTPATIENT
Start: 2021-04-02

## 2021-04-02 RX ORDER — SODIUM CHLORIDE 0.9 % (FLUSH) 0.9 %
10 SYRINGE (ML) INJECTION PRN
Status: CANCELLED | OUTPATIENT
Start: 2021-04-03

## 2021-04-02 RX ORDER — HEPARIN SODIUM (PORCINE) LOCK FLUSH IV SOLN 100 UNIT/ML 100 UNIT/ML
500 SOLUTION INTRAVENOUS PRN
Status: CANCELLED | OUTPATIENT
Start: 2021-04-03

## 2021-04-02 RX ORDER — SODIUM POLYSTYRENE SULFONATE 4.1 MEQ/G
15 POWDER, FOR SUSPENSION ORAL; RECTAL ONCE
Status: COMPLETED | OUTPATIENT
Start: 2021-04-02 | End: 2021-04-02

## 2021-04-02 RX ORDER — DIPHENHYDRAMINE HYDROCHLORIDE 50 MG/ML
50 INJECTION INTRAMUSCULAR; INTRAVENOUS ONCE
Status: CANCELLED | OUTPATIENT
Start: 2021-04-03 | End: 2021-04-03

## 2021-04-02 RX ORDER — EPINEPHRINE 1 MG/ML
0.3 INJECTION, SOLUTION, CONCENTRATE INTRAVENOUS PRN
Status: CANCELLED | OUTPATIENT
Start: 2021-04-03

## 2021-04-02 RX ORDER — HEPARIN SODIUM (PORCINE) LOCK FLUSH IV SOLN 100 UNIT/ML 100 UNIT/ML
100 SOLUTION INTRAVENOUS PRN
Status: DISCONTINUED | OUTPATIENT
Start: 2021-04-02 | End: 2021-04-02 | Stop reason: HOSPADM

## 2021-04-02 RX ADMIN — MIDODRINE HYDROCHLORIDE 2.5 MG: 2.5 TABLET ORAL at 12:04

## 2021-04-02 RX ADMIN — FERROUS SULFATE TAB 325 MG (65 MG ELEMENTAL FE) 325 MG: 325 (65 FE) TAB at 07:07

## 2021-04-02 RX ADMIN — CLOPIDOGREL BISULFATE 75 MG: 75 TABLET ORAL at 07:07

## 2021-04-02 RX ADMIN — ASPIRIN 81 MG: 81 TABLET, CHEWABLE ORAL at 07:07

## 2021-04-02 RX ADMIN — MIDODRINE HYDROCHLORIDE 2.5 MG: 2.5 TABLET ORAL at 07:06

## 2021-04-02 RX ADMIN — MEROPENEM 500 MG: 500 INJECTION, POWDER, FOR SOLUTION INTRAVENOUS at 12:22

## 2021-04-02 RX ADMIN — LEVOTHYROXINE SODIUM 75 MCG: 75 TABLET ORAL at 06:34

## 2021-04-02 RX ADMIN — MEROPENEM 500 MG: 500 INJECTION, POWDER, FOR SOLUTION INTRAVENOUS at 00:56

## 2021-04-02 RX ADMIN — Medication 1000 MCG: at 07:07

## 2021-04-02 RX ADMIN — ENOXAPARIN SODIUM 30 MG: 30 INJECTION SUBCUTANEOUS at 07:07

## 2021-04-02 RX ADMIN — FAMOTIDINE 10 MG: 20 TABLET ORAL at 07:07

## 2021-04-02 RX ADMIN — HEPARIN 100 UNITS: 100 SYRINGE at 15:35

## 2021-04-02 RX ADMIN — PRAVASTATIN SODIUM 80 MG: 40 TABLET ORAL at 07:07

## 2021-04-02 RX ADMIN — SODIUM POLYSTYRENE SULFONATE 15 G: 1 POWDER ORAL; RECTAL at 12:22

## 2021-04-02 ASSESSMENT — ENCOUNTER SYMPTOMS
ANAL BLEEDING: 0
EYE PAIN: 0
SORE THROAT: 0
STRIDOR: 0
EYE DISCHARGE: 0
RECTAL PAIN: 0
CHEST TIGHTNESS: 0
APNEA: 0

## 2021-04-02 NOTE — PROGRESS NOTES
Dr. Sandor Felty informed Amberly Martinez that pt can d/c on · Ertapenem 1 g daily until 4/14/2021 and PO ampicillin 500mg q6 hrs for 7 days. Will notify d/c planner.

## 2021-04-02 NOTE — DISCHARGE SUMMARY
Discharge Summary      Patient ID: Charleen Gandara    MRN: [id number]     Acct:  [de-identified]       Patient's PCP: Ian Pae Date: 3/31/2021     Discharge Date: 4/2/2021      Admitting Physician: Precious Coto MD    Discharge Physician: Precious Coto MD     Discharge Diagnoses:    Primary Problem  Acute cystitis    Principal Problem:    Acute cystitis  Active Problems:    ASCVD (arteriosclerotic cardiovascular disease)    Other specified hypothyroidism    Gastroesophageal reflux disease without esophagitis    Acute kidney injury superimposed on CKD ([address]    Iron deficiency anemia    Stage 3 chronic kidney disease    History of ESBL E. coli infection    Acute infective cystitis  Resolved Problems:    * No resolved hospital problems. *    Past Medical History:   Diagnosis Date    Cancer [facility])     bladder, finished tx 12/10/2020    Coronary artery disease     ESBL (extended spectrum beta-lactamase) producing bacteria infection 6/30/2015    E. Coli urine    HH (hiatus hernia)     Hyperkalemia 4/29/2020    Hyperlipidemia     Hypertension     Hypotension 4/29/2020    Thyroid disease      The patient was seen and examined on day of discharge and this discharge summary is in conjunction with daily progress note from day of discharge. Code Status:  Prior    Hospital Course:   H&P Reviewed. patient admitted due to worsening abdominal pain patient failed outpatient treatment for UTI she was taking Macrobid which was causing her side effects also she had another urine culture done which did show ESBL and Enterococcus faecalis patient was treated with IV meropenem, patient was changed to IV ertapenem 1 g daily on discharge by Infectious Disease patient need to be due to early 04/14/2021. Patient was also sent home on ampicillin 500 mg 4 times a day for 1 week. patient will have a CBC and renal function done in 1 week.    patient did have elevated potassium of 5.5 patient was given Kayexalate on sounds are normal. There is no distension. Palpations: Abdomen is soft. Tenderness: There is no abdominal tenderness. There is no guarding. Musculoskeletal:         General: No tenderness or deformity. Lymphadenopathy:      Cervical: No cervical adenopathy. Upper Body:      Right upper body: No supraclavicular or epitrochlear adenopathy. Left upper body: No supraclavicular or epitrochlear adenopathy. Skin:     General: Skin is warm and dry. Capillary Refill: Capillary refill takes less than 2 seconds. Neurological:      Mental Status: She is alert. Mental status is at baseline. Motor: No abnormal muscle tone or seizure activity. Psychiatric:         Speech: Speech normal.         Consults:  IP CONSULT TO INFECTIOUS DISEASES  IP CONSULT TO SPIRITUAL SERVICES    Significant Diagnostic Studies: as above, and as follows:    Treatments: as above    Disposition: home    Discharged Condition: Stable    Follow Up:   Josafat Rubin in one week    Discharge Medications:    Marilee Gamez   Home Medication Instructions PHQ:[id number]    Printed on:04/02/21 7646   Medication Information                      acetaminophen (TYLENOL) 500 MG tablet  Take 1,000 mg by mouth every 6 hours as needed for Pain             ampicillin (PRINCIPEN) 500 MG capsule  Take 1 capsule by mouth 4 times daily for 7 days             aspirin 81 MG chewable tablet  Take 1 tablet by mouth daily             budesonide-formoterol (SYMBICORT) 80-4.5 MCG/ACT AERO  Inhale 2 puffs into the lungs 2 times daily             clopidogrel (PLAVIX) 75 MG tablet  Take 1 tablet by mouth daily             ertapenem (INVANZ) infusion  Infuse 1,000 mg intravenously every 24 hours for 12 days Compound per protocol.             famotidine (PEPCID) 20 MG tablet  Take 20 mg by mouth 2 times daily             ferrous sulfate (IRON 325) 325 (65 Fe) MG tablet  Take 325 mg by mouth Daily with supper

## 2021-04-02 NOTE — PROGRESS NOTES
Nurse went over discharge instructions. Port de accessed. Flushed with heparin. All questions answered.  No distress noted

## 2021-04-02 NOTE — PROGRESS NOTES
[facility] -   Infectious diseases evaluation  admission date 3/31/2021    reason for consultation:   UTI    Impression :   Current:  Recurrent UTI failed outpatient treatment ESBL producing E. coli and Enterococcus faecalis growth on urine culture 3/31/21  History of ESBL producing E. coli  Urinary retention  T2 urothelial cell carcinoma status post  tumor resection 8/24/2020 initially followed by  5 cycles of fu /mitomycin and radiation that was completed. Status postop transurethral resection of residual bladder tumor 12/30/2020  History of DVT on anticoagulation  Coronary artery disease  Hypertension  Hyperlipidemia       Recommendations   · IV Meropenem until 4/14/2021 may change to Ertapenem 1 g daily on discharge for the convenience of daily dosing. · PO Ampicillin 500 mg QID for 1 week  · Follow CBC renal function in 1 week  · The patient may be discharged from infectious disease point of view. · Discussed with discharge planner        History of Present Illness:   Initial history:  Jewel Ochoa is a 80y.o.-year-old female presented to hospital with dysuria, suprapubic pain and bladder spasms for 10 days, no aggravating or alleviating factors, no fever chills. Urine culture 3/16/2021 grew ESBL producing E. Coli. She was treated with Macrobid as outpatient with no improvement and was admitted for IV antibiotics  Urine culture 1/12/2021 grew ESBL producing E. Coli  She was treated with Macrobid and fosfomycin as outpatient with no improvement . The patient had T2 urothelial cell carcinoma status post  tumor resection 8/24/2020 initially followed by  5 cycles of fu /mitomycin and radiation that was completed.   Status postop transurethral resection of residual bladder tumor 12/30/2020   Urine culture 12/21/2020 grew  E. coli that was sensitive to ampicillin/ sulbactam and Klebsiella ([facility] was detected)  She was treated with Unasyn during her hospitalization, discharged on Augmentin. History of left lower extremity DVT, coronary artery disease, hypertension, hyperlipidemia, UTIs with history of ESBL, chronic renal disease, hydronephrosis. Prior bladder suspension surgery in 1970's  Interval changes  4/2/2021   She is feeling better today, denied any urinary symptoms, denied abdominal pain, no fever or chills, no diarrhea no new complaints. Patient Vitals for the past 8 hrs:   BP Temp Temp src Pulse Resp SpO2   04/02/21 0700 (!) 112/51 98.4 °F (36.9 °C) Oral 86 18 95 %           I have personally reviewed the past medical history, past surgical history, medications, social history, and family history, and I haveupdated the database accordingly. Allergies:   Bactrim [sulfamethoxazole-trimethoprim], Gentamycin [gentamicin], and Sulfamethoxazole-trimethoprim     Review of Systems:     Review of Systems  As per history of present illness, other than above 12 system review was negative  Physical Examination :       Physical Exam  Constitutional:       General: She is not in acute distress. HENT:      Head: Normocephalic and atraumatic. Right Ear: External ear normal.      Left Ear: External ear normal.      Mouth/Throat:      Pharynx: Oropharynx is clear. Eyes:      General: No scleral icterus. Conjunctiva/sclera: Conjunctivae normal.   Neck:      Musculoskeletal: Neck supple. No neck rigidity. Cardiovascular:      Rate and Rhythm: Normal rate and regular rhythm. Heart sounds: No murmur. Pulmonary:      Effort: Pulmonary effort is normal. No respiratory distress. Abdominal:      General: Abdomen is flat. There is no distension. Palpations: Abdomen is soft. Musculoskeletal:      Right lower leg: No edema. Left lower leg: No edema. Skin:     General: Skin is warm. Coloration: Skin is not jaundiced. Neurological:      General: No focal deficit present. Mental Status: She is alert and oriented to person, place, and time. Psychiatric:         Mood and Affect: Mood normal.         Behavior: Behavior normal.         Past Medical History:     Past Medical History:   Diagnosis Date    Cancer ([address]     bladder, finished tx 12/10/2020    Coronary artery disease     ESBL (extended spectrum beta-lactamase) producing bacteria infection 6/30/2015    E.  Coli urine    HH (hiatus hernia)     Hyperkalemia 4/29/2020    Hyperlipidemia     Hypertension     Hypotension 4/29/2020    Thyroid disease        Past Surgical  History:     Past Surgical History:   Procedure Laterality Date    ANGIOPLASTY      BLADDER SUSPENSION      CATARACT REMOVAL WITH IMPLANT Bilateral     TOTAL KNEE ARTHROPLASTY Right        Medications:      famotidine  10 mg Oral Daily    [Held by provider] lisinopril  2.5 mg Oral Daily    midodrine  2.5 mg Oral TID WC    aspirin  81 mg Oral Daily    budesonide-formoterol  2 puff Inhalation BID    clopidogrel  75 mg Oral Daily    ferrous sulfate  325 mg Oral BID WC    latanoprost  1 drop Both Eyes Nightly    levothyroxine  75 mcg Oral Daily    pravastatin  80 mg Oral Daily    cyanocobalamin  1,000 mcg Oral Daily    sodium chloride flush  10 mL Intravenous 2 times per day    meropenem  500 mg Intravenous Q12H    enoxaparin  30 mg Subcutaneous Daily       Social History:     Social History     Socioeconomic History    Marital status:      Spouse name: Not on file    Number of children: Not on file    Years of education: Not on file    Highest education level: Not on file   Occupational History    Not on file   Social Needs    Financial resource strain: Not on file    Food insecurity     Worry: Not on file     Inability: Not on file    Transportation needs     Medical: Not on file     Non-medical: Not on file   Tobacco Use    Smoking status: Never Smoker    Smokeless tobacco: Never Used   Substance and Sexual Activity    Alcohol use: No    Drug use: No    Sexual activity: Not on file   Lifestyle    Physical activity     Days per week: Not on file     Minutes per session: Not on file    Stress: Not on file   Relationships    Social connections     Talks on phone: Not on file     Gets together: Not on file     Attends Jain service: Not on file     Active member of club or organization: Not on file     Attends meetings of clubs or organizations: Not on file     Relationship status: Not on file    Intimate partner violence     Fear of current or ex partner: Not on file     Emotionally abused: Not on file     Physically abused: Not on file     Forced sexual activity: Not on file   Other Topics Concern    Not on file   Social History Narrative    Not on file       Family History:     Family History   Problem Relation Age of Onset    Diabetes Brother     Stroke Father     Coronary Art Dis Other         siblings X3      Medical Decision Making:   I have independently reviewed/ordered the following labs:    CBC with Differential:   Recent Labs     04/01/21  0546 04/02/21  0645   WBC 8.4 9.6   HGB 8.6* 8.7*   HCT 26.9* 26.6*    366   LYMPHOPCT 9* 8*   MONOPCT 11* 11*     BMP:  Recent Labs     04/01/21  0546 04/02/21  0645    135   K 4.8 5.5*    103   CO2 22 24   BUN 18 17   CREATININE 1.64* 1.53*     Hepatic Function Panel:   Recent Labs     04/01/21  0546 04/02/21  0645   PROT 6.3* 6.1*   LABALBU 2.4* 2.4*   BILITOT <0.15* <0.15*   ALKPHOS 81 83   ALT 5 <5*   AST 12 10     No results for input(s): RPR in the last 72 hours. No results for input(s): HIV in the last 72 hours. No results for input(s): BC in the last 72 hours. Lab Results   Component Value Date    CREATININE 1.53 04/02/2021    GLUCOSE 95 04/02/2021       Detailed results: Thank you for allowing us to participate in the care of this patient. Please call with questions.     This note is created with the assistance of a speech recognition program.  While intending to generate adocument that actually reflects the content of the visit, the document can still have some errors including those of syntax and sound a like substitutions which may escape proof reading. It such instances, actual meaningcan be extrapolated by contextual diversion.     Donavan Saenz MD  Office: [phone]  Perfect serve / office [phone]

## 2021-04-02 NOTE — CARE COORDINATION
ONGOING DISCHARGE PLAN:    Patient is alert and oriented x4. Spoke with patient regarding discharge plan and patient confirms that plan is still to return to home w/ . Pt. Denies VNS, Ashleigh Melendez was following if IV antibiotics would be needed for home. Pt. Wants to go to [address] [place] [address]. She Is requesting a 9:30 AM time. Neymar Barrera from [facility] notified that pt. Has no other needs at this time. Per Dr. Calin Soliz, pt. Will have IV Ertapenem, 1 gram daily, until 4/14/21. Writer faxed Face Sheet, ID progress notes, & Order for the Ertapenem, to [facility] at [phone], however, the Center is closed today. Writer did leave message. Writer did [employer], from [facility], that pt. Will need Infusions set up for the weekend. Writer faxed, Face Sheet, Order for the Ertapenem & ID Progress notes to [phone]. Writer spoke to Kelly, at [facility] inpatient pharmacy & informed her of above, Writer did fax, the above paperwork to her at [phone]. She will follow. Writer spoke to pt. & informed pt. To go to admitting on Saturday & to ER on Sunday, to check in. Pt. States, understanding. Will continue to follow for additional discharge needs.     Electronically signed by Anthony Cassidy RN on 4/2/2021 at 12:19 PM

## 2021-04-03 ENCOUNTER — HOSPITAL ENCOUNTER (OUTPATIENT)
Age: 86
Discharge: HOME | End: 2021-04-03
Attending: INTERNAL MEDICINE | Admitting: INTERNAL MEDICINE
Payer: MEDICARE

## 2021-04-03 PROCEDURE — 96374 THER/PROPH/DIAG INJ IV PUSH: CPT

## 2021-04-03 PROCEDURE — 2580000003 HC RX 258: Performed by: INTERNAL MEDICINE

## 2021-04-03 PROCEDURE — 6360000002 HC RX W HCPCS: Performed by: INTERNAL MEDICINE

## 2021-04-03 RX ORDER — HEPARIN SODIUM (PORCINE) LOCK FLUSH IV SOLN 100 UNIT/ML 100 UNIT/ML
300 SOLUTION INTRAVENOUS PRN
Status: DISCONTINUED | OUTPATIENT
Start: 2021-04-03 | End: 2021-04-03 | Stop reason: HOSPADM

## 2021-04-03 RX ADMIN — SODIUM CHLORIDE 1000 MG: 900 INJECTION INTRAVENOUS at 09:58

## 2021-04-03 RX ADMIN — HEPARIN 300 UNITS: 100 SYRINGE at 10:26

## 2021-04-03 NOTE — PROGRESS NOTES
Patient arrives to [employer] [employer] room 2055 for infusion at this time. Chest port accessed with 3/4 in needle, blood return received, flushed and infusion started. Alcohol caps on and in-place.

## 2021-04-03 NOTE — PROGRESS NOTES
Patients infusion complete at this time, flushed port with heparin. De accessed. Patient tolerated well. Wheeled to lobby.

## 2021-04-04 ENCOUNTER — HOSPITAL ENCOUNTER (OUTPATIENT)
Age: 86
Discharge: HOME | End: 2021-04-04
Attending: INTERNAL MEDICINE | Admitting: INTERNAL MEDICINE
Payer: MEDICARE

## 2021-04-04 VITALS
RESPIRATION RATE: 16 BRPM | OXYGEN SATURATION: 99 % | TEMPERATURE: 97.9 F | DIASTOLIC BLOOD PRESSURE: 61 MMHG | SYSTOLIC BLOOD PRESSURE: 122 MMHG | HEART RATE: 78 BPM

## 2021-04-04 PROCEDURE — 2580000003 HC RX 258: Performed by: INTERNAL MEDICINE

## 2021-04-04 PROCEDURE — 6360000002 HC RX W HCPCS: Performed by: INTERNAL MEDICINE

## 2021-04-04 PROCEDURE — 96365 THER/PROPH/DIAG IV INF INIT: CPT

## 2021-04-04 RX ORDER — HEPARIN SODIUM (PORCINE) LOCK FLUSH IV SOLN 100 UNIT/ML 100 UNIT/ML
300 SOLUTION INTRAVENOUS PRN
Status: DISCONTINUED | OUTPATIENT
Start: 2021-04-04 | End: 2021-04-04 | Stop reason: HOSPADM

## 2021-04-04 RX ADMIN — SODIUM CHLORIDE 1000 MG: 900 INJECTION INTRAVENOUS at 09:25

## 2021-04-04 RX ADMIN — HEPARIN 300 UNITS: 100 SYRINGE at 10:07

## 2021-04-04 NOTE — PROGRESS NOTES
Patient arrives for infusion at this time. Chest port accessed with 3/4 inch needle, blood returned, flushed and infusion started. Patient tolerates well.

## 2021-04-05 ENCOUNTER — HOSPITAL ENCOUNTER (OUTPATIENT)
Dept: INFUSION THERAPY | Age: 86
Setting detail: INFUSION SERIES
Discharge: HOME | End: 2021-04-05
Payer: MEDICARE

## 2021-04-05 VITALS
DIASTOLIC BLOOD PRESSURE: 61 MMHG | HEART RATE: 80 BPM | TEMPERATURE: 97.1 F | OXYGEN SATURATION: 100 % | RESPIRATION RATE: 18 BRPM | SYSTOLIC BLOOD PRESSURE: 109 MMHG

## 2021-04-05 DIAGNOSIS — N39.0 URINARY TRACT INFECTION, RECURRENT: Primary | ICD-10-CM

## 2021-04-05 PROCEDURE — 2580000003 HC RX 258: Performed by: INTERNAL MEDICINE

## 2021-04-05 PROCEDURE — 96365 THER/PROPH/DIAG IV INF INIT: CPT

## 2021-04-05 PROCEDURE — 6360000002 HC RX W HCPCS: Performed by: INTERNAL MEDICINE

## 2021-04-05 RX ORDER — SODIUM CHLORIDE 0.9 % (FLUSH) 0.9 %
10 SYRINGE (ML) INJECTION PRN
Status: CANCELLED | OUTPATIENT
Start: 2021-04-06

## 2021-04-05 RX ORDER — SODIUM CHLORIDE 9 MG/ML
INJECTION, SOLUTION INTRAVENOUS CONTINUOUS
Status: CANCELLED | OUTPATIENT
Start: 2021-04-06

## 2021-04-05 RX ORDER — HEPARIN SODIUM (PORCINE) LOCK FLUSH IV SOLN 100 UNIT/ML 100 UNIT/ML
500 SOLUTION INTRAVENOUS PRN
Status: CANCELLED | OUTPATIENT
Start: 2021-04-06

## 2021-04-05 RX ORDER — DIPHENHYDRAMINE HYDROCHLORIDE 50 MG/ML
50 INJECTION INTRAMUSCULAR; INTRAVENOUS ONCE
Status: CANCELLED | OUTPATIENT
Start: 2021-04-06 | End: 2021-04-06

## 2021-04-05 RX ORDER — SODIUM CHLORIDE 0.9 % (FLUSH) 0.9 %
5 SYRINGE (ML) INJECTION PRN
Status: CANCELLED | OUTPATIENT
Start: 2021-04-06

## 2021-04-05 RX ORDER — HEPARIN SODIUM (PORCINE) LOCK FLUSH IV SOLN 100 UNIT/ML 100 UNIT/ML
500 SOLUTION INTRAVENOUS PRN
Status: DISCONTINUED | OUTPATIENT
Start: 2021-04-05 | End: 2021-04-06 | Stop reason: HOSPADM

## 2021-04-05 RX ORDER — METHYLPREDNISOLONE SODIUM SUCCINATE 125 MG/2ML
125 INJECTION, POWDER, LYOPHILIZED, FOR SOLUTION INTRAMUSCULAR; INTRAVENOUS ONCE
Status: CANCELLED | OUTPATIENT
Start: 2021-04-06 | End: 2021-04-06

## 2021-04-05 RX ORDER — EPINEPHRINE 1 MG/ML
0.3 INJECTION, SOLUTION, CONCENTRATE INTRAVENOUS PRN
Status: CANCELLED | OUTPATIENT
Start: 2021-04-06

## 2021-04-05 RX ADMIN — HEPARIN 500 UNITS: 100 SYRINGE at 10:09

## 2021-04-05 RX ADMIN — SODIUM CHLORIDE 1000 MG: 900 INJECTION INTRAVENOUS at 09:35

## 2021-04-05 NOTE — PROGRESS NOTES
Pt seen this date for ertapenem infusion. VS obtained and port accessed without complication. Infusion began at 935 and ended at 1009. Pt tolerated infusion well and discharged home.

## 2021-04-06 ENCOUNTER — HOSPITAL ENCOUNTER (OUTPATIENT)
Dept: INFUSION THERAPY | Age: 86
Setting detail: INFUSION SERIES
Discharge: HOME | End: 2021-04-06
Payer: MEDICARE

## 2021-04-06 VITALS
DIASTOLIC BLOOD PRESSURE: 60 MMHG | OXYGEN SATURATION: 98 % | SYSTOLIC BLOOD PRESSURE: 128 MMHG | HEART RATE: 74 BPM | RESPIRATION RATE: 17 BRPM | TEMPERATURE: 97.9 F

## 2021-04-06 DIAGNOSIS — N39.0 URINARY TRACT INFECTION, RECURRENT: Primary | ICD-10-CM

## 2021-04-06 LAB
CULTURE: NORMAL
CULTURE: NORMAL
Lab: NORMAL
Lab: NORMAL
SPECIMEN DESCRIPTION: NORMAL
SPECIMEN DESCRIPTION: NORMAL

## 2021-04-06 PROCEDURE — 6360000002 HC RX W HCPCS: Performed by: INTERNAL MEDICINE

## 2021-04-06 PROCEDURE — 96365 THER/PROPH/DIAG IV INF INIT: CPT

## 2021-04-06 PROCEDURE — 2580000003 HC RX 258: Performed by: INTERNAL MEDICINE

## 2021-04-06 RX ORDER — HEPARIN SODIUM (PORCINE) LOCK FLUSH IV SOLN 100 UNIT/ML 100 UNIT/ML
500 SOLUTION INTRAVENOUS PRN
Status: DISCONTINUED | OUTPATIENT
Start: 2021-04-06 | End: 2021-04-07 | Stop reason: HOSPADM

## 2021-04-06 RX ORDER — EPINEPHRINE 1 MG/ML
0.3 INJECTION, SOLUTION, CONCENTRATE INTRAVENOUS PRN
Status: CANCELLED | OUTPATIENT
Start: 2021-04-07

## 2021-04-06 RX ORDER — SODIUM CHLORIDE 0.9 % (FLUSH) 0.9 %
10 SYRINGE (ML) INJECTION PRN
Status: CANCELLED | OUTPATIENT
Start: 2021-04-07

## 2021-04-06 RX ORDER — METHYLPREDNISOLONE SODIUM SUCCINATE 125 MG/2ML
125 INJECTION, POWDER, LYOPHILIZED, FOR SOLUTION INTRAMUSCULAR; INTRAVENOUS ONCE
Status: CANCELLED | OUTPATIENT
Start: 2021-04-07 | End: 2021-04-07

## 2021-04-06 RX ORDER — DIPHENHYDRAMINE HYDROCHLORIDE 50 MG/ML
50 INJECTION INTRAMUSCULAR; INTRAVENOUS ONCE
Status: CANCELLED | OUTPATIENT
Start: 2021-04-07 | End: 2021-04-07

## 2021-04-06 RX ORDER — HEPARIN SODIUM (PORCINE) LOCK FLUSH IV SOLN 100 UNIT/ML 100 UNIT/ML
500 SOLUTION INTRAVENOUS PRN
Status: CANCELLED | OUTPATIENT
Start: 2021-04-07

## 2021-04-06 RX ORDER — SODIUM CHLORIDE 9 MG/ML
INJECTION, SOLUTION INTRAVENOUS CONTINUOUS
Status: CANCELLED | OUTPATIENT
Start: 2021-04-07

## 2021-04-06 RX ORDER — SODIUM CHLORIDE 0.9 % (FLUSH) 0.9 %
5 SYRINGE (ML) INJECTION PRN
Status: CANCELLED | OUTPATIENT
Start: 2021-04-07

## 2021-04-06 RX ADMIN — HEPARIN 500 UNITS: 100 SYRINGE at 09:35

## 2021-04-06 RX ADMIN — SODIUM CHLORIDE 1000 MG: 900 INJECTION INTRAVENOUS at 08:58

## 2021-04-07 ENCOUNTER — HOSPITAL ENCOUNTER (OUTPATIENT)
Dept: INFUSION THERAPY | Age: 86
Setting detail: INFUSION SERIES
Discharge: HOME | End: 2021-04-07
Payer: MEDICARE

## 2021-04-07 VITALS
DIASTOLIC BLOOD PRESSURE: 52 MMHG | TEMPERATURE: 96.9 F | HEART RATE: 84 BPM | RESPIRATION RATE: 18 BRPM | SYSTOLIC BLOOD PRESSURE: 115 MMHG | OXYGEN SATURATION: 100 %

## 2021-04-07 DIAGNOSIS — N39.0 URINARY TRACT INFECTION, RECURRENT: Primary | ICD-10-CM

## 2021-04-07 PROCEDURE — 96365 THER/PROPH/DIAG IV INF INIT: CPT

## 2021-04-07 PROCEDURE — 6360000002 HC RX W HCPCS: Performed by: INTERNAL MEDICINE

## 2021-04-07 PROCEDURE — 2580000003 HC RX 258: Performed by: INTERNAL MEDICINE

## 2021-04-07 RX ORDER — DIPHENHYDRAMINE HYDROCHLORIDE 50 MG/ML
50 INJECTION INTRAMUSCULAR; INTRAVENOUS ONCE
Status: CANCELLED | OUTPATIENT
Start: 2021-04-08 | End: 2021-04-08

## 2021-04-07 RX ORDER — SODIUM CHLORIDE 0.9 % (FLUSH) 0.9 %
10 SYRINGE (ML) INJECTION PRN
Status: CANCELLED | OUTPATIENT
Start: 2021-04-08

## 2021-04-07 RX ORDER — HEPARIN SODIUM (PORCINE) LOCK FLUSH IV SOLN 100 UNIT/ML 100 UNIT/ML
500 SOLUTION INTRAVENOUS PRN
Status: CANCELLED | OUTPATIENT
Start: 2021-04-08

## 2021-04-07 RX ORDER — EPINEPHRINE 1 MG/ML
0.3 INJECTION, SOLUTION, CONCENTRATE INTRAVENOUS PRN
Status: CANCELLED | OUTPATIENT
Start: 2021-04-08

## 2021-04-07 RX ORDER — METHYLPREDNISOLONE SODIUM SUCCINATE 125 MG/2ML
125 INJECTION, POWDER, LYOPHILIZED, FOR SOLUTION INTRAMUSCULAR; INTRAVENOUS ONCE
Status: CANCELLED | OUTPATIENT
Start: 2021-04-08 | End: 2021-04-08

## 2021-04-07 RX ORDER — SODIUM CHLORIDE 9 MG/ML
INJECTION, SOLUTION INTRAVENOUS CONTINUOUS
Status: CANCELLED | OUTPATIENT
Start: 2021-04-08

## 2021-04-07 RX ORDER — SODIUM CHLORIDE 0.9 % (FLUSH) 0.9 %
5 SYRINGE (ML) INJECTION PRN
Status: CANCELLED | OUTPATIENT
Start: 2021-04-08

## 2021-04-07 RX ORDER — HEPARIN SODIUM (PORCINE) LOCK FLUSH IV SOLN 100 UNIT/ML 100 UNIT/ML
500 SOLUTION INTRAVENOUS PRN
Status: DISCONTINUED | OUTPATIENT
Start: 2021-04-07 | End: 2021-04-08 | Stop reason: HOSPADM

## 2021-04-07 RX ADMIN — SODIUM CHLORIDE 1000 MG: 900 INJECTION INTRAVENOUS at 09:03

## 2021-04-07 NOTE — PROGRESS NOTES
Pt arrived for Invanz infusion. Vitals obtained and R chest port accessed without complications. Infusion started at 09:03 and ended at 09:39. Pt tolerated well. No s/s adverse reaction noted. Vitals remained stable as charted. Port flushed and capped. Pt discharged home via wheelchair with .

## 2021-04-08 ENCOUNTER — HOSPITAL ENCOUNTER (OUTPATIENT)
Dept: INFUSION THERAPY | Age: 86
Setting detail: INFUSION SERIES
Discharge: HOME | End: 2021-04-08
Payer: MEDICARE

## 2021-04-08 VITALS
OXYGEN SATURATION: 100 % | HEART RATE: 86 BPM | RESPIRATION RATE: 17 BRPM | DIASTOLIC BLOOD PRESSURE: 49 MMHG | TEMPERATURE: 96.9 F | SYSTOLIC BLOOD PRESSURE: 112 MMHG

## 2021-04-08 DIAGNOSIS — N39.0 URINARY TRACT INFECTION, RECURRENT: Primary | ICD-10-CM

## 2021-04-08 PROCEDURE — 6360000002 HC RX W HCPCS: Performed by: INTERNAL MEDICINE

## 2021-04-08 PROCEDURE — 96365 THER/PROPH/DIAG IV INF INIT: CPT

## 2021-04-08 PROCEDURE — 2580000003 HC RX 258: Performed by: INTERNAL MEDICINE

## 2021-04-08 RX ORDER — SODIUM CHLORIDE 9 MG/ML
INJECTION, SOLUTION INTRAVENOUS CONTINUOUS
Status: CANCELLED | OUTPATIENT
Start: 2021-04-09

## 2021-04-08 RX ORDER — DIPHENHYDRAMINE HYDROCHLORIDE 50 MG/ML
50 INJECTION INTRAMUSCULAR; INTRAVENOUS ONCE
Status: CANCELLED | OUTPATIENT
Start: 2021-04-09 | End: 2021-04-09

## 2021-04-08 RX ORDER — SODIUM CHLORIDE 0.9 % (FLUSH) 0.9 %
10 SYRINGE (ML) INJECTION PRN
Status: CANCELLED | OUTPATIENT
Start: 2021-04-09

## 2021-04-08 RX ORDER — SODIUM CHLORIDE 0.9 % (FLUSH) 0.9 %
5 SYRINGE (ML) INJECTION PRN
Status: CANCELLED | OUTPATIENT
Start: 2021-04-09

## 2021-04-08 RX ORDER — EPINEPHRINE 1 MG/ML
0.3 INJECTION, SOLUTION, CONCENTRATE INTRAVENOUS PRN
Status: CANCELLED | OUTPATIENT
Start: 2021-04-09

## 2021-04-08 RX ORDER — HEPARIN SODIUM (PORCINE) LOCK FLUSH IV SOLN 100 UNIT/ML 100 UNIT/ML
500 SOLUTION INTRAVENOUS PRN
Status: CANCELLED | OUTPATIENT
Start: 2021-04-09

## 2021-04-08 RX ORDER — METHYLPREDNISOLONE SODIUM SUCCINATE 125 MG/2ML
125 INJECTION, POWDER, LYOPHILIZED, FOR SOLUTION INTRAMUSCULAR; INTRAVENOUS ONCE
Status: CANCELLED | OUTPATIENT
Start: 2021-04-09 | End: 2021-04-09

## 2021-04-08 RX ADMIN — SODIUM CHLORIDE 1000 MG: 900 INJECTION INTRAVENOUS at 08:45

## 2021-04-08 NOTE — PROGRESS NOTES
Pt seen this date for invanz infusion. VS obtained and port flushed without complication. Infusion began at 845 and ended at 56. Port clamped and capped. Pt tolerated infusion well and discharged home.

## 2021-04-09 ENCOUNTER — HOSPITAL ENCOUNTER (OUTPATIENT)
Dept: INFUSION THERAPY | Age: 86
Setting detail: INFUSION SERIES
Discharge: HOME | End: 2021-04-09
Payer: MEDICARE

## 2021-04-09 VITALS
OXYGEN SATURATION: 100 % | TEMPERATURE: 96.9 F | HEART RATE: 78 BPM | RESPIRATION RATE: 16 BRPM | SYSTOLIC BLOOD PRESSURE: 114 MMHG | DIASTOLIC BLOOD PRESSURE: 50 MMHG

## 2021-04-09 DIAGNOSIS — N39.0 URINARY TRACT INFECTION, RECURRENT: Primary | ICD-10-CM

## 2021-04-09 LAB
BUN BLDV-MCNC: 12 MG/DL (ref 8–23)
CREAT SERPL-MCNC: 1.61 MG/DL (ref 0.5–0.9)
GFR AFRICAN AMERICAN: 37 ML/MIN
GFR NON-AFRICAN AMERICAN: 30 ML/MIN
GFR SERPL CREATININE-BSD FRML MDRD: ABNORMAL ML/MIN/{1.73_M2}
GFR SERPL CREATININE-BSD FRML MDRD: ABNORMAL ML/MIN/{1.73_M2}
HCT VFR BLD CALC: 28.3 % (ref 36–46)
HEMOGLOBIN: 9 G/DL (ref 12–16)
MCH RBC QN AUTO: 29.5 PG (ref 26–34)
MCHC RBC AUTO-ENTMCNC: 31.7 G/DL (ref 31–37)
MCV RBC AUTO: 93.2 FL (ref 80–100)
NRBC AUTOMATED: ABNORMAL PER 100 WBC
PDW BLD-RTO: 15.1 % (ref 11.5–14.9)
PLATELET # BLD: 431 K/UL (ref 150–450)
PMV BLD AUTO: 5.8 FL (ref 6–12)
RBC # BLD: 3.04 M/UL (ref 4–5.2)
WBC # BLD: 8.4 K/UL (ref 3.5–11)

## 2021-04-09 PROCEDURE — 36591 DRAW BLOOD OFF VENOUS DEVICE: CPT

## 2021-04-09 PROCEDURE — 84520 ASSAY OF UREA NITROGEN: CPT

## 2021-04-09 PROCEDURE — 36415 COLL VENOUS BLD VENIPUNCTURE: CPT

## 2021-04-09 PROCEDURE — 2580000003 HC RX 258: Performed by: INTERNAL MEDICINE

## 2021-04-09 PROCEDURE — 6360000002 HC RX W HCPCS: Performed by: INTERNAL MEDICINE

## 2021-04-09 PROCEDURE — 82565 ASSAY OF CREATININE: CPT

## 2021-04-09 PROCEDURE — 96365 THER/PROPH/DIAG IV INF INIT: CPT

## 2021-04-09 PROCEDURE — 85027 COMPLETE CBC AUTOMATED: CPT

## 2021-04-09 RX ORDER — DIPHENHYDRAMINE HYDROCHLORIDE 50 MG/ML
50 INJECTION INTRAMUSCULAR; INTRAVENOUS ONCE
Status: CANCELLED | OUTPATIENT
Start: 2021-04-10 | End: 2021-04-10

## 2021-04-09 RX ORDER — SODIUM CHLORIDE 0.9 % (FLUSH) 0.9 %
10 SYRINGE (ML) INJECTION PRN
Status: CANCELLED | OUTPATIENT
Start: 2021-04-10

## 2021-04-09 RX ORDER — SODIUM CHLORIDE 0.9 % (FLUSH) 0.9 %
5 SYRINGE (ML) INJECTION PRN
Status: CANCELLED | OUTPATIENT
Start: 2021-04-10

## 2021-04-09 RX ORDER — METHYLPREDNISOLONE SODIUM SUCCINATE 125 MG/2ML
125 INJECTION, POWDER, LYOPHILIZED, FOR SOLUTION INTRAMUSCULAR; INTRAVENOUS ONCE
Status: CANCELLED | OUTPATIENT
Start: 2021-04-10 | End: 2021-04-10

## 2021-04-09 RX ORDER — SODIUM CHLORIDE 9 MG/ML
INJECTION, SOLUTION INTRAVENOUS CONTINUOUS
Status: CANCELLED | OUTPATIENT
Start: 2021-04-10

## 2021-04-09 RX ORDER — HEPARIN SODIUM (PORCINE) LOCK FLUSH IV SOLN 100 UNIT/ML 100 UNIT/ML
500 SOLUTION INTRAVENOUS PRN
Status: CANCELLED | OUTPATIENT
Start: 2021-04-10

## 2021-04-09 RX ORDER — EPINEPHRINE 1 MG/ML
0.3 INJECTION, SOLUTION, CONCENTRATE INTRAVENOUS PRN
Status: CANCELLED | OUTPATIENT
Start: 2021-04-10

## 2021-04-09 RX ADMIN — SODIUM CHLORIDE 1000 MG: 900 INJECTION INTRAVENOUS at 08:44

## 2021-04-09 NOTE — PROGRESS NOTES
Pt arrived for Ertapenem infusion. Vitals obtained and R chest port flushed without complications. Labs easily drawn from port. Infusion started at 08:44 and ended at 09:17. Pt tolerated well. No s/s adverse reaction noted. Vitals remained stable as charted. Port flushed and capped. Pt discharged home via wheelchair with .

## 2021-04-10 ENCOUNTER — HOSPITAL ENCOUNTER (OUTPATIENT)
Age: 86
Discharge: HOME | End: 2021-04-10
Attending: INTERNAL MEDICINE | Admitting: INTERNAL MEDICINE
Payer: MEDICARE

## 2021-04-10 VITALS
RESPIRATION RATE: 16 BRPM | DIASTOLIC BLOOD PRESSURE: 59 MMHG | OXYGEN SATURATION: 100 % | TEMPERATURE: 97.7 F | SYSTOLIC BLOOD PRESSURE: 121 MMHG | HEART RATE: 73 BPM

## 2021-04-10 PROCEDURE — 6360000002 HC RX W HCPCS: Performed by: INTERNAL MEDICINE

## 2021-04-10 PROCEDURE — 96365 THER/PROPH/DIAG IV INF INIT: CPT

## 2021-04-10 PROCEDURE — 2580000003 HC RX 258: Performed by: INTERNAL MEDICINE

## 2021-04-10 RX ADMIN — SODIUM CHLORIDE 1000 MG: 900 INJECTION INTRAVENOUS at 09:44

## 2021-04-10 NOTE — PROGRESS NOTES
[facility] flushed and medication started. 1015 medication completed and port flushed. See MAR. Pt discharged to the [facility].  No distress noted

## 2021-04-10 NOTE — PROGRESS NOTES
Pt arrived to the unit for ertapenem infusion. Vital signs as charted. Order placed per Dr Savana Watters written order. Pt denies pain. No distress noted.

## 2021-04-11 ENCOUNTER — HOSPITAL ENCOUNTER (OUTPATIENT)
Age: 86
Discharge: HOME | End: 2021-04-11
Attending: INTERNAL MEDICINE | Admitting: INTERNAL MEDICINE
Payer: MEDICARE

## 2021-04-11 PROCEDURE — 6360000002 HC RX W HCPCS: Performed by: INTERNAL MEDICINE

## 2021-04-11 PROCEDURE — 2580000003 HC RX 258: Performed by: INTERNAL MEDICINE

## 2021-04-11 PROCEDURE — 96365 THER/PROPH/DIAG IV INF INIT: CPT

## 2021-04-11 RX ADMIN — SODIUM CHLORIDE 1000 MG: 900 INJECTION INTRAVENOUS at 09:30

## 2021-04-11 NOTE — PROGRESS NOTES
Pt arrives to room 2069 via wheelchair transported by staff. She is alert and oriented, she is here for an Invanz infusion through her chest port which was left accessed from yesterdays infusion. Order is in, pharmacy has been called. Pt spouse at bedside.

## 2021-04-11 NOTE — PROGRESS NOTES
Infusion completed at this time. Port flushed with no complications. Pt and spouse state to leave chest port accessed for infusion center tomorrow. Site is locked and capped.

## 2021-04-12 ENCOUNTER — HOSPITAL ENCOUNTER (OUTPATIENT)
Dept: INFUSION THERAPY | Age: 86
Setting detail: INFUSION SERIES
Discharge: HOME | End: 2021-04-12
Payer: MEDICARE

## 2021-04-12 VITALS
DIASTOLIC BLOOD PRESSURE: 47 MMHG | HEART RATE: 81 BPM | TEMPERATURE: 96.7 F | OXYGEN SATURATION: 100 % | SYSTOLIC BLOOD PRESSURE: 111 MMHG | RESPIRATION RATE: 17 BRPM

## 2021-04-12 DIAGNOSIS — N39.0 URINARY TRACT INFECTION, RECURRENT: Primary | ICD-10-CM

## 2021-04-12 PROCEDURE — 96365 THER/PROPH/DIAG IV INF INIT: CPT

## 2021-04-12 PROCEDURE — 2580000003 HC RX 258: Performed by: INTERNAL MEDICINE

## 2021-04-12 PROCEDURE — 6360000002 HC RX W HCPCS: Performed by: INTERNAL MEDICINE

## 2021-04-12 RX ORDER — SODIUM CHLORIDE 0.9 % (FLUSH) 0.9 %
5 SYRINGE (ML) INJECTION PRN
Status: CANCELLED | OUTPATIENT
Start: 2021-04-13

## 2021-04-12 RX ORDER — SODIUM CHLORIDE 9 MG/ML
INJECTION, SOLUTION INTRAVENOUS CONTINUOUS
Status: CANCELLED | OUTPATIENT
Start: 2021-04-13

## 2021-04-12 RX ORDER — METHYLPREDNISOLONE SODIUM SUCCINATE 125 MG/2ML
125 INJECTION, POWDER, LYOPHILIZED, FOR SOLUTION INTRAMUSCULAR; INTRAVENOUS ONCE
Status: CANCELLED | OUTPATIENT
Start: 2021-04-13 | End: 2021-04-13

## 2021-04-12 RX ORDER — SODIUM CHLORIDE 0.9 % (FLUSH) 0.9 %
10 SYRINGE (ML) INJECTION PRN
Status: CANCELLED | OUTPATIENT
Start: 2021-04-13

## 2021-04-12 RX ORDER — DIPHENHYDRAMINE HYDROCHLORIDE 50 MG/ML
50 INJECTION INTRAMUSCULAR; INTRAVENOUS ONCE
Status: CANCELLED | OUTPATIENT
Start: 2021-04-13 | End: 2021-04-13

## 2021-04-12 RX ORDER — HEPARIN SODIUM (PORCINE) LOCK FLUSH IV SOLN 100 UNIT/ML 100 UNIT/ML
500 SOLUTION INTRAVENOUS PRN
Status: CANCELLED | OUTPATIENT
Start: 2021-04-13

## 2021-04-12 RX ORDER — EPINEPHRINE 1 MG/ML
0.3 INJECTION, SOLUTION, CONCENTRATE INTRAVENOUS PRN
Status: CANCELLED | OUTPATIENT
Start: 2021-04-13

## 2021-04-12 RX ADMIN — SODIUM CHLORIDE 1000 MG: 900 INJECTION INTRAVENOUS at 08:51

## 2021-04-12 NOTE — PROGRESS NOTES
Pt arrived for Invanz infusion. Vitals obtained and R chest port flushed without complications. Infusion started at 08:51 and ended at 09:25. Pt tolerated well. No s/s adverse reaction noted. Port flushed and capped. Pt discharged home via wheelchair with .

## 2021-04-13 ENCOUNTER — HOSPITAL ENCOUNTER (OUTPATIENT)
Dept: INFUSION THERAPY | Age: 86
Setting detail: INFUSION SERIES
Discharge: HOME | End: 2021-04-13
Payer: MEDICARE

## 2021-04-13 VITALS
DIASTOLIC BLOOD PRESSURE: 48 MMHG | TEMPERATURE: 96.6 F | OXYGEN SATURATION: 99 % | RESPIRATION RATE: 18 BRPM | SYSTOLIC BLOOD PRESSURE: 106 MMHG | HEART RATE: 80 BPM

## 2021-04-13 DIAGNOSIS — N39.0 URINARY TRACT INFECTION, RECURRENT: Primary | ICD-10-CM

## 2021-04-13 PROCEDURE — 96365 THER/PROPH/DIAG IV INF INIT: CPT

## 2021-04-13 PROCEDURE — 2580000003 HC RX 258: Performed by: INTERNAL MEDICINE

## 2021-04-13 PROCEDURE — 6360000002 HC RX W HCPCS: Performed by: INTERNAL MEDICINE

## 2021-04-13 RX ORDER — EPINEPHRINE 1 MG/ML
0.3 INJECTION, SOLUTION, CONCENTRATE INTRAVENOUS PRN
Status: CANCELLED | OUTPATIENT
Start: 2021-04-14

## 2021-04-13 RX ORDER — SODIUM CHLORIDE 0.9 % (FLUSH) 0.9 %
10 SYRINGE (ML) INJECTION PRN
Status: CANCELLED | OUTPATIENT
Start: 2021-04-14

## 2021-04-13 RX ORDER — METHYLPREDNISOLONE SODIUM SUCCINATE 125 MG/2ML
125 INJECTION, POWDER, LYOPHILIZED, FOR SOLUTION INTRAMUSCULAR; INTRAVENOUS ONCE
Status: CANCELLED | OUTPATIENT
Start: 2021-04-14 | End: 2021-04-14

## 2021-04-13 RX ORDER — DIPHENHYDRAMINE HYDROCHLORIDE 50 MG/ML
50 INJECTION INTRAMUSCULAR; INTRAVENOUS ONCE
Status: CANCELLED | OUTPATIENT
Start: 2021-04-14 | End: 2021-04-14

## 2021-04-13 RX ORDER — SODIUM CHLORIDE 9 MG/ML
INJECTION, SOLUTION INTRAVENOUS CONTINUOUS
Status: CANCELLED | OUTPATIENT
Start: 2021-04-14

## 2021-04-13 RX ORDER — SODIUM CHLORIDE 0.9 % (FLUSH) 0.9 %
5 SYRINGE (ML) INJECTION PRN
Status: CANCELLED | OUTPATIENT
Start: 2021-04-14

## 2021-04-13 RX ORDER — HEPARIN SODIUM (PORCINE) LOCK FLUSH IV SOLN 100 UNIT/ML 100 UNIT/ML
500 SOLUTION INTRAVENOUS PRN
Status: CANCELLED | OUTPATIENT
Start: 2021-04-14

## 2021-04-13 RX ADMIN — SODIUM CHLORIDE 1000 MG: 900 INJECTION INTRAVENOUS at 08:47

## 2021-04-14 ENCOUNTER — HOSPITAL ENCOUNTER (OUTPATIENT)
Dept: INFUSION THERAPY | Age: 86
Setting detail: INFUSION SERIES
Discharge: HOME | End: 2021-04-14
Payer: MEDICARE

## 2021-04-14 VITALS
HEART RATE: 95 BPM | SYSTOLIC BLOOD PRESSURE: 105 MMHG | DIASTOLIC BLOOD PRESSURE: 67 MMHG | TEMPERATURE: 96.6 F | RESPIRATION RATE: 17 BRPM

## 2021-04-14 DIAGNOSIS — N39.0 URINARY TRACT INFECTION, RECURRENT: Primary | ICD-10-CM

## 2021-04-14 PROCEDURE — 96365 THER/PROPH/DIAG IV INF INIT: CPT

## 2021-04-14 PROCEDURE — 2580000003 HC RX 258: Performed by: INTERNAL MEDICINE

## 2021-04-14 PROCEDURE — 6360000002 HC RX W HCPCS: Performed by: INTERNAL MEDICINE

## 2021-04-14 RX ORDER — SODIUM CHLORIDE 0.9 % (FLUSH) 0.9 %
10 SYRINGE (ML) INJECTION PRN
Status: CANCELLED | OUTPATIENT
Start: 2021-04-15

## 2021-04-14 RX ORDER — METHYLPREDNISOLONE SODIUM SUCCINATE 125 MG/2ML
125 INJECTION, POWDER, LYOPHILIZED, FOR SOLUTION INTRAMUSCULAR; INTRAVENOUS ONCE
Status: CANCELLED | OUTPATIENT
Start: 2021-04-15 | End: 2021-04-15

## 2021-04-14 RX ORDER — HEPARIN SODIUM (PORCINE) LOCK FLUSH IV SOLN 100 UNIT/ML 100 UNIT/ML
500 SOLUTION INTRAVENOUS PRN
Status: CANCELLED | OUTPATIENT
Start: 2021-04-15

## 2021-04-14 RX ORDER — DIPHENHYDRAMINE HYDROCHLORIDE 50 MG/ML
50 INJECTION INTRAMUSCULAR; INTRAVENOUS ONCE
Status: CANCELLED | OUTPATIENT
Start: 2021-04-15 | End: 2021-04-15

## 2021-04-14 RX ORDER — EPINEPHRINE 1 MG/ML
0.3 INJECTION, SOLUTION, CONCENTRATE INTRAVENOUS PRN
Status: CANCELLED | OUTPATIENT
Start: 2021-04-15

## 2021-04-14 RX ORDER — SODIUM CHLORIDE 9 MG/ML
INJECTION, SOLUTION INTRAVENOUS CONTINUOUS
Status: CANCELLED | OUTPATIENT
Start: 2021-04-15

## 2021-04-14 RX ORDER — HEPARIN SODIUM (PORCINE) LOCK FLUSH IV SOLN 100 UNIT/ML 100 UNIT/ML
500 SOLUTION INTRAVENOUS PRN
Status: DISCONTINUED | OUTPATIENT
Start: 2021-04-14 | End: 2021-04-15 | Stop reason: HOSPADM

## 2021-04-14 RX ORDER — SODIUM CHLORIDE 0.9 % (FLUSH) 0.9 %
5 SYRINGE (ML) INJECTION PRN
Status: CANCELLED | OUTPATIENT
Start: 2021-04-15

## 2021-04-14 RX ADMIN — SODIUM CHLORIDE 1000 MG: 900 INJECTION INTRAVENOUS at 08:40

## 2021-04-14 RX ADMIN — HEPARIN 500 UNITS: 100 SYRINGE at 09:14

## 2021-06-17 ENCOUNTER — HOSPITAL ENCOUNTER (EMERGENCY)
Age: 86
Discharge: HOME | End: 2021-06-17
Attending: EMERGENCY MEDICINE
Payer: MEDICARE

## 2021-06-17 ENCOUNTER — APPOINTMENT (OUTPATIENT)
Dept: GENERAL RADIOLOGY | Age: 86
End: 2021-06-17
Payer: MEDICARE

## 2021-06-17 VITALS
SYSTOLIC BLOOD PRESSURE: 141 MMHG | OXYGEN SATURATION: 93 % | HEART RATE: 98 BPM | TEMPERATURE: 98.2 F | DIASTOLIC BLOOD PRESSURE: 98 MMHG | HEIGHT: 58 IN | RESPIRATION RATE: 20 BRPM | WEIGHT: 127 LBS | BODY MASS INDEX: 26.66 KG/M2

## 2021-06-17 DIAGNOSIS — K59.00 CONSTIPATION, UNSPECIFIED CONSTIPATION TYPE: Primary | ICD-10-CM

## 2021-06-17 PROCEDURE — 74019 RADEX ABDOMEN 2 VIEWS: CPT

## 2021-06-17 PROCEDURE — 99285 EMERGENCY DEPT VISIT HI MDM: CPT

## 2021-06-17 RX ADMIN — Medication 240 ML: at 15:00

## 2021-06-17 RX ADMIN — Medication 240 ML: at 14:06

## 2021-06-17 RX ADMIN — Medication 240 ML: at 14:30

## 2021-06-17 ASSESSMENT — PAIN DESCRIPTION - FREQUENCY: FREQUENCY: CONTINUOUS

## 2021-06-17 ASSESSMENT — PAIN DESCRIPTION - LOCATION: LOCATION: VAGINA;RECTUM

## 2021-06-17 ASSESSMENT — PAIN SCALES - GENERAL: PAINLEVEL_OUTOF10: 8

## 2021-06-17 ASSESSMENT — PAIN DESCRIPTION - DESCRIPTORS: DESCRIPTORS: SHARP

## 2021-06-17 ASSESSMENT — PAIN DESCRIPTION - PAIN TYPE: TYPE: ACUTE PAIN

## 2021-06-17 NOTE — ED PROVIDER NOTES
BUDESONIDE-FORMOTEROL (SYMBICORT) 80-4.5 MCG/ACT AERO    Inhale 2 puffs into the lungs 2 times daily    CLOPIDOGREL (PLAVIX) 75 MG TABLET    Take 1 tablet by mouth daily    FAMOTIDINE (PEPCID) 20 MG TABLET    Take 20 mg by mouth 2 times daily    FERROUS SULFATE (IRON 325) 325 (65 FE) MG TABLET    Take 325 mg by mouth Daily with supper    HYDROCODONE-ACETAMINOPHEN (NORCO) 5-325 MG PER TABLET    Take 1 tablet by mouth 2 times daily as needed for Pain. LATANOPROST (XALATAN) 0.005 % OPHTHALMIC SOLUTION    Place 1 drop into both eyes nightly    LEVOTHYROXINE (SYNTHROID) 75 MCG TABLET    Take 75 mcg by mouth daily    PRAVASTATIN (PRAVACHOL) 80 MG TABLET    Take 80 mg by mouth daily    SENNA-DOCUSATE (PERICOLACE) 8.6-50 MG PER TABLET    Take 1 tablet by mouth daily as needed constipation    VITAMIN B-12 1000 MCG TABLET    Take 1 tablet by mouth daily       ALLERGIES     is allergic to bactrim [sulfamethoxazole-trimethoprim], gentamycin [gentamicin], and sulfamethoxazole-trimethoprim. FAMILY HISTORY     She indicated that the status of her father is unknown. She indicated that the status of her brother is unknown. She indicated that the status of her other is unknown.     family history includes Coronary Art Dis in an other family member; Diabetes in her brother; Stroke in her father. SOCIAL HISTORY      reports that she has never smoked. She has never used smokeless tobacco. She reports that she does not drink alcohol and does not use drugs.     PHYSICAL EXAM       ED Triage Vitals [06/17/21 1250]   BP Temp Temp Source Pulse Resp SpO2 Height Weight   130/76 98.2 °F (36.8 °C) Oral 98 20 92 % 4' 10\" (1.473 m) 127 lb (57.6 kg)       Constitutional: Alert, oriented x3, nontoxic, answering questions appropriately, acting properly for age, in no acute distress   HEENT: Extraocular muscles intact,   Neck: Trachea midline   Cardiovascular: Regular rhythm and rate no S3, S4, or murmurs   Respiratory: Clear to auscultation bilaterally no wheezes, rhonchi, rales, no respiratory distress no tachypnea no retractions no hypoxia  Gastrointestinal: Soft, nontender, nondistended, diminished bowel sounds. No rebound, rigidity, or guarding. No abdominal bruit no pulsatile mass  Musculoskeletal: No extremity pain or swelling   Neurologic: Moving all 4 extremities without difficulty there are no gross focal neurologic deficits   Skin: Warm and dry     DIFFERENTIAL DIAGNOSIS/ MDM:     Constipation. X-rays. Enema. DIAGNOSTIC RESULTS     EKG: All EKG's are interpreted by the Emergency Department Physician who either signs or Co-signs this chart in the absence of a cardiologist.        Not indicated unless otherwise documented above    LABS:  No results found for this visit on 06/17/21. Not indicated unless otherwise documented above    RADIOLOGY:   I reviewed the radiologist interpretations:    XR ABDOMEN (2 VIEWS)   Final Result   No evidence of bowel obstruction. Mild constipation             Not indicated unless otherwise documented above    EMERGENCY DEPARTMENT COURSE:     The patient was given the following medications:  Orders Placed This Encounter   Medications    milk and molasses enema 240 mL    milk and molasses enema 240 mL    milk and molasses enema 240 mL        Vitals:   -------------------------  BP (!) 141/98   Pulse 98   Temp 98.2 °F (36.8 °C) (Oral)   Resp 20   Ht 4' 10\" (1.473 m)   Wt 57.6 kg (127 lb)   SpO2 93%   BMI 26.54 kg/m²     3:50 PM after 3 enemas she was able to have a large results and feels much better. No abdominal pain. X-ray did show constipation no bowel obstruction. She knows that the pain medicine is slowing her bowels down. Increase fluids and fiber. Follow-up with family physician for reevaluation and return if worsening symptoms or other concerns.       The patient and her family member understands that at this time there is no evidence for a more malignant underlying process, but also understands that early in the process of an illness or injury, an emergency department workup can be falsely reassuring. Routine discharge counseling was given, and it is understood that worsening, changing or persistent symptoms should prompt an immediate call or follow up with their primary physician or return to the emergency department. The importance of appropriate follow up was also discussed. I have reviewed the disposition diagnosis. I have answered the questions and given discharge instructions. There was voiced understanding of these instructions and no further questions or complaints. CRITICAL CARE:    None    CONSULTS:    None    PROCEDURES:    None      [facility] Report if indicated             FINAL IMPRESSION      1.  Constipation, unspecified constipation type          DISPOSITION/PLAN   DISPOSITION Decision To Discharge 06/17/2021 03:50:34 PM        CONDITION ON DISPOSITION: STABLE       PATIENT REFERRED TO:  Con Patel  [facility]    Schedule an appointment as soon as possible for a visit in 3 days        DISCHARGE MEDICATIONS:  New Prescriptions    No medications on file       (Please note that portions of this note were completed with a voice recognition program.  Efforts were made to edit the dictations but occasionally words are mis-transcribed.)    Emily Moore DO   Attending Emergency Physician      Emily Moore DO  06/17/21 0148